# Patient Record
Sex: FEMALE | Race: WHITE | NOT HISPANIC OR LATINO | URBAN - METROPOLITAN AREA
[De-identification: names, ages, dates, MRNs, and addresses within clinical notes are randomized per-mention and may not be internally consistent; named-entity substitution may affect disease eponyms.]

---

## 2022-03-13 ENCOUNTER — INPATIENT (INPATIENT)
Facility: HOSPITAL | Age: 87
LOS: 1 days | Discharge: NURSING, REHAB OR LONG-TERM CARE | End: 2022-03-15
Attending: INTERNAL MEDICINE | Admitting: INTERNAL MEDICINE
Payer: MEDICARE

## 2022-03-13 VITALS
RESPIRATION RATE: 18 BRPM | HEART RATE: 88 BPM | TEMPERATURE: 97 F | OXYGEN SATURATION: 95 % | SYSTOLIC BLOOD PRESSURE: 100 MMHG | DIASTOLIC BLOOD PRESSURE: 50 MMHG

## 2022-03-13 DIAGNOSIS — Z98.890 OTHER SPECIFIED POSTPROCEDURAL STATES: Chronic | ICD-10-CM

## 2022-03-13 LAB
ALBUMIN SERPL ELPH-MCNC: 3.5 G/DL — SIGNIFICANT CHANGE UP (ref 3.5–5.2)
ALP SERPL-CCNC: 70 U/L — SIGNIFICANT CHANGE UP (ref 30–115)
ALT FLD-CCNC: 10 U/L — SIGNIFICANT CHANGE UP (ref 0–41)
ANION GAP SERPL CALC-SCNC: 16 MMOL/L — HIGH (ref 7–14)
APTT BLD: 19.6 SEC — CRITICAL LOW (ref 27–39.2)
AST SERPL-CCNC: 23 U/L — SIGNIFICANT CHANGE UP (ref 0–41)
BASOPHILS # BLD AUTO: 0.03 K/UL — SIGNIFICANT CHANGE UP (ref 0–0.2)
BASOPHILS NFR BLD AUTO: 0.4 % — SIGNIFICANT CHANGE UP (ref 0–1)
BILIRUB SERPL-MCNC: 0.6 MG/DL — SIGNIFICANT CHANGE UP (ref 0.2–1.2)
BUN SERPL-MCNC: 55 MG/DL — HIGH (ref 10–20)
CALCIUM SERPL-MCNC: 9.3 MG/DL — SIGNIFICANT CHANGE UP (ref 8.5–10.1)
CHLORIDE SERPL-SCNC: 92 MMOL/L — LOW (ref 98–110)
CO2 SERPL-SCNC: 31 MMOL/L — SIGNIFICANT CHANGE UP (ref 17–32)
CREAT SERPL-MCNC: 1.6 MG/DL — HIGH (ref 0.7–1.5)
EGFR: 30 ML/MIN/1.73M2 — LOW
EOSINOPHIL # BLD AUTO: 0.04 K/UL — SIGNIFICANT CHANGE UP (ref 0–0.7)
EOSINOPHIL NFR BLD AUTO: 0.5 % — SIGNIFICANT CHANGE UP (ref 0–8)
ETHANOL SERPL-MCNC: <10 MG/DL — SIGNIFICANT CHANGE UP
GLUCOSE SERPL-MCNC: 163 MG/DL — HIGH (ref 70–99)
HCT VFR BLD CALC: 36.8 % — LOW (ref 37–47)
HGB BLD-MCNC: 11.7 G/DL — LOW (ref 12–16)
IMM GRANULOCYTES NFR BLD AUTO: 0.6 % — HIGH (ref 0.1–0.3)
INR BLD: 1.04 RATIO — SIGNIFICANT CHANGE UP (ref 0.65–1.3)
LACTATE SERPL-SCNC: 2.5 MMOL/L — HIGH (ref 0.7–2)
LIDOCAIN IGE QN: 15 U/L — SIGNIFICANT CHANGE UP (ref 7–60)
LYMPHOCYTES # BLD AUTO: 0.92 K/UL — LOW (ref 1.2–3.4)
LYMPHOCYTES # BLD AUTO: 11.4 % — LOW (ref 20.5–51.1)
MCHC RBC-ENTMCNC: 29.3 PG — SIGNIFICANT CHANGE UP (ref 27–31)
MCHC RBC-ENTMCNC: 31.8 G/DL — LOW (ref 32–37)
MCV RBC AUTO: 92.2 FL — SIGNIFICANT CHANGE UP (ref 81–99)
MONOCYTES # BLD AUTO: 0.66 K/UL — HIGH (ref 0.1–0.6)
MONOCYTES NFR BLD AUTO: 8.2 % — SIGNIFICANT CHANGE UP (ref 1.7–9.3)
NEUTROPHILS # BLD AUTO: 6.35 K/UL — SIGNIFICANT CHANGE UP (ref 1.4–6.5)
NEUTROPHILS NFR BLD AUTO: 78.9 % — HIGH (ref 42.2–75.2)
NRBC # BLD: 0 /100 WBCS — SIGNIFICANT CHANGE UP (ref 0–0)
PLATELET # BLD AUTO: 482 K/UL — HIGH (ref 130–400)
POTASSIUM SERPL-MCNC: 3.7 MMOL/L — SIGNIFICANT CHANGE UP (ref 3.5–5)
POTASSIUM SERPL-SCNC: 3.7 MMOL/L — SIGNIFICANT CHANGE UP (ref 3.5–5)
PROT SERPL-MCNC: 6.8 G/DL — SIGNIFICANT CHANGE UP (ref 6–8)
PROTHROM AB SERPL-ACNC: 12 SEC — SIGNIFICANT CHANGE UP (ref 9.95–12.87)
RBC # BLD: 3.99 M/UL — LOW (ref 4.2–5.4)
RBC # FLD: 15.6 % — HIGH (ref 11.5–14.5)
SARS-COV-2 RNA SPEC QL NAA+PROBE: SIGNIFICANT CHANGE UP
SODIUM SERPL-SCNC: 139 MMOL/L — SIGNIFICANT CHANGE UP (ref 135–146)
TROPONIN T SERPL-MCNC: 0.06 NG/ML — CRITICAL HIGH
TROPONIN T SERPL-MCNC: 0.09 NG/ML — CRITICAL HIGH
WBC # BLD: 8.05 K/UL — SIGNIFICANT CHANGE UP (ref 4.8–10.8)
WBC # FLD AUTO: 8.05 K/UL — SIGNIFICANT CHANGE UP (ref 4.8–10.8)

## 2022-03-13 PROCEDURE — 93308 TTE F-UP OR LMTD: CPT | Mod: 26,GC

## 2022-03-13 PROCEDURE — 73070 X-RAY EXAM OF ELBOW: CPT | Mod: 26,RT

## 2022-03-13 PROCEDURE — 76604 US EXAM CHEST: CPT | Mod: 26,GC

## 2022-03-13 PROCEDURE — 73552 X-RAY EXAM OF FEMUR 2/>: CPT | Mod: 26,RT

## 2022-03-13 PROCEDURE — 99221 1ST HOSP IP/OBS SF/LOW 40: CPT

## 2022-03-13 PROCEDURE — 93010 ELECTROCARDIOGRAM REPORT: CPT

## 2022-03-13 PROCEDURE — 99223 1ST HOSP IP/OBS HIGH 75: CPT

## 2022-03-13 PROCEDURE — 72170 X-RAY EXAM OF PELVIS: CPT | Mod: 26

## 2022-03-13 PROCEDURE — 71260 CT THORAX DX C+: CPT | Mod: 26,MA

## 2022-03-13 PROCEDURE — 70450 CT HEAD/BRAIN W/O DYE: CPT | Mod: 26,MA

## 2022-03-13 PROCEDURE — 76705 ECHO EXAM OF ABDOMEN: CPT | Mod: 26,GC

## 2022-03-13 PROCEDURE — 71045 X-RAY EXAM CHEST 1 VIEW: CPT | Mod: 26

## 2022-03-13 PROCEDURE — 72125 CT NECK SPINE W/O DYE: CPT | Mod: 26,MA

## 2022-03-13 PROCEDURE — 99285 EMERGENCY DEPT VISIT HI MDM: CPT | Mod: 25,GC

## 2022-03-13 PROCEDURE — 74177 CT ABD & PELVIS W/CONTRAST: CPT | Mod: 26,MA

## 2022-03-13 RX ORDER — METOPROLOL TARTRATE 50 MG
25 TABLET ORAL DAILY
Refills: 0 | Status: DISCONTINUED | OUTPATIENT
Start: 2022-03-13 | End: 2022-03-15

## 2022-03-13 RX ORDER — VALSARTAN 80 MG/1
160 TABLET ORAL DAILY
Refills: 0 | Status: DISCONTINUED | OUTPATIENT
Start: 2022-03-13 | End: 2022-03-15

## 2022-03-13 RX ORDER — OXYCODONE HYDROCHLORIDE 5 MG/1
5 TABLET ORAL EVERY 6 HOURS
Refills: 0 | Status: DISCONTINUED | OUTPATIENT
Start: 2022-03-13 | End: 2022-03-15

## 2022-03-13 RX ORDER — ACETAMINOPHEN 500 MG
975 TABLET ORAL ONCE
Refills: 0 | Status: COMPLETED | OUTPATIENT
Start: 2022-03-13 | End: 2022-03-13

## 2022-03-13 RX ORDER — SODIUM CHLORIDE 9 MG/ML
500 INJECTION, SOLUTION INTRAVENOUS ONCE
Refills: 0 | Status: COMPLETED | OUTPATIENT
Start: 2022-03-13 | End: 2022-03-13

## 2022-03-13 RX ORDER — HYDROCHLOROTHIAZIDE 25 MG
25 TABLET ORAL DAILY
Refills: 0 | Status: DISCONTINUED | OUTPATIENT
Start: 2022-03-13 | End: 2022-03-15

## 2022-03-13 RX ORDER — ENOXAPARIN SODIUM 100 MG/ML
40 INJECTION SUBCUTANEOUS EVERY 24 HOURS
Refills: 0 | Status: DISCONTINUED | OUTPATIENT
Start: 2022-03-13 | End: 2022-03-15

## 2022-03-13 RX ORDER — SENNA PLUS 8.6 MG/1
2 TABLET ORAL AT BEDTIME
Refills: 0 | Status: DISCONTINUED | OUTPATIENT
Start: 2022-03-13 | End: 2022-03-15

## 2022-03-13 RX ADMIN — Medication 975 MILLIGRAM(S): at 14:37

## 2022-03-13 RX ADMIN — SODIUM CHLORIDE 500 MILLILITER(S): 9 INJECTION, SOLUTION INTRAVENOUS at 18:01

## 2022-03-13 RX ADMIN — Medication 975 MILLIGRAM(S): at 14:07

## 2022-03-13 RX ADMIN — SENNA PLUS 2 TABLET(S): 8.6 TABLET ORAL at 21:30

## 2022-03-13 RX ADMIN — ENOXAPARIN SODIUM 40 MILLIGRAM(S): 100 INJECTION SUBCUTANEOUS at 21:29

## 2022-03-13 NOTE — H&P ADULT - NSHPPHYSICALEXAM_GEN_ALL_CORE
VITALS:   T(C): 36.1 (03-13-22 @ 12:20), Max: 36.1 (03-13-22 @ 12:20)  HR: 93 (03-13-22 @ 12:45) (88 - 103)  BP: 135/61 (03-13-22 @ 12:45) (100/50 - 136/88)  RR: 19 (03-13-22 @ 12:30) (18 - 19)  SpO2: 96% (03-13-22 @ 12:30) (95% - 96%)    GENERAL: NAD, lying in bed comfortably  HEAD:  Atraumatic, normocephalic  EYES: EOMI, PERRLA, conjunctiva and sclera clear  ENT: Moist mucous membranes  NECK: Supple, no JVD  HEART: Regular rate and rhythm, no murmurs, rubs, or gallops  LUNGS: Unlabored respirations.  Clear to auscultation bilaterally, no crackles, wheezing, or rhonchi  ABDOMEN: Soft, nontender, nondistended, +BS  EXTREMITIES: 2+ peripheral pulses bilaterally. No clubbing, cyanosis, or edema. right hip tenderness  NERVOUS SYSTEM:  A&Ox3, no focal deficits   SKIN: No rashes or lesions

## 2022-03-13 NOTE — CONSULT NOTE ADULT - SUBJECTIVE AND OBJECTIVE BOX
Pt Name: LOREE CELAYA  MRN: [id number]      HPI: 89F 2-3 s/p R. hip hemiarthroplasty for R. hip fracture presents after unwitnessed fall at [facility]. States she has right hip pain, which is of the same quality and quantity of her pain prior to her fall. States she has ambulated since her fall. Denies numbness/tingling.     PAST MEDICAL & SURGICAL HISTORY:  HTN (hypertension)    Femur neck fracture  right    History of hip surgery  Right 2022        Allergies: No Known Allergies      Medications: enoxaparin Injectable 40 milliGRAM(s) SubCutaneous every 24 hours  hydrochlorothiazide 25 milliGRAM(s) Oral daily  metoprolol succinate ER 25 milliGRAM(s) Oral daily  oxyCODONE    IR 5 milliGRAM(s) Oral every 6 hours PRN  senna 2 Tablet(s) Oral at bedtime  valsartan 160 milliGRAM(s) Oral daily        PHYSICAL EXAM:    Vital Signs Last 24 Hrs  T(C): 36.1 (13 Mar 2022 12:20), Max: 36.1 (13 Mar 2022 12:20)  T(F): 97 (13 Mar 2022 12:20), Max: 97 (13 Mar 2022 12:20)  HR: 93 (13 Mar 2022 12:45) (88 - 103)  BP: 135/61 (13 Mar 2022 12:45) (100/50 - 136/88)  BP(mean): 88 (13 Mar 2022 12:45) (88 - 105)  RR: 19 (13 Mar 2022 12:30) (18 - 19)  SpO2: 96% (13 Mar 2022 12:30) (95% - 96%)    Physical Exam:  General: NAD, Alert, Awake and oriented  Neurologic: No gross deficits, moving all 4s.  Head: NCAT without abrasions, lacerations, or ecchymosis to head, face, or scalp  Respiratory: Unlabored breathing   Abdomen: Soft, Nontender, no guarding.  Musculoskeletal:        RLE:  Healed posterolateral incision   Mildly TTP at the Right hip   Able to SLR albeit with pain   Mild pain with log-roll or axial compression  Able to actively SLR.  SILT DP/SP/T/Mary/Sa.   EHL/FHL/TA/Gs motor intact.  2+ DP/PT pulses with brisk cap refill distally.  Compartments soft and compressible.   No pain on passive stretch.                 11.7   8.05  )-----------( 482      ( 13 Mar 2022 12:35 )             36.8     03-13    139  |  92<L>  |  55<H>  ----------------------------<  163<H>  3.7   |  31  |  1.6<H>    Ca    9.3      13 Mar 2022 12:35    TPro  6.8  /  Alb  3.5  /  TBili  0.6  /  DBili  x   /  AST  23  /  ALT  10  /  AlkPhos  70  03-13    PT/INR - ( 13 Mar 2022 12:35 )   PT: 12.00 sec;   INR: 1.04 ratio         PTT - ( 13 Mar 2022 12:35 )  PTT:19.6 sec    Imaging: No obvious fracture or dislocation. Cemented right hip hemiarthroplasty in adequate position.     A/P:    89y Female sp cemented r. hip hemiarthroplasty 2-3 weeks ago by Dr. Germain at [facility] presents for mechanical fall. Able to bear weight on her RLE with mild pain which she states is unchanged since her surgery. Her fluid collection seen on CT represents normative post-surgical changes.     - No orthopaedic surgery intervention  - WBAT RLE   - Patient can follow-up with her operating surgeon

## 2022-03-13 NOTE — ED PROVIDER NOTE - PROGRESS NOTE DETAILS
RK orthopaedic surgery consulted given recent hip surgery 2 weeks ago, ortho evaluated patient at bedside. RK per trauma recommendation, neurosurgery consulted, they will come assess pt. RK: patient cleared per trauma and neurosurgery services. patient to be admitted under medicine for elevated troponin and DIMAS. Patient came in as a trauma I intend to get an E-FAST

## 2022-03-13 NOTE — ED PROVIDER NOTE - OBJECTIVE STATEMENT
89F w/ pmhx of htn bibems from rehab facility [facility] for unwitnessed fall. pt is hard of hearing and poor historian, AAOX3. she does not remember falling. she was [place], [place] 1 [place]. unknown loc or ht. she does take PPX lovenox since she is in rehab facility. She broke her right hip after fall 2 weeks ago. denies pain anywhere except for right hip where her surgery was done.

## 2022-03-13 NOTE — ED PROVIDER NOTE - CARE PLAN
Principal Discharge DX:	Fall  Secondary Diagnosis:	Elevated troponin level  Secondary Diagnosis:	DIMAS (acute kidney injury)   1

## 2022-03-13 NOTE — H&P ADULT - ASSESSMENT
89yF w/ PMHx of HTN, HLD s/p Right hip replacement BIBEMS s/p unwitnessed fall at [facility], +ht, -loc, +lovenox (prophylactic, post surgical).  The patient states she fell at the nursing home on her right side, does not remember the events leading up to the fall or how she fell, but fell on the right sided that she was recently operated on by orthopedics. The patient is AOX3 at the time of my exam, GCS 15, and hemodynamically stable.     # Unwitnessed fall  # Recurrent falls  # Recent R hip fracture s/p surgery 2 weeks ago  - Trauma work-up negative  - Unkown LOC  - No sensorimotor deficits  - AOx3 at the time of my exam, but patient is a poor historian  - Cleared by trauma and neurosurgery  - Ortho consult  - Since patient is on lovenox, trend Hb  - C/w oxy 5mg q6 PRN for pain  - EEG  - TTE  - PT eval    # DIMAS vs CKD  - no baseline Cr  - Trend BMP    # Troponemia  - most likely type II  - Trend Trop  - 2nd and 3ed set trop ordered for 8 and 11:30pm     # HTN  - C/w HCTZ, valsartan, and toprol    Diet: DASH  Activity: IAT  DVT Prophylaxis: lovenox  GI Prophylaxis: not indicated  CHG Order  Code Status: full  Disposition: from [facility]

## 2022-03-13 NOTE — ED PROVIDER NOTE - ATTENDING CONTRIBUTION TO CARE
89-year-old female past medical history as documented presenting status post unwitnessed fall from nursing home.  Patient is very limited historian denies memory of the event.  Unsure if she hit her head or had LOC.  Currently endorsing isolated right hip pain described as achy, nonradiating, no palliative or provocative factors, mild severity.  Denies other injuries or complaints.    Vital Signs: I have reviewed the initial vital signs.  Constitutional: NAD, well-nourished, appears stated age, no acute distress.  HEENT: Airway patent, moist MM, no erythema/swelling/deformity of oral structures. EOMI, PERRLA.  CV: regular rate, regular rhythm, well-perfused extremities, 2+ b/l DP and radial pulses equal.  Lungs: BCTA, no increased WOB.  ABD: NTND, no guarding or rebound, no pulsatile mass, no hernias.   MSK: Neck supple, nontender, nl ROM, no stepoff. Chest nontender. Back nontender in TLS spine or to b/l bony structures or flanks. (+) R hip tenderness but otherwise ext nontender, nl rom, no deformity.   INTEG: Skin warm, dry, no rash.  NEURO: A&Ox3, normal strength, nl sensation throughout, normal speech.   PSYCH: Calm, cooperative, normal affect and interaction.    Trauma alert called from triage as patient on anticoagulant.  Will pan scan per trauma recs, obtain labs, reeval.

## 2022-03-13 NOTE — ED PROVIDER NOTE - NS ED ROS FT
Constitutional: No fever   Eyes:  No visual changes  Ears:  No hearing changes  Neck: No neck pain  Cardiac:  No chest pain  Respiratory:  No SOB   GI:  No abdominal pain, nausea, or vomiting  :  No dysuria  MS:  No back pain +right hip pain  Neuro:  No headache or weakness.  No LOC  Skin:  No skin rash

## 2022-03-13 NOTE — PATIENT PROFILE ADULT - FALL HARM RISK - HARM RISK INTERVENTIONS

## 2022-03-13 NOTE — ED PROVIDER NOTE - CLINICAL SUMMARY MEDICAL DECISION MAKING FREE TEXT BOX
Patient presented status post unwitnessed fall from nursing home.  Otherwise on arrival patient afebrile, hemodynamically stable, neurovascularly intact.  Trauma alert called this patient is on anticoagulation.  Pan scan for trauma Jose Enrique revealed age-indeterminate fractures of L5, as well as T4-T10 and right hip fracture.  Consulted neurosurgery who evaluated patient in ED and did not recommend any emergent intervention at this time.  Ortho also evaluated patient in ED and will follow.  Patient however with slight DIMAS as well as elevated troponin and blood work and with difficulty ambulating since the fall.  Per trauma, can admit to medicine for further management.  Patient is agreeable with plan.  Hemodynamically stable at time of admission.

## 2022-03-13 NOTE — ED PROCEDURE NOTE - ATTENDING CONTRIBUTION TO CARE
I personally supervised the study.  I reviewed the images and interpretation by the resident and have edited where appropriate.
I personally supervised the study.  I reviewed the images and interpretation by the resident and have edited where appropriate.

## 2022-03-13 NOTE — ED ADULT NURSE NOTE - OBJECTIVE STATEMENT
Pt. s/p unwitnessed fall at [facility], found on ground by staff. Pt. states she hit head, c/o pain to right hip (recently had surgery on hip for fx) and right elbow pain (skin tear noted). Pt. at baseline mental status per daughter. Pt. takes lovenox.

## 2022-03-13 NOTE — CONSULT NOTE ADULT - SUBJECTIVE AND OBJECTIVE BOX
HPI:   This is a 89F w/ pmhx of htn bibems from rehab facility [facility] for unwitnessed fall. pt is hard of hearing and poor historian, AAOX3. she does not remember falling. she was [place], [place] 1 [place]. unknown loc or ht. she does take PPX lovenox since she is in rehab facility. She broke her right hip after fall 2 weeks ago. denies pain anywhere except for right hip where her surgery was done.    Pt seen and examined at bedside in ER hallway.  Called for findings of < from: CT Abdomen and Pelvis w/ IV Cont (03.13.22 @ 13:51) >  IMPRESSION:    Age-indeterminate moderate compression deformities at L5, T10, T9, T8,   T6, T5, and T4. Age-indeterminate mild compression deformities at T11, T3   and T11.    Status post multilevel kyphoplasty at T12 through L4.    Partially imaged somewhat serpiginous fluid collection with a   well-defined thin wall is seen within the musculature adjacent to the   right hip which is status post hemiarthroplasty. Streak artifact limits   evaluation of this collection, which may be post surgical or perhaps   represent a distended greater trochanter bursa. Superimposed infection   cannot be ruled out. Clinical correlation with timeline of surgery and   for infection are recommended.    Punctate nodularity within an emphysematous bleb at the right apex   ((6-43). Consider short-term 3 month follow-up CT to evaluate for   stability.    Suggestion of bladder wall thickening which is out of portion to the   degree of underdistention. Correlation with urinalysis is recommended.    < end of copied text >    Pt c/o of pain to RLE adjacent to where she had her previous hemiarthroplasty. Pt states she fell but doesn't remember when.  She is a poor historian for details regarding fall.  Pt denies any neck or back pain.  Denies urinary/bowel incontinence.      PAST MEDICAL & SURGICAL HISTORY:  HTN (hypertension)    Femur neck fracture  right    History of hip surgery  Right 2022        Imaging: < from: CT Head No Cont (03.13.22 @ 13:22) >    IMPRESSION:  CT head: No evidence of acute transcortical infarct, acute intracranial   hemorrhage or mass effect.    CT cervical spine: No acute fracture or traumatic subluxation.    < end of copied text >      Home Medications:  Colace 100 mg oral capsule: 1 cap(s) orally 2 times a day (13 Mar 2022 12:56)  Lovenox 40 mg/0.4 mL injectable solution: 40 milligram(s) injectable once a day (13 Mar 2022 12:56)  Metoprolol Succinate ER 25 mg oral tablet, extended release: 1 tab(s) orally once a day (13 Mar 2022 12:56)  oxyCODONE 5 mg oral tablet: 1 tab(s) orally every 6 hours, As Needed - for moderate pain (13 Mar 2022 12:56)  valsartan-hydrochlorothiazide 160 mg-25 mg oral tablet: 1 tab(s) orally once a day (13 Mar 2022 12:56)      Allergies    No Known Allergies    Intolerances        ROS:  [ x ] A ten-point review of systems is negative except as noted   [  ] Due to altered mental status/intubation, subjective information were not able to be obtained from the patient. History was obtained, to the extent possible, from review of the chart and collateral sources of information    MEDICATIONS  (STANDING):    MEDICATIONS  (PRN):      ICU Vital Signs Last 24 Hrs  T(C): 36.1 (13 Mar 2022 12:20), Max: 36.1 (13 Mar 2022 12:20)  T(F): 97 (13 Mar 2022 12:20), Max: 97 (13 Mar 2022 12:20)  HR: 93 (13 Mar 2022 12:45) (88 - 103)  BP: 135/61 (13 Mar 2022 12:45) (100/50 - 136/88)  BP(mean): 88 (13 Mar 2022 12:45) (88 - 105)  ABP: --  ABP(mean): --  RR: 19 (13 Mar 2022 12:30) (18 - 19)  SpO2: 96% (13 Mar 2022 12:30) (95% - 96%)      I&O's Detail      CBC Full  -  ( 13 Mar 2022 12:35 )  WBC Count : 8.05 K/uL  RBC Count : 3.99 M/uL  Hemoglobin : 11.7 g/dL  Hematocrit : 36.8 %  Platelet Count - Automated : 482 K/uL  Mean Cell Volume : 92.2 fL  Mean Cell Hemoglobin : 29.3 pg  Mean Cell Hemoglobin Concentration : 31.8 g/dL  Auto Neutrophil # : 6.35 K/uL  Auto Lymphocyte # : 0.92 K/uL  Auto Monocyte # : 0.66 K/uL  Auto Eosinophil # : 0.04 K/uL  Auto Basophil # : 0.03 K/uL  Auto Neutrophil % : 78.9 %  Auto Lymphocyte % : 11.4 %  Auto Monocyte % : 8.2 %  Auto Eosinophil % : 0.5 %  Auto Basophil % : 0.4 %    03-13    139  |  92<L>  |  55<H>  ----------------------------<  163<H>  3.7   |  31  |  1.6<H>    Ca    9.3      13 Mar 2022 12:35    TPro  6.8  /  Alb  3.5  /  TBili  0.6  /  DBili  x   /  AST  23  /  ALT  10  /  AlkPhos  70  03-13    CARDIAC MARKERS ( 13 Mar 2022 12:35 )  x     / 0.09 ng/mL / x     / x     / x              Awake, alert.  Pt is hard of hearing  Verbal function intact  facial motions symmetric   PERRL  Motor: MAEx4  5/5 power in b/l UE  No Hoffmans   5/5 power in LLE  5-/5 power in RLE 2/2 pain  Sensation: intact to LT  No TTP             Assessment/Plan:  No neurosurgical intervention  Patient without pain  No neurologic deficits  Physical therapy once cleared by Trauma  discussed with attending

## 2022-03-13 NOTE — H&P ADULT - ATTENDING COMMENTS
IN PROGRESs    89 yr old female from [facility] with hx of HTN, HLD, recent Right hip replacement (2 weeks ago) admitted for unwitnessed fall.    # Unwitnessed fall  # Recent R hip fracture s/p surgery 2 weeks ago  - cleared by trauma and orthopedics   - pain control   - check orthostatics  - check UA  - PT eval     # DIMAS vs CKD 3  - Cr 1.6 (unknown baseline), BUN 55  - start NS@65  - Trend BMP  - hold HCTZ, valsartan for now     # Elevated Trop   - most likely type II  - Trop 0.09 --> 0.06  - EKG: NSR with no acute ST changes     # HTN  - c/w toprol  - hold HCTZ, valsartan for now   - if bp elevated start amlodipine     # DVT ppx  - c/w heparin     # Full code IN PROGRESS    89 yr old female from [facility] with hx of HTN, HLD, recent Right hip replacement (2 weeks ago) admitted for unwitnessed fall.    # Unwitnessed fall  # Recent R hip fracture s/p surgery 2 weeks ago  - cleared by trauma and orthopedics   - pain control   - check orthostatics  - check UA  - PT eval   - tele monitoring for 24 hrs    # DIMAS vs CKD 3  - Cr 1.6 (unknown baseline), BUN 55  - start NS@65  - Trend BMP  - hold HCTZ, valsartan for now     # Elevated Trop   - most likely type II  - Trop 0.09 --> 0.06  - EKG: NSR with no acute ST changes     # HTN  - c/w toprol  - hold HCTZ, valsartan for now   - if bp elevated start amlodipine     # DVT ppx  - c/w heparin     # Full code      ** Pt seen on 03/13/22 Patient is a poor historian.     89 yr old female from [facility] with hx of Dementia, HTN, HLD, recent Right hip replacement (2 weeks ago) admitted for unwitnessed fall.     # Unwitnessed fall  # Recent R hip fracture s/p surgery 2 weeks ago  - cleared by trauma and orthopedics   - right ankle swollen and tender  - check XR right ankle   - pain control   - check orthostatics  - check UA  - PT eval   - tele monitoring for 24 hrs    # DIMAS vs CKD 3  - Cr 1.6 (unknown baseline), BUN 55  - start NS@65  - Trend BMP  - hold HCTZ, valsartan for now     # Elevated Trop   - most likely type II  - Trop 0.09 --> 0.06  - EKG: NSR with no acute ST changes     # HTN  - c/w toprol  - hold HCTZ, valsartan for now   - if bp elevated start amlodipine     # DVT ppx  - c/w heparin     # Full code      ** Pt seen on 03/13/22

## 2022-03-13 NOTE — CONSULT NOTE ADULT - ATTENDING COMMENTS
agree with above  no acute ortho intervention  can fu with surgeon who did the procedure at [facility] for further care

## 2022-03-13 NOTE — ED PROVIDER NOTE - PHYSICAL EXAMINATION
CONSTITUTIONAL: well-developed, well-nourished, in no acute distress  SKIN: warm, dry  HEAD: Normocephalic atraumatic  EYES: no conjunctival erythema  ENT: no nasal discharge, airway clear  NECK: full ROM, non-tender  CARD: regular rate and rhythm  RESP: normal respiratory effort, no wheezes, rales or rhonchi  ABD: soft, non-distended, non-tender  BACK: no midline tenderness  EXT: moving all extremities spontaneously, right   NEURO: alert and oriented, grossly unremarkable  PSYCH: cooperative, appropriate Prednisone Counseling:  I discussed with the patient the risks of prolonged use of prednisone including but not limited to weight gain, insomnia, osteoporosis, mood changes, diabetes, susceptibility to infection, glaucoma and high blood pressure.  In cases where prednisone use is prolonged, patients should be monitored with blood pressure checks, serum glucose levels and an eye exam.  Additionally, the patient may need to be placed on GI prophylaxis, PCP prophylaxis, and calcium and vitamin D supplementation and/or a bisphosphonate.  The patient verbalized understanding of the proper use and the possible adverse effects of prednisone.  All of the patient's questions and concerns were addressed.

## 2022-03-13 NOTE — CONSULT NOTE ADULT - ATTENDING COMMENTS
patient seen, agree with abvoe. full trauma workup was performed including ct scan of head, c spine, chest abdomen and pelvis.

## 2022-03-13 NOTE — H&P ADULT - NSHPLABSRESULTS_GEN_ALL_CORE
LABS:  cret                        11.7   8.05  )-----------( 482      ( 13 Mar 2022 12:35 )             36.8     03-13    139  |  92<L>  |  55<H>  ----------------------------<  163<H>  3.7   |  31  |  1.6<H>    Ca    9.3      13 Mar 2022 12:35    TPro  6.8  /  Alb  3.5  /  TBili  0.6  /  DBili  x   /  AST  23  /  ALT  10  /  AlkPhos  70  03-13    PT/INR - ( 13 Mar 2022 12:35 )   PT: 12.00 sec;   INR: 1.04 ratio         PTT - ( 13 Mar 2022 12:35 )  PTT:19.6 sec

## 2022-03-13 NOTE — H&P ADULT - NSHPREVIEWOFSYSTEMS_GEN_ALL_CORE
REVIEW OF SYSTEMS:  presenting for fall  CONSTITUTIONAL: No weakness, fevers or chills  EYES/ENT: No visual changes;  No vertigo or throat pain   NECK: No pain or stiffness  RESPIRATORY: No cough, wheezing, hemoptysis; No shortness of breath  CARDIOVASCULAR: No chest pain or palpitations  GASTROINTESTINAL: No abdominal or epigastric pain. No nausea, vomiting, or hematemesis; No diarrhea or constipation. No melena or hematochezia.  GENITOURINARY: No dysuria, frequency or hematuria  NEUROLOGICAL: No numbness or weakness  SKIN: No itching, rashes

## 2022-03-13 NOTE — CONSULT NOTE ADULT - ASSESSMENT
ASSESSMENT:  89yF w/ PMHx of HTN, HLD s/p Right ?hip replacement seen as Trauma Alert  s/p unwitnessed fall at [facility], +ht, -loc, +lovenox (prophylactic, post surgical). External signs of trauma include right elbow skin tear. Patient complains of right hip pain after recent right hip surgery (?exact procedure unknown), right hip incision clean dry and intact, no right hip deformities.   ABCs intact, A+Ox2, GCS 15    Injuries identified: PENDING  -   -   -     PLAN:   - Trauma Labs: (CBC, BMP, Coags, T&S, UA, EtOH level)  Additional studies:  EKG  Utox    Trauma Imaging to include the following:  - CXR, Pelvic Xray  - CT Head,  CT C-spine, CT Chest, CT Abd/Pelvis  - Extremity films: Right elbow xrays, right femur xrays      Disposition pending results of above labs and imaging  Above plan discussed with Trauma attending, Dr. Dunn, Dr. Colorado, PGY-2  , patient, patient family, and ED team  --------------------------------------------------------------------------------------  03-13-22 @ 13:00 ASSESSMENT:  89yF w/ PMHx of HTN, HLD s/p Right ?hip replacement seen as Trauma Alert  s/p unwitnessed fall at [facility], +ht, -loc, +lovenox (prophylactic, post surgical). External signs of trauma include right elbow skin tear. Patient complains of right hip pain after recent right hip surgery (?exact procedure unknown), right hip incision clean dry and intact, no right hip deformities.   ABCs intact, A+Ox2, GCS 15    Injuries identified:  - No acute traumatic injuries  - Age-indeterminate compression defomities at L5,T10, T9, T8,T6,T5,T4- , mild compression deformities at T11, and T3, patient is non-tender on examination      PLAN:   -NSX consult  -Awaiting neurosurgery consult, will clear from trauma perspective after neurosurgery consult      Disposition pending results of above labs and imaging  Above plan discussed with Trauma attending, Dr. Dunn, Dr. Colorado, PGY-2  , patient, patient family, and ED team  --------------------------------------------------------------------------------------  03-13-22 @ 13:00 ASSESSMENT:  89yF w/ PMHx of HTN, HLD s/p Right ?hip replacement seen as Trauma Alert  s/p unwitnessed fall at [facility], +ht, -loc, +lovenox (prophylactic, post surgical). External signs of trauma include right elbow skin tear. Patient complains of right hip pain after recent right hip surgery (?exact procedure unknown), right hip incision clean dry and intact, no right hip deformities.   ABCs intact, A+Ox2, GCS 15    Injuries identified:  - No acute traumatic injuries  - Age-indeterminate compression defomities at L5,T10, T9, T8,T6,T5,T4- , mild compression deformities at T11, and T3, patient is non-tender on examination      PLAN:   -NSX consult: no acute intervention  -recommend orthopedic surgery consult  - no acute traumatic injuries. trauma tertiary survey 3/14      Disposition pending results of above labs and imaging  Above plan discussed with Trauma attending, Dr. Dunn, Dr. Colorado, PGY-2  , patient, patient family, and ED team  --------------------------------------------------------------------------------------  03-13-22 @ 13:00

## 2022-03-13 NOTE — CONSULT NOTE ADULT - SUBJECTIVE AND OBJECTIVE BOX
TRAUMA ACTIVATION LEVEL:  CODE / ALERT  / CONSULT  ACTIVATED BY: EMS**  /  ED**  INTUBATED: YES** / NO**      MECHANISM OF INJURY:   [x] Blunt     [x] Fall	      GCS: 15 	E: 4	V: 5	M: 6    HPI:    89yF w/ PMHx of HTN, HLD s/p Right ?hip replacement seen as Trauma Alert  s/p unwitnessed fall at [facility], +ht, -loc, +lovenox (prophylactic, post surgical).  The patient states she fell at the nursing home on her right side, does not remember the events leading up to the fall or how she fell, but fell on the right sided that she was recently operated on by orthopedics. The patient is A+OX2, GCS 15 in trauma bay, vital signs are stable. The patient complains of right hip pain and right elbow pain. External signs of trauma include right elbow skin tear and tenderness over the right hip incision site - no noted deformities. The patient is accompanied by her daughter who was not present during the fall.         Trauma assessment in ED: ABCs intact , GCS 15 , AAOx3.    PAST MEDICAL & SURGICAL HISTORY:  HTN (hypertension)    Femur neck fracture  right    History of hip surgery  Right 2022        Allergies    No Known Allergies    Intolerances        Home Medications:  Colace 100 mg oral capsule: 1 cap(s) orally 2 times a day (13 Mar 2022 12:56)  Lovenox 40 mg/0.4 mL injectable solution: 40 milligram(s) injectable once a day (13 Mar 2022 12:56)  Metoprolol Succinate ER 25 mg oral tablet, extended release: 1 tab(s) orally once a day (13 Mar 2022 12:56)  oxyCODONE 5 mg oral tablet: 1 tab(s) orally every 6 hours, As Needed - for moderate pain (13 Mar 2022 12:56)  valsartan-hydrochlorothiazide 160 mg-25 mg oral tablet: 1 tab(s) orally once a day (13 Mar 2022 12:56)      ROS: 10-system review is otherwise negative except HPI above.      Primary Survey:    A - airway intact  B - bilateral breath sounds and good chest rise  C - palpable pulses in all extremities  D - GCS 15 on arrival, SANDERS  Exposure obtained    Vital Signs Last 24 Hrs  T(C): 36.1 (13 Mar 2022 12:20), Max: 36.1 (13 Mar 2022 12:20)  T(F): 97 (13 Mar 2022 12:20), Max: 97 (13 Mar 2022 12:20)  HR: 93 (13 Mar 2022 12:45) (88 - 103)  BP: 135/61 (13 Mar 2022 12:45) (100/50 - 136/88)  BP(mean): 88 (13 Mar 2022 12:45) (88 - 105)  RR: 19 (13 Mar 2022 12:30) (18 - 19)  SpO2: 96% (13 Mar 2022 12:30) (95% - 96%)    Secondary Survey:   General: NAD  HEENT: Normocephalic, atraumatic, EOMI, PEERLA. no scalp lacerations   Neck: Soft, midline trachea. no c-spine tenderness  Chest: No chest wall tenderness, no subcutaneous emphysema   Cardiac: S1, S2, RRR  Respiratory: Bilateral breath sounds, clear and equal bilaterally  Abdomen: Soft, non-distended, non-tender, no rebound, no guarding.  Groin: Normal appearing, pelvis stable   Ext: Right lateral hip incision, incision is clean and dry, no erythema or signs of cellulitis. Unable to elevate right leg. . Bilateral LE 2+pitting edema. Right elbow skin tear Moving b/l upper and left lower extremities  Palpable Radial b/l UE, b/l DP palpable in LE.   Back: No T/L/S spine tenderness, No palpable runoff/stepoff/deformity  Rectal: No jyotsna blood, JENNIFER with good tone    FAST: Negative    ACCESS / DEVICES:  [X ] Peripheral IV    Labs:  PENDING      POCT Blood Glucose.: 156 mg/dL (13 Mar 2022 12:28)                        RADIOLOGY & ADDITIONAL STUDIES:  PENDING  ---------------------------------------------------------------------------------------     TRAUMA ACTIVATION LEVEL:  CODE / ALERT  / CONSULT  ACTIVATED BY: EMS**  /  ED**  INTUBATED: YES** / NO**      MECHANISM OF INJURY:   [x] Blunt     [x] Fall	      GCS: 15 	E: 4	V: 5	M: 6    HPI:    89yF w/ PMHx of HTN, HLD s/p Right ?hip replacement seen as Trauma Alert  s/p unwitnessed fall at [facility], +ht, -loc, +lovenox (prophylactic, post surgical).  The patient states she fell at the nursing home on her right side, does not remember the events leading up to the fall or how she fell, but fell on the right sided that she was recently operated on by orthopedics. The patient is A+OX2, GCS 15 in trauma bay, vital signs are stable. The patient complains of right hip pain and right elbow pain. External signs of trauma include right elbow skin tear and tenderness over the right hip incision site - no noted deformities. The patient is accompanied by her daughter who was not present during the fall.         Trauma assessment in ED: ABCs intact , GCS 15 , AAOx3.    PAST MEDICAL & SURGICAL HISTORY:  HTN (hypertension)    Femur neck fracture  right    History of hip surgery  Right 2022        Allergies    No Known Allergies    Intolerances        Home Medications:  Colace 100 mg oral capsule: 1 cap(s) orally 2 times a day (13 Mar 2022 12:56)  Lovenox 40 mg/0.4 mL injectable solution: 40 milligram(s) injectable once a day (13 Mar 2022 12:56)  Metoprolol Succinate ER 25 mg oral tablet, extended release: 1 tab(s) orally once a day (13 Mar 2022 12:56)  oxyCODONE 5 mg oral tablet: 1 tab(s) orally every 6 hours, As Needed - for moderate pain (13 Mar 2022 12:56)  valsartan-hydrochlorothiazide 160 mg-25 mg oral tablet: 1 tab(s) orally once a day (13 Mar 2022 12:56)      ROS: 10-system review is otherwise negative except HPI above.      Primary Survey:    A - airway intact  B - bilateral breath sounds and good chest rise  C - palpable pulses in all extremities  D - GCS 15 on arrival, SANDERS  Exposure obtained    Vital Signs Last 24 Hrs  T(C): 36.1 (13 Mar 2022 12:20), Max: 36.1 (13 Mar 2022 12:20)  T(F): 97 (13 Mar 2022 12:20), Max: 97 (13 Mar 2022 12:20)  HR: 93 (13 Mar 2022 12:45) (88 - 103)  BP: 135/61 (13 Mar 2022 12:45) (100/50 - 136/88)  BP(mean): 88 (13 Mar 2022 12:45) (88 - 105)  RR: 19 (13 Mar 2022 12:30) (18 - 19)  SpO2: 96% (13 Mar 2022 12:30) (95% - 96%)    Secondary Survey:   General: NAD  HEENT: Normocephalic, atraumatic, EOMI, PEERLA. no scalp lacerations   Neck: Soft, midline trachea. no c-spine tenderness  Chest: No chest wall tenderness, no subcutaneous emphysema   Cardiac: S1, S2, RRR  Respiratory: Bilateral breath sounds, clear and equal bilaterally  Abdomen: Soft, non-distended, non-tender, no rebound, no guarding.  Groin: Normal appearing, pelvis stable   Ext: Right lateral hip incision, incision is clean and dry, no erythema or signs of cellulitis. Unable to elevate right leg. . Bilateral LE 2+pitting edema. Right elbow skin tear Moving b/l upper and left lower extremities  Palpable Radial b/l UE, b/l DP palpable in LE.   Back: No T/L/S spine tenderness, No palpable runoff/stepoff/deformity  Rectal: No jyotsna blood, JENNIFER with good tone    FAST: Negative    ACCESS / DEVICES:  [X ] Peripheral IV    Labs:  Labs:  CAPILLARY BLOOD GLUCOSE      POCT Blood Glucose.: 156 mg/dL (13 Mar 2022 12:28)                          11.7   8.05  )-----------( 482      ( 13 Mar 2022 12:35 )             36.8       Auto Neutrophil %: 78.9 % (03-13-22 @ 12:35)  Auto Immature Granulocyte %: 0.6 % (03-13-22 @ 12:35)    03-13    139  |  92<L>  |  55<H>  ----------------------------<  163<H>  3.7   |  31  |  1.6<H>      Calcium, Total Serum: 9.3 mg/dL (03-13-22 @ 12:35)      LFTs:             6.8  | 0.6  | 23       ------------------[70      ( 13 Mar 2022 12:35 )  3.5  | x    | 10          Lipase:15     Amylase:x         Lactate, Blood: 2.5 mmol/L (03-13-22 @ 12:35)      Coags:     12.00  ----< 1.04    ( 13 Mar 2022 12:35 )     19.6        CARDIAC MARKERS ( 13 Mar 2022 12:35 )  x     / 0.09 ng/mL / x     / x     / x            RADIOLOGY & ADDITIONAL STUDIES:  < from: CT Abdomen and Pelvis w/ IV Cont (03.13.22 @ 13:51) >  IMPRESSION:    Age-indeterminate moderate compression deformities at L5, T10, T9, T8,   T6, T5, and T4. Age-indeterminate mild compression deformities at T11, T3   and T11.    Status post multilevel kyphoplasty at T12 through L4.    Partially imaged somewhat serpiginous fluid collection with a   well-defined thin wall is seen within the musculature adjacent to the   right hip which is status post hemiarthroplasty. Streak artifact limits   evaluation of this collection, which may be post surgical or perhaps   represent a distended greater trochanter bursa. Superimposed infection   cannot be ruled out. Clinical correlation with timeline of surgery and   for infection are recommended.    Punctate nodularity within an emphysematous bleb at the right apex   ((6-43). Consider short-term 3 month follow-up CT to evaluate for   stability.    Suggestion of bladder wall thickening which is out of portion to the   degree of underdistention. Correlation with urinalysis is recommended.    --- End of Report ---    < end of copied text >  < from: CT Chest w/ IV Cont (03.13.22 @ 13:51) >  IMPRESSION:    Age-indeterminate moderate compression deformities at L5, T10, T9, T8,   T6, T5, and T4. Age-indeterminate mild compression deformities at T11, T3   and T11.    Status post multilevel kyphoplasty at T12 through L4.    Partially imaged somewhat serpiginous fluid collection with a   well-defined thin wall is seen within the musculature adjacent to the   right hip which is status post hemiarthroplasty. Streak artifact limits   evaluation of this collection, which may be post surgical or perhaps   represent a distended greater trochanter bursa. Superimposed infection   cannot be ruled out. Clinical correlation with timeline of surgery and   for infection are recommended.    Punctate nodularity within an emphysematous bleb at the right apex   ((6-43). Consider short-term 3 month follow-up CT to evaluate for   stability.    Suggestion of bladder wall thickening which is out of portion to the   degree of underdistention. Correlation with urinalysis is recommended.    --- End of Report ---      < end of copied text >  < from: CT Cervical Spine No Cont (03.13.22 @ 13:23) >  IMPRESSION:  CT head: No evidence of acute transcortical infarct, acute intracranial   hemorrhage or mass effect.    CT cervical spine: No acute fracture or traumatic subluxation.    --- End of Report ---    < end of copied text >  < from: CT Head No Cont (03.13.22 @ 13:22) >  IMPRESSION:  CT head: No evidence of acute transcortical infarct, acute intracranial   hemorrhage or mass effect.    CT cervical spine: No acute fracture or traumatic subluxation.    --- End of Report ---    < end of copied text >    ---------------------------------------------------------------------------------------     TRAUMA ACTIVATION LEVEL:  CODE / ALERT  / CONSULT  ACTIVATED BY: EMS**  /  ED**  INTUBATED: YES** / NO**      MECHANISM OF INJURY:   [x] Blunt     [x] Fall	      GCS: 15 	E: 4	V: 5	M: 6    HPI:    89yF w/ PMHx of HTN, HLD s/p Right ?hip replacement seen as Trauma Alert  s/p unwitnessed fall at [facility], +ht, -loc, +lovenox (prophylactic, post surgical).  The patient states she fell at the nursing home on her right side, does not remember the events leading up to the fall or how she fell, but fell on the right sided that she was recently operated on by orthopedics. The patient is A+OX2, GCS 15 in trauma bay, vital signs are stable. The patient complains of right hip pain and right elbow pain. External signs of trauma include right elbow skin tear and tenderness over the right hip incision site - no noted deformities. The patient is accompanied by her daughter who was not present during the fall.     Trauma assessment in ED: ABCs intact , GCS 15 , AAOx3.    PAST MEDICAL & SURGICAL HISTORY:  HTN (hypertension)    Femur neck fracture  right    History of hip surgery  Right 2022        Allergies    No Known Allergies    Intolerances        Home Medications:  Colace 100 mg oral capsule: 1 cap(s) orally 2 times a day (13 Mar 2022 12:56)  Lovenox 40 mg/0.4 mL injectable solution: 40 milligram(s) injectable once a day (13 Mar 2022 12:56)  Metoprolol Succinate ER 25 mg oral tablet, extended release: 1 tab(s) orally once a day (13 Mar 2022 12:56)  oxyCODONE 5 mg oral tablet: 1 tab(s) orally every 6 hours, As Needed - for moderate pain (13 Mar 2022 12:56)  valsartan-hydrochlorothiazide 160 mg-25 mg oral tablet: 1 tab(s) orally once a day (13 Mar 2022 12:56)      ROS: 10-system review is otherwise negative except HPI above.      Primary Survey:    A - airway intact  B - bilateral breath sounds and good chest rise  C - palpable pulses in all extremities  D - GCS 15 on arrival, SANDERS  Exposure obtained    Vital Signs Last 24 Hrs  T(C): 36.1 (13 Mar 2022 12:20), Max: 36.1 (13 Mar 2022 12:20)  T(F): 97 (13 Mar 2022 12:20), Max: 97 (13 Mar 2022 12:20)  HR: 93 (13 Mar 2022 12:45) (88 - 103)  BP: 135/61 (13 Mar 2022 12:45) (100/50 - 136/88)  BP(mean): 88 (13 Mar 2022 12:45) (88 - 105)  RR: 19 (13 Mar 2022 12:30) (18 - 19)  SpO2: 96% (13 Mar 2022 12:30) (95% - 96%)    Secondary Survey:   General: NAD  HEENT: Normocephalic, atraumatic, EOMI, PEERLA. no scalp lacerations   Neck: Soft, midline trachea. no c-spine tenderness  Chest: No chest wall tenderness, no subcutaneous emphysema   Cardiac: S1, S2, RRR  Respiratory: Bilateral breath sounds, clear and equal bilaterally  Abdomen: Soft, non-distended, non-tender, no rebound, no guarding.  Groin: Normal appearing, pelvis stable   Ext: Right lateral hip incision, incision is clean and dry, no erythema or signs of cellulitis. Unable to elevate right leg. . Bilateral LE 2+pitting edema. Right elbow skin tear Moving b/l upper and left lower extremities  Palpable Radial b/l UE, b/l DP palpable in LE.   Back: No T/L/S spine tenderness, No palpable runoff/stepoff/deformity  Rectal: No jyotsna blood, JENNIFER with good tone    FAST: Negative    ACCESS / DEVICES:  [X ] Peripheral IV    Labs:  Labs:  CAPILLARY BLOOD GLUCOSE      POCT Blood Glucose.: 156 mg/dL (13 Mar 2022 12:28)                          11.7   8.05  )-----------( 482      ( 13 Mar 2022 12:35 )             36.8       Auto Neutrophil %: 78.9 % (03-13-22 @ 12:35)  Auto Immature Granulocyte %: 0.6 % (03-13-22 @ 12:35)    03-13    139  |  92<L>  |  55<H>  ----------------------------<  163<H>  3.7   |  31  |  1.6<H>      Calcium, Total Serum: 9.3 mg/dL (03-13-22 @ 12:35)      LFTs:             6.8  | 0.6  | 23       ------------------[70      ( 13 Mar 2022 12:35 )  3.5  | x    | 10          Lipase:15     Amylase:x         Lactate, Blood: 2.5 mmol/L (03-13-22 @ 12:35)      Coags:     12.00  ----< 1.04    ( 13 Mar 2022 12:35 )     19.6        CARDIAC MARKERS ( 13 Mar 2022 12:35 )  x     / 0.09 ng/mL / x     / x     / x            RADIOLOGY & ADDITIONAL STUDIES:  < from: CT Abdomen and Pelvis w/ IV Cont (03.13.22 @ 13:51) >  IMPRESSION:    Age-indeterminate moderate compression deformities at L5, T10, T9, T8,   T6, T5, and T4. Age-indeterminate mild compression deformities at T11, T3   and T11.    Status post multilevel kyphoplasty at T12 through L4.    Partially imaged somewhat serpiginous fluid collection with a   well-defined thin wall is seen within the musculature adjacent to the   right hip which is status post hemiarthroplasty. Streak artifact limits   evaluation of this collection, which may be post surgical or perhaps   represent a distended greater trochanter bursa. Superimposed infection   cannot be ruled out. Clinical correlation with timeline of surgery and   for infection are recommended.    Punctate nodularity within an emphysematous bleb at the right apex   ((6-43). Consider short-term 3 month follow-up CT to evaluate for   stability.    Suggestion of bladder wall thickening which is out of portion to the   degree of underdistention. Correlation with urinalysis is recommended.    --- End of Report ---    < end of copied text >  < from: CT Chest w/ IV Cont (03.13.22 @ 13:51) >  IMPRESSION:    Age-indeterminate moderate compression deformities at L5, T10, T9, T8,   T6, T5, and T4. Age-indeterminate mild compression deformities at T11, T3   and T11.    Status post multilevel kyphoplasty at T12 through L4.    Partially imaged somewhat serpiginous fluid collection with a   well-defined thin wall is seen within the musculature adjacent to the   right hip which is status post hemiarthroplasty. Streak artifact limits   evaluation of this collection, which may be post surgical or perhaps   represent a distended greater trochanter bursa. Superimposed infection   cannot be ruled out. Clinical correlation with timeline of surgery and   for infection are recommended.    Punctate nodularity within an emphysematous bleb at the right apex   ((6-43). Consider short-term 3 month follow-up CT to evaluate for   stability.    Suggestion of bladder wall thickening which is out of portion to the   degree of underdistention. Correlation with urinalysis is recommended.    --- End of Report ---      < end of copied text >  < from: CT Cervical Spine No Cont (03.13.22 @ 13:23) >  IMPRESSION:  CT head: No evidence of acute transcortical infarct, acute intracranial   hemorrhage or mass effect.    CT cervical spine: No acute fracture or traumatic subluxation.    --- End of Report ---    < end of copied text >  < from: CT Head No Cont (03.13.22 @ 13:22) >  IMPRESSION:  CT head: No evidence of acute transcortical infarct, acute intracranial   hemorrhage or mass effect.    CT cervical spine: No acute fracture or traumatic subluxation.    --- End of Report ---    < end of copied text >    ---------------------------------------------------------------------------------------

## 2022-03-14 LAB
ALBUMIN SERPL ELPH-MCNC: 2.9 G/DL — LOW (ref 3.5–5.2)
ALP SERPL-CCNC: 58 U/L — SIGNIFICANT CHANGE UP (ref 30–115)
ALT FLD-CCNC: 7 U/L — SIGNIFICANT CHANGE UP (ref 0–41)
ANION GAP SERPL CALC-SCNC: 14 MMOL/L — SIGNIFICANT CHANGE UP (ref 7–14)
AST SERPL-CCNC: 16 U/L — SIGNIFICANT CHANGE UP (ref 0–41)
BASOPHILS # BLD AUTO: 0.03 K/UL — SIGNIFICANT CHANGE UP (ref 0–0.2)
BASOPHILS NFR BLD AUTO: 0.5 % — SIGNIFICANT CHANGE UP (ref 0–1)
BILIRUB SERPL-MCNC: 0.4 MG/DL — SIGNIFICANT CHANGE UP (ref 0.2–1.2)
BLD GP AB SCN SERPL QL: SIGNIFICANT CHANGE UP
BUN SERPL-MCNC: 48 MG/DL — HIGH (ref 10–20)
CALCIUM SERPL-MCNC: 8.4 MG/DL — LOW (ref 8.5–10.1)
CHLORIDE SERPL-SCNC: 96 MMOL/L — LOW (ref 98–110)
CK SERPL-CCNC: 70 U/L — SIGNIFICANT CHANGE UP (ref 0–225)
CO2 SERPL-SCNC: 30 MMOL/L — SIGNIFICANT CHANGE UP (ref 17–32)
CREAT SERPL-MCNC: 1.3 MG/DL — SIGNIFICANT CHANGE UP (ref 0.7–1.5)
EGFR: 38 ML/MIN/1.73M2 — LOW
EOSINOPHIL # BLD AUTO: 0.08 K/UL — SIGNIFICANT CHANGE UP (ref 0–0.7)
EOSINOPHIL NFR BLD AUTO: 1.3 % — SIGNIFICANT CHANGE UP (ref 0–8)
GLUCOSE SERPL-MCNC: 91 MG/DL — SIGNIFICANT CHANGE UP (ref 70–99)
HCT VFR BLD CALC: 30.7 % — LOW (ref 37–47)
HGB BLD-MCNC: 9.8 G/DL — LOW (ref 12–16)
IMM GRANULOCYTES NFR BLD AUTO: 0.3 % — SIGNIFICANT CHANGE UP (ref 0.1–0.3)
LYMPHOCYTES # BLD AUTO: 1.18 K/UL — LOW (ref 1.2–3.4)
LYMPHOCYTES # BLD AUTO: 18.8 % — LOW (ref 20.5–51.1)
MAGNESIUM SERPL-MCNC: 2.2 MG/DL — SIGNIFICANT CHANGE UP (ref 1.8–2.4)
MCHC RBC-ENTMCNC: 29.1 PG — SIGNIFICANT CHANGE UP (ref 27–31)
MCHC RBC-ENTMCNC: 31.9 G/DL — LOW (ref 32–37)
MCV RBC AUTO: 91.1 FL — SIGNIFICANT CHANGE UP (ref 81–99)
MONOCYTES # BLD AUTO: 0.65 K/UL — HIGH (ref 0.1–0.6)
MONOCYTES NFR BLD AUTO: 10.3 % — HIGH (ref 1.7–9.3)
NEUTROPHILS # BLD AUTO: 4.33 K/UL — SIGNIFICANT CHANGE UP (ref 1.4–6.5)
NEUTROPHILS NFR BLD AUTO: 68.8 % — SIGNIFICANT CHANGE UP (ref 42.2–75.2)
NRBC # BLD: 0 /100 WBCS — SIGNIFICANT CHANGE UP (ref 0–0)
PLATELET # BLD AUTO: 396 K/UL — SIGNIFICANT CHANGE UP (ref 130–400)
POTASSIUM SERPL-MCNC: 3.4 MMOL/L — LOW (ref 3.5–5)
POTASSIUM SERPL-SCNC: 3.4 MMOL/L — LOW (ref 3.5–5)
PROT SERPL-MCNC: 5.7 G/DL — LOW (ref 6–8)
RBC # BLD: 3.37 M/UL — LOW (ref 4.2–5.4)
RBC # FLD: 15.5 % — HIGH (ref 11.5–14.5)
SODIUM SERPL-SCNC: 140 MMOL/L — SIGNIFICANT CHANGE UP (ref 135–146)
TROPONIN T SERPL-MCNC: 0.09 NG/ML — CRITICAL HIGH
WBC # BLD: 6.29 K/UL — SIGNIFICANT CHANGE UP (ref 4.8–10.8)
WBC # FLD AUTO: 6.29 K/UL — SIGNIFICANT CHANGE UP (ref 4.8–10.8)

## 2022-03-14 PROCEDURE — 95816 EEG AWAKE AND DROWSY: CPT | Mod: 26

## 2022-03-14 PROCEDURE — 93970 EXTREMITY STUDY: CPT | Mod: 26

## 2022-03-14 PROCEDURE — 99233 SBSQ HOSP IP/OBS HIGH 50: CPT

## 2022-03-14 PROCEDURE — ZZZZZ: CPT

## 2022-03-14 PROCEDURE — 99222 1ST HOSP IP/OBS MODERATE 55: CPT

## 2022-03-14 RX ORDER — POTASSIUM CHLORIDE 20 MEQ
20 PACKET (EA) ORAL ONCE
Refills: 0 | Status: DISCONTINUED | OUTPATIENT
Start: 2022-03-14 | End: 2022-03-15

## 2022-03-14 RX ADMIN — OXYCODONE HYDROCHLORIDE 5 MILLIGRAM(S): 5 TABLET ORAL at 03:56

## 2022-03-14 RX ADMIN — VALSARTAN 160 MILLIGRAM(S): 80 TABLET ORAL at 05:35

## 2022-03-14 RX ADMIN — SENNA PLUS 2 TABLET(S): 8.6 TABLET ORAL at 22:03

## 2022-03-14 RX ADMIN — OXYCODONE HYDROCHLORIDE 5 MILLIGRAM(S): 5 TABLET ORAL at 00:41

## 2022-03-14 RX ADMIN — ENOXAPARIN SODIUM 40 MILLIGRAM(S): 100 INJECTION SUBCUTANEOUS at 22:03

## 2022-03-14 RX ADMIN — Medication 25 MILLIGRAM(S): at 05:35

## 2022-03-14 NOTE — PROGRESS NOTE ADULT - SUBJECTIVE AND OBJECTIVE BOX
CHIEF COMPLAINT:    Patient is a 89y old  Female who presents with a chief complaint of Unwitnessed fall     INTERVAL HPI/OVERNIGHT EVENTS:    Patient seen and examined at bedside. No acute overnight events occurred.    ROS: Denies SOB, chest pain. All other systems are negative.    Vital Signs:    T(F): 98.1 (22 @ 05:10), Max: 98.1 (22 @ 05:10)  HR: 80 (22 @ 05:10) (69 - 103)  BP: 100/50 (22 @ 05:10) (100/50 - 155/69)  RR: 18 (22 @ 05:10) (18 - 18)  SpO2: --  I&O's Summary    13 Mar 2022 07:01  -  14 Mar 2022 07:00  --------------------------------------------------------  IN: 120 mL / OUT: 0 mL / NET: 120 mL    14 Mar 2022 07:01  -  14 Mar 2022 12:38  --------------------------------------------------------  IN: 0 mL / OUT: 0 mL / NET: 0 mL      Daily Height in cm: 152.4 (13 Mar 2022 20:00)    Daily Weight in k.3 (14 Mar 2022 05:10)  CAPILLARY BLOOD GLUCOSE          PHYSICAL EXAM:  GENERAL:  NAD  SKIN: No rashes or lesions  HEENT: Atraumatic. Normocephalic. Anicteric  NECK:  No JVD.   PULMONARY: Clear to ausculation bilaterally. No wheezing. No rales  CVS: Normal S1, S2. Regular rate and rhythm. No murmurs.  ABDOMEN/GI: Soft, Nontender, Nondistended; Bowel sounds are present  EXTREMITIES:  No edema B/L LE.  NEUROLOGIC:  No motor deficit.  PSYCH: Alert & oriented x 2 to person and place, normal affect      LABS:                        9.8    6.29  )-----------( 396      ( 14 Mar 2022 04:30 )             30.7     -    140  |  96<L>  |  48<H>  ----------------------------<  91  3.4<L>   |  30  |  1.3    Ca    8.4<L>      14 Mar 2022 04:30  Mg     2.2         TPro  5.7<L>  /  Alb  2.9<L>  /  TBili  0.4  /  DBili  x   /  AST  16  /  ALT  7   /  AlkPhos  58      PT/INR - ( 13 Mar 2022 12:35 )   PT: 12.00 sec;   INR: 1.04 ratio         PTT - ( 13 Mar 2022 12:35 )  PTT:19.6 sec    Trop 0.09, CKMB --, CK --, 22 @ 23:30  Trop 0.06, CKMB --, CK --, 22 @ 21:44  Trop 0.09, CKMB --, CK --, 22 @ 12:35        RADIOLOGY & ADDITIONAL TESTS:  Imaging or report Personally Reviewed:  [ ] YES  [ ] NO    Telemetry reviewed independently - no acute events, EKG with left axis deviation    Medications:  Standing  enoxaparin Injectable 40 milliGRAM(s) SubCutaneous every 24 hours  hydrochlorothiazide 25 milliGRAM(s) Oral daily  metoprolol succinate ER 25 milliGRAM(s) Oral daily  potassium chloride    Tablet ER 20 milliEquivalent(s) Oral once  senna 2 Tablet(s) Oral at bedtime  valsartan 160 milliGRAM(s) Oral daily    PRN Meds  oxyCODONE    IR 5 milliGRAM(s) Oral every 6 hours PRN      Case discussed with resident  Care discussed with pt

## 2022-03-14 NOTE — CONSULT NOTE ADULT - SUBJECTIVE AND OBJECTIVE BOX
HPI:  89yF w/ PMHx of HTN, HLD, recurrent falls, s/p Right hemiarthroplasty 2 weeks ago BIBEMS s/p unwitnessed fall at [facility], +ht, -loc, +lovenox (prophylactic, post surgical).  The patient states she fell at the nursing home on her right side, does not remember the events leading up to the fall or how she fell, but fell on the right sided that she was recently operated on by orthopedics.     The patient is AOX3 at the time of my exam, GCS 15, and hemodynamically stable. She reports that her hip pain is better than yesterday.  Trauma workup was negative. Cleared by trauma, neurosurgery and orthopedics with WBAT on RLE.  Trop 0.09 -->  0.06 --> 0.09  Cr 1.6 --> 1.3    PAST MEDICAL & SURGICAL HISTORY  HTN (hypertension)    Femur neck fracture  right    History of hip surgery  Right 2022        FAMILY HISTORY:  FAMILY HISTORY:  Patient unable to provide medical history        SOCIAL HISTORY:  []smoker  []Alcohol  []Drug    ALLERGIES:  No Known Allergies      MEDICATIONS:  MEDICATIONS  (STANDING):  enoxaparin Injectable 40 milliGRAM(s) SubCutaneous every 24 hours  hydrochlorothiazide 25 milliGRAM(s) Oral daily  metoprolol succinate ER 25 milliGRAM(s) Oral daily  senna 2 Tablet(s) Oral at bedtime  valsartan 160 milliGRAM(s) Oral daily    MEDICATIONS  (PRN):  oxyCODONE    IR 5 milliGRAM(s) Oral every 6 hours PRN for moderate pain      HOME MEDICATIONS:  Home Medications:  Colace 100 mg oral capsule: 1 cap(s) orally 2 times a day (13 Mar 2022 12:56)  Lovenox 40 mg/0.4 mL injectable solution: 40 milligram(s) injectable once a day (13 Mar 2022 12:56)  Metoprolol Succinate ER 25 mg oral tablet, extended release: 1 tab(s) orally once a day (13 Mar 2022 12:56)  oxyCODONE 5 mg oral tablet: 1 tab(s) orally every 6 hours, As Needed - for moderate pain (13 Mar 2022 12:56)  valsartan-hydrochlorothiazide 160 mg-25 mg oral tablet: 1 tab(s) orally once a day (13 Mar 2022 12:56)      VITALS:   T(F): 98.1 (03-14 @ 05:10), Max: 98.1 (03-14 @ 05:10)  HR: 80 (03-14 @ 05:10) (69 - 103)  BP: 100/50 (03-14 @ 05:10) (100/50 - 155/69)  BP(mean): 88 (03-13 @ 12:45) (88 - 105)  RR: 18 (03-14 @ 05:10) (18 - 19)  SpO2: 96% (03-13 @ 12:30) (95% - 96%)    I&O's Summary    13 Mar 2022 07:01  -  14 Mar 2022 07:00  --------------------------------------------------------  IN: 120 mL / OUT: 0 mL / NET: 120 mL    14 Mar 2022 07:01  -  14 Mar 2022 10:20  --------------------------------------------------------  IN: 0 mL / OUT: 0 mL / NET: 0 mL        REVIEW OF SYSTEMS:  CONSTITUTIONAL: No weakness, fevers or chills  EYES: No visual changes  ENT: No vertigo or throat pain   NECK: No pain or stiffness  RESPIRATORY: No cough, wheezing, hemoptysis; No shortness of breath  CARDIOVASCULAR: No chest pain or palpitations  GASTROINTESTINAL: No abdominal or epigastric pain. No nausea, vomiting, or hematemesis; No diarrhea or constipation. No melena or hematochezia.  GENITOURINARY: No dysuria, frequency or hematuria  NEUROLOGICAL: No numbness or weakness  SKIN: No itching, no rashes  MSK: no    PHYSICAL EXAM:  NEURO: patient is awake , alert and oriented  GEN: Not in acute distress  NECK: no thyroid enlargement, no JVD  LUNGS: Clear to auscultation bilaterally   CARDIOVASCULAR: S1/S2 present, RRR , no murmurs or rubs, no carotid bruits,  + PP bilaterally  ABD: Soft, non-tender, non-distended, +BS  EXT: No SABINA  SKIN: Intact    LABS:                        9.8    6.29  )-----------( 396      ( 14 Mar 2022 04:30 )             30.7     03-14    140  |  96<L>  |  48<H>  ----------------------------<  91  3.4<L>   |  30  |  1.3    Ca    8.4<L>      14 Mar 2022 04:30  Mg     2.2     03-14    TPro  5.7<L>  /  Alb  2.9<L>  /  TBili  0.4  /  DBili  x   /  AST  16  /  ALT  7   /  AlkPhos  58  03-14    PT/INR - ( 13 Mar 2022 12:35 )   PT: 12.00 sec;   INR: 1.04 ratio         PTT - ( 13 Mar 2022 12:35 )  PTT:19.6 sec  Troponin T, Serum: 0.09 ng/mL *HH* (03-13-22 @ 23:30)  Troponin T, Serum: 0.06 ng/mL *HH* (03-13-22 @ 21:44)  Lactate, Blood: 2.5 mmol/L *H* (03-13-22 @ 12:35)  Troponin T, Serum: 0.09 ng/mL *HH* (03-13-22 @ 12:35)    CARDIAC MARKERS ( 13 Mar 2022 23:30 )  x     / 0.09 ng/mL / x     / x     / x      CARDIAC MARKERS ( 13 Mar 2022 21:44 )  x     / 0.06 ng/mL / x     / x     / x      CARDIAC MARKERS ( 13 Mar 2022 12:35 )  x     / 0.09 ng/mL / x     / x     / x            Troponin trend:  Trop 0.09 -->  0.06 --> 0.09      RADIOLOGY: none available  -CXR:  -TTE:  -CCTA:  -STRESS TEST:  -CATHETERIZATION:      ECG:  Ventricular Rate 88 BPM  Atrial Rate 88 BPM  P-R Interval 178 ms  QRS Duration 102 ms  Q-T Interval 398 ms  QTC Calculation(Bazett) 481 ms  P Axis 70 degrees  R Axis -33 degrees  T Axis 70 degrees    Diagnosis Line Sinus rhythm with Premature supraventricular complexes  Left axis deviation  Abnormal ECG        TELEMETRY EVENTS:  NONE.   HPI:  89yF w/ PMHx of HTN, HLD, recurrent falls, s/p Right hemiarthroplasty 2 weeks ago BIBEMS s/p unwitnessed fall at [facility], +ht, -loc, +lovenox (prophylactic, post surgical).  The patient states she fell at the nursing home on her right side, does not remember the events leading up to the fall or how she fell, but fell on the right sided that she was recently operated on by orthopedics.     The patient is AOX3 at the time of my exam, GCS 15, and hemodynamically stable. She reports that her hip pain is better than yesterday.  Trauma workup was negative. Cleared by trauma, neurosurgery and orthopedics with WBAT on RLE.    Trop 0.09 -->  0.06 --> 0.09  Cr 1.6 --> 1.3    PAST MEDICAL & SURGICAL HISTORY  HTN (hypertension)    Femur neck fracture  right    History of hip surgery  Right 2022      FAMILY HISTORY:  Patient unable to provide medical history    SOCIAL HISTORY:  Unable to obtain    ALLERGIES:  No Known Allergies      MEDICATIONS:  MEDICATIONS  (STANDING):  enoxaparin Injectable 40 milliGRAM(s) SubCutaneous every 24 hours  hydrochlorothiazide 25 milliGRAM(s) Oral daily  metoprolol succinate ER 25 milliGRAM(s) Oral daily  senna 2 Tablet(s) Oral at bedtime  valsartan 160 milliGRAM(s) Oral daily    MEDICATIONS  (PRN):  oxyCODONE    IR 5 milliGRAM(s) Oral every 6 hours PRN for moderate pain      HOME MEDICATIONS:  Home Medications:  Colace 100 mg oral capsule: 1 cap(s) orally 2 times a day (13 Mar 2022 12:56)  Lovenox 40 mg/0.4 mL injectable solution: 40 milligram(s) injectable once a day (13 Mar 2022 12:56)  Metoprolol Succinate ER 25 mg oral tablet, extended release: 1 tab(s) orally once a day (13 Mar 2022 12:56)  oxyCODONE 5 mg oral tablet: 1 tab(s) orally every 6 hours, As Needed - for moderate pain (13 Mar 2022 12:56)  valsartan-hydrochlorothiazide 160 mg-25 mg oral tablet: 1 tab(s) orally once a day (13 Mar 2022 12:56)      VITALS:   T(F): 98.1 (03-14 @ 05:10), Max: 98.1 (03-14 @ 05:10)  HR: 80 (03-14 @ 05:10) (69 - 103)  BP: 100/50 (03-14 @ 05:10) (100/50 - 155/69)  BP(mean): 88 (03-13 @ 12:45) (88 - 105)  RR: 18 (03-14 @ 05:10) (18 - 19)  SpO2: 96% (03-13 @ 12:30) (95% - 96%)    I&O's Summary    13 Mar 2022 07:01  -  14 Mar 2022 07:00  --------------------------------------------------------  IN: 120 mL / OUT: 0 mL / NET: 120 mL    14 Mar 2022 07:01  -  14 Mar 2022 10:20  --------------------------------------------------------  IN: 0 mL / OUT: 0 mL / NET: 0 mL        REVIEW OF SYSTEMS:  Unable to obtain    PHYSICAL EXAM:  GEN: NAD  NECK:  no JVD  LUNGS: Clear to auscultation bilaterally   CARDIOVASCULAR: S1/S2 present, RRR   ABD: Soft, non-tender, non-distended, +BS  EXT: No SABINA  SKIN: Intact    LABS:                        9.8    6.29  )-----------( 396      ( 14 Mar 2022 04:30 )             30.7     03-14    140  |  96<L>  |  48<H>  ----------------------------<  91  3.4<L>   |  30  |  1.3    Ca    8.4<L>      14 Mar 2022 04:30  Mg     2.2     03-14    TPro  5.7<L>  /  Alb  2.9<L>  /  TBili  0.4  /  DBili  x   /  AST  16  /  ALT  7   /  AlkPhos  58  03-14    PT/INR - ( 13 Mar 2022 12:35 )   PT: 12.00 sec;   INR: 1.04 ratio      PTT - ( 13 Mar 2022 12:35 )  PTT:19.6 sec    Troponin T, Serum: 0.09 ng/mL *HH* (03-13-22 @ 23:30)  Troponin T, Serum: 0.06 ng/mL ** (03-13-22 @ 21:44)  Troponin T, Serum: 0.09 ng/mL *HH* (03-13-22 @ 12:35)        ECG:  Ventricular Rate 88 BPM  Atrial Rate 88 BPM  P-R Interval 178 ms  QRS Duration 102 ms  Q-T Interval 398 ms  QTC Calculation(Bazett) 481 ms  P Axis 70 degrees  R Axis -33 degrees  T Axis 70 degrees    Diagnosis Line Sinus rhythm with Premature supraventricular complexes  Left axis deviation  Abnormal ECG        TELEMETRY EVENTS:  NONE.

## 2022-03-14 NOTE — CONSULT NOTE ADULT - ASSESSMENT
Thank you for the courtesy of the consult:    This is a case of a 89yF w/ PMHx of HTN, HLD, recurrent falls, s/p Right hemiarthroplasty 2 weeks ago BIBEMS s/p unwitnessed fall at [facility], +ht, -loc, +lovenox (prophylactic, post surgical).  The patient states she fell at the nursing home on her right side, does not remember the events leading up to the fall or how she fell, but fell on the right sided that she was recently operated on by orthopedics.     Trop 0.09 -->  0.06 --> 0.09  Cr 1.6 --> 1.3    Impression:  # Type II troponemia secondary to DIMAS    Recs:  - No evidence of ACS/Unstable angina/NSTEMI  - Avoid nephrotoxic medications  - Hold valsartan for now  - Consider adding amlodipine in case of increasing BP  - Continue cardiac monitoring  - F/u TTE  - Follow-up with neurology for causes of unwitnessed fall including but not limited to subclinical seizure activity, opoid medications, gait stability, motor strength      *** This is a preliminary resident note; finalized recs pending case discussion with the cardiologist ***   Thank you for the courtesy of the consult:    This is a case of a 89yF w/ PMHx of HTN, HLD, recurrent falls, s/p Right hemiarthroplasty 2 weeks ago BIBEMS s/p unwitnessed fall at [facility], +ht, -loc, +lovenox (prophylactic, post surgical).  The patient states she fell at the nursing home on her right side, does not remember the events leading up to the fall or how she fell, but fell on the right sided that she was recently operated on by orthopedics.     Trop 0.09 -->  0.06 --> 0.09  Cr 1.6 --> 1.3    Impression:  # Type II troponemia secondary to DIMAS    Recs:  - No evidence of ACS/Unstable angina/NSTEMI  - Avoid nephrotoxic medications  - Hold valsartan for now  - Consider adding amlodipine in case of increasing BP  - Continue cardiac monitoring  - F/u TTE  - Follow-up with neurology for causes of unwitnessed fall including but not limited to subclinical seizure activity, opoid medications, gait stability, motor strength   This is a case of a 89yF w/ PMHx of HTN, HLD, recurrent falls, s/p Right hemiarthroplasty 2 weeks ago BIBEMS s/p unwitnessed fall at [facility], +ht, -loc, +lovenox (prophylactic, post surgical).  The patient states she fell at the nursing home on her right side, does not remember the events leading up to the fall or how she fell, but fell on the right sided that she was recently operated on by orthopedics.     Trop 0.09 -->  0.06 --> 0.09  Cr 1.6 --> 1.3    Impression:  # Elevated troponin secondary to DIAMS    Recs:  - No evidence of ACS/Unstable angina/NSTEMI  - Avoid nephrotoxic medications  - Hold valsartan until renal function normalizes  - Consider adding amlodipine in case of increasing BP  - Follow-up with neurology for causes of unwitnessed fall including but not limited to subclinical seizure activity, opoid medications, gait stability, motor strength   This is a case of a 89yF w/ PMHx of HTN, HLD, recurrent falls, s/p Right hemiarthroplasty 2 weeks ago BIBEMS s/p unwitnessed fall at [facility], +ht, -loc, +lovenox (prophylactic, post surgical).  The patient states she fell at the nursing home on her right side, does not remember the events leading up to the fall or how she fell, but fell on the right sided that she was recently operated on by orthopedics.     Trop 0.09 -->  0.06 --> 0.09  Cr 1.6 --> 1.3    Impression:  # Elevated troponin secondary to DIMAS    Recs:  - No evidence of ACS/Unstable angina/NSTEMI  - Avoid nephrotoxic medications  - Consider adding amlodipine in case of increasing BP  - Follow-up with neurology for causes of unwitnessed fall including but not limited to subclinical seizure activity, opoid medications, gait stability, motor strength

## 2022-03-14 NOTE — CONSULT NOTE ADULT - ATTENDING COMMENTS
89 yo F with asymptomatic minimal troponin elevation in the setting of DIMAS, recurrent falls, s/p right hemiarthroplasty two weeks ago    - No further cardiac workup indicated at this time  - Recall PRN 89 yo F with asymptomatic troponin elevation in the setting of DIMAS, recurrent falls, s/p right hemiarthroplasty two weeks ago.  No ACS/NSTEMI.    Echo ordered    - Telemetry for 24-48 hrs  - Resume Valsartan when renal function normalizes  - Not a candidate for invasive angiography  - Continue Metoprolol and HCTZ  - Recall PRN

## 2022-03-14 NOTE — CHART NOTE - NSCHARTNOTEFT_GEN_A_CORE
Tertiary Trauma Survey (TTS)    Date of TTS: 03-14-22 @ 10:57   Admit Date: 03-13-22  Trauma Activation: CODE / ALERT / CONSULT  Admit GCS:15        E-4     V-5     M-6     HPI:  89yF w/ PMHx of HTN, HLD s/p Right ?hip replacement seen as Trauma Alert  s/p unwitnessed fall at [facility], +ht, -loc, +lovenox (prophylactic, post surgical).  The patient states she fell at the nursing home on her right side, does not remember the events leading up to the fall or how she fell, but fell on the right sided that she was recently operated on by orthopedics. The patient is A+OX2, GCS 15 in trauma bay, vital signs are stable. The patient complains of right hip pain and right elbow pain. External signs of trauma include right elbow skin tear and tenderness over the right hip incision site - no noted deformities. The patient is accompanied by her daughter who was not present during the fall.   Trauma assessment in ED: ABCs intact , GCS 15 , AAOx3.    Tertiary Exam: Patient seen and examined at bedside, currently endorses no pain/tenderness or worsening symptoms. Patient is tolerating diet and has no fevers/chills/nausea. Patient is voiding and passing flatus without difficulty. Patient is currently not ambulating or having bowel movements.    PHYSICAL EXAM:  General: NAD  HEENT: Normocephalic, atraumatic, EOMI, PEERLA. no scalp lacerations   Neck: Soft, midline trachea. no c-spine tenderness  Chest: No chest wall tenderness, no subcutaneous emphysema   Cardiac: S1, S2, RRR  Respiratory: Bilateral breath sounds, clear and equal bilaterally  Abdomen: Soft, non-distended, non-tender, no rebound, no guarding.  Groin: Normal appearing, pelvis stable   Ext: Right lateral hip incision, incision is clean and dry, no erythema or signs of cellulitis. Unable to elevate right leg. . Bilateral LE 2+pitting edema. Right elbow skin tear Moving b/l upper and left lower extremities  Palpable Radial b/l UE, b/l DP palpable in LE.   Back: No T/L/S spine tenderness, No palpable runoff/stepoff/deformity      Vital Signs Last 24 Hrs  T(C): 36.7 (14 Mar 2022 05:10), Max: 36.7 (14 Mar 2022 05:10)  T(F): 98.1 (14 Mar 2022 05:10), Max: 98.1 (14 Mar 2022 05:10)  HR: 80 (14 Mar 2022 05:10) (69 - 103)  BP: 100/50 (14 Mar 2022 05:10) (100/50 - 155/69)  BP(mean): 88 (13 Mar 2022 12:45) (88 - 105)  RR: 18 (14 Mar 2022 05:10) (18 - 19)  SpO2: 96% (13 Mar 2022 12:30) (95% - 96%)    Labs:  CAPILLARY BLOOD GLUCOSE      POCT Blood Glucose.: 156 mg/dL (13 Mar 2022 12:28)                          9.8    6.29  )-----------( 396      ( 14 Mar 2022 04:30 )             30.7       Auto Neutrophil %: 68.8 % (03-14-22 @ 04:30)  Auto Immature Granulocyte %: 0.3 % (03-14-22 @ 04:30)  Auto Neutrophil %: 78.9 % (03-13-22 @ 12:35)  Auto Immature Granulocyte %: 0.6 % (03-13-22 @ 12:35)    03-14    140  |  96<L>  |  48<H>  ----------------------------<  91  3.4<L>   |  30  |  1.3      Calcium, Total Serum: 8.4 mg/dL (03-14-22 @ 04:30)      LFTs:             5.7  | 0.4  | 16       ------------------[58      ( 14 Mar 2022 04:30 )  2.9  | x    | 7           Lipase:x      Amylase:x         Lactate, Blood: 2.5 mmol/L (03-13-22 @ 12:35)      Coags:     12.00  ----< 1.04    ( 13 Mar 2022 12:35 )     19.6        CARDIAC MARKERS ( 13 Mar 2022 23:30 )  x     / 0.09 ng/mL / x     / x     / x      CARDIAC MARKERS ( 13 Mar 2022 21:44 )  x     / 0.06 ng/mL / x     / x     / x      CARDIAC MARKERS ( 13 Mar 2022 12:35 )  x     / 0.09 ng/mL / x     / x     / x            Alcohol, Blood: <10 mg/dL (03-13-22 @ 12:35)      Alcohol, Blood: <10 mg/dL (03-13-22 @ 12:35)      RADIOLOGICAL FINDINGS REVIEW:  < from: CT Abdomen and Pelvis w/ IV Cont (03.13.22 @ 13:51) >  IMPRESSION:    Age-indeterminate moderate compression deformities at L5, T10, T9, T8,   T6, T5, and T4. Age-indeterminate mild compression deformities at T11, T3   and T11.    Status post multilevel kyphoplasty at T12 through L4.    Partially imaged somewhat serpiginous fluid collection with a   well-defined thin wall is seen within the musculature adjacent to the   right hip which is status post hemiarthroplasty. Streak artifact limits   evaluation of this collection, which may be post surgical or perhaps   represent a distended greater trochanter bursa. Superimposed infection   cannot be ruled out. Clinical correlation with timeline of surgery and   for infection are recommended.    Punctate nodularity within an emphysematous bleb at the right apex   ((6-43). Consider short-term 3 month follow-up CT to evaluate for   stability.    Suggestion of bladder wall thickening which is out of portion to the   degree of underdistention. Correlation with urinalysis is recommended.    --- End of Report ---    < end of copied text >  < from: CT Chest w/ IV Cont (03.13.22 @ 13:51) >  IMPRESSION:    Age-indeterminate moderate compression deformities at L5, T10, T9, T8,   T6, T5, and T4. Age-indeterminate mild compression deformities at T11, T3   and T11.    Status post multilevel kyphoplasty at T12 through L4.    Partially imaged somewhat serpiginous fluid collection with a   well-defined thin wall is seen within the musculature adjacent to the   right hip which is status post hemiarthroplasty. Streak artifact limits   evaluation of this collection, which may be post surgical or perhaps   represent a distended greater trochanter bursa. Superimposed infection   cannot be ruled out. Clinical correlation with timeline of surgery and   for infection are recommended.    Punctate nodularity within an emphysematous bleb at the right apex   ((6-43). Consider short-term 3 month follow-up CT to evaluate for   stability.    Suggestion of bladder wall thickening which is out of portion to the   degree of underdistention. Correlation with urinalysis is recommended.    --- End of Report ---      < end of copied text >  < from: CT Cervical Spine No Cont (03.13.22 @ 13:23) >  IMPRESSION:  CT head: No evidence of acute transcortical infarct, acute intracranial   hemorrhage or mass effect.    CT cervical spine: No acute fracture or traumatic subluxation.    --- End of Report ---    < end of copied text >  < from: CT Head No Cont (03.13.22 @ 13:22) >  IMPRESSION:  CT head: No evidence of acute transcortical infarct, acute intracranial   hemorrhage or mass effect.    CT cervical spine: No acute fracture or traumatic subluxation.    --- End of Report ---    < end of copied text >      ASSESSMENT  89yF w/ PMHx of HTN, HLD s/p Right ?hip replacement seen as Trauma Alert  s/p unwitnessed fall at [facility], +ht, -loc, +lovenox (prophylactic, post surgical).  The patient is A+OX2, GCS 15 in trauma bay.    PLAN:  - All images/reports reviewed. No further traumatic work-up warranted.

## 2022-03-14 NOTE — PROGRESS NOTE ADULT - ASSESSMENT
Patient is a 89y old Female with pmh htn, hld, femur neck fracture s/p recent hip replacement surgery who presents following an unwitnessed fall at [facility]. She did not remember the events surrounding her fall, but stated she fell on her right side and was experiencing pain in her right hip and elbow. Her hgb dropped from 11.7-9.8 over the past day.    #Mechanical fall vs. syncope  - No acute traumatic injuries identified on imaging  - Seen by trauma and orthopedics  - TTE   - Continue with oxycodone 5mg q6hr prn for pain    #Elevated Troponin levels  - Order a CK level to assess for rhabdo s/p mechanical fall    #Anemia hgb 9.8  - Continue to trend hgb      # Elevated Trop   - most likely type II  - Trop 0.09 --> 0.06  - EKG: NSR with no acute ST changes     # HTN  - c/w toprol  - hold HCTZ, valsartan for now   - if bp elevated start amlodipine     # DVT ppx  - c/w heparin     # Full code Patient is a 89y old Female with pmh htn, hld, femur neck fracture s/p recent hip replacement surgery who presents following an unwitnessed fall at [facility]. She did not remember the events surrounding her fall, but stated she fell on her right side and was experiencing pain in her right hip and elbow. Her hgb dropped from 11.7-9.8 over the past day.    #Mechanical fall vs. syncope  - No acute traumatic injuries identified on imaging  - Seen by trauma and orthopedics  - TTE   - Continue with oxycodone 5mg q6hr prn for pain    #Elevated Troponin levels  - Order a CK level to assess for rhabdo s/p mechanical fall    #Anemia hgb 9.8  - Continue to trend hgb      # Elevated Trop   - most likely type II secondary to DIMAS  - Hold valsartan per cardiology note  - Trop 0.09 --> 0.06  - EKG: NSR with no acute ST changes     # HTN  - c/w toprol  - hold HCTZ, valsartan for now   - if bp elevated start amlodipine     # DVT ppx  - c/w heparin     # Full code

## 2022-03-14 NOTE — PROGRESS NOTE ADULT - ASSESSMENT
89 yo F PMHx falls, dementia, HTN, HLD, recent Right hip replacement (2 weeks ago) who presented for unwitnessed fall. I obtained further information from pt's daughter. Daughter reports two weeks ago patient was walking upstairs and fell which resulted in a hip fracture. Pt was brought to [facility] and underwent surgery. The night of presentation pt got up out of bed by herself to go to the bathroom (she has not walked since surgery without supervision) and fell. Pt does not know the circumstances surround fall.    Unwitnessed fall  - unclear if mechanical or syncopal  - need cardiac workup given LAD, fall, and elevated troponin  - echo pending  - cardio eval (pt saw Dr. Yoon years ago)  - c/w telemetry monitoring  - obtain records from [facility]  - pt eval  - check orthostatics  - doubt PE as cause given no hypoxia, tachycardia    Right hip fracture  - s/p repair two weeks ago  - obtain records from [facility]  - pain control  - pt has right unilateral leg swelling likely due to surgery but check duplex to r/o DVT      DIMAS on suspected CKD 3  - likely due to dehydration as hgb decreased today when SCr stablized  - dc fluids  - can resume hctz, valsartan    Anemia  - pt likely hemoconcentrated on admission as all cell lines decreased as well as Scr  - monitor  - obtain records from [facility] to see baseline    Elevated Trop   - most likely type II  - Trop 0.09 --> 0.06  - EKG: NSR with no acute ST changes   - cardio eval as MI could be cause of syncope     HTN  - c/w toprol  - can resumed HCTZ, valsartan for now     DVT ppx  - c/w heparin     Full code    Progress Note Handoff:  Pending (specify):  Echo, cardio  Family discussion: spoke with daughter via phone regarding history. Aware of pending cardio  Disposition: Home___/SNF_x__/Other________/Unknown at this time________

## 2022-03-14 NOTE — PROGRESS NOTE ADULT - SUBJECTIVE AND OBJECTIVE BOX
MEIDEANNA LOREE  89y  Female      Patient is a 89y old Female with pmh htn, hld, femur neck fracture s/p recent hip replacement surgery who presents following an unwitnessed fall at [facility]. She did not remember the events surrounding her fall, but stated she fell on her right side and was experiencing pain in her right hip and elbow. She was an unreliable historian, but stated that she thinks her surgery was done about 1 month ago.     Today, she mentions that her hip and elbow pain is unchanged from prior. Her right hip incision site was clean dry and intact. Of note, her HGB dropped from 11.7 to 9.8 over the past day. She denied any headache, vision changes, chest pain, sob, NVD. (14 Mar 2022 10:15)      INTERVAL HPI/OVERNIGHT EVENTS:      REVIEW OF SYSTEMS:  CONSTITUTIONAL: No fever, weight loss AAOx2  EYES: No eye pain, visual disturbances, or discharge  ENMT: Difficulty hearing, No tinnitus, vertigo; No sinus or throat pain  NECK: No pain or stiffness  BREASTS: No pain, masses, or nipple discharge  RESPIRATORY: No cough, wheezing, chills or hemoptysis; No shortness of breath  CARDIOVASCULAR: No chest pain, palpitations, dizziness, or leg swelling  GASTROINTESTINAL: No abdominal or epigastric pain. No nausea, vomiting, or hematemesis; No diarrhea or constipation. No melena or hematochezia.  GENITOURINARY: No dysuria, frequency, hematuria, or incontinence  NEUROLOGICAL: No headaches, loss of strength, numbness, or tremors  SKIN: No itching, burning, rashes. Abrasion on right elbow.  MUSCULOSKELETAL: Positive for pain in her right elbow and right hip. No swelling; No muscle, back pain  PSYCHIATRIC: No depression, anxiety, mood swings, or difficulty sleeping  HEME/LYMPH: No bleeding gums  ALLERY AND IMMUNOLOGIC: No hives or eczema  FAMILY HISTORY:  Patient unable to provide family medical history      T(C): 36.7 (03-14-22 @ 05:10), Max: 36.7 (03-14-22 @ 05:10)  HR: 80 (03-14-22 @ 05:10) (69 - 103)  BP: 100/50 (03-14-22 @ 05:10) (100/50 - 155/69)  RR: 18 (03-14-22 @ 05:10) (18 - 19)  SpO2: 96% (03-13-22 @ 12:30) (95% - 96%)  Wt(kg): --Vital Signs Last 24 Hrs  T(C): 36.7 (14 Mar 2022 05:10), Max: 36.7 (14 Mar 2022 05:10)  T(F): 98.1 (14 Mar 2022 05:10), Max: 98.1 (14 Mar 2022 05:10)  HR: 80 (14 Mar 2022 05:10) (69 - 103)  BP: 100/50 (14 Mar 2022 05:10) (100/50 - 155/69)  BP(mean): 88 (13 Mar 2022 12:45) (88 - 105)  RR: 18 (14 Mar 2022 05:10) (18 - 19)  SpO2: 96% (13 Mar 2022 12:30) (95% - 96%)  No Known Allergies      PHYSICAL EXAM:  GENERAL: NAD, elderly woman lying comfortably in bed  HEAD:  Atraumatic, Normocephalic  EYES: EOMI, PERRLA, conjunctiva and sclera clear  ENMT: No tonsillar erythema, exudates, or enlargement; Moist mucous membranes, No lesions  NECK: Supple, No JVD, Normal thyroid  NERVOUS SYSTEM:  Alert & Oriented X2, Good concentration; Motor Strength 5/5 B/L upper and lower extremities;  CHEST/LUNG: Clear to percussion bilaterally; No rales, rhonchi, wheezing, or rubs  HEART: Regular rate and rhythm; No murmurs, rubs, or gallops  ABDOMEN: Soft, Nontender, Nondistended; Bowel sounds present  EXTREMITIES:  2+ Peripheral Pulses, No clubbing, cyanosis, or edema  LYMPH: No lymphadenopathy noted  SKIN: No rashes. Abrasion on right elbow    Consultant(s) Notes Reviewed:  [x ] YES  [ ] NO  Care Discussed with Consultants/Other Providers [ x] YES  [ ] NO    LABS:  Complete Blood Count + Automated Diff in AM (03.14.22 @ 04:30)   WBC Count: 6.29 K/uL   RBC Count: 3.37 M/uL   Hemoglobin: 9.8 g/dL   Hematocrit: 30.7 %   Platelet Count - Automated: 396 K/uL       Troponin T, Serum (03.13.22 @ 23:30)   Troponin T, Serum: 0.09: Critical value: ng/mL   Troponin T, Serum (03.13.22 @ 21:44)   Troponin T, Serum: 0.06: Critical value: ng/mL     RADIOLOGY & ADDITIONAL TESTS:  < from: CT Chest Abdomen and Pelvis w/ IV Cont (03.13.22 @ 13:51) >    Impresssion: Age-indeterminate moderate compression deformities at L5, T10, T9, T8,   T6, T5, and T4. Age-indeterminate mild compression deformities at T11, T3   and T11.    Status post multilevel kyphoplasty at T12 through L4.      < from: Xray Femur 2 Views, Right (03.13.22 @ 13:29) >  IMPRESSION:  No evidence of acute fracture or dislocation.        < from: Xray Elbow AP + Lateral, Right (03.13.22 @ 13:29) >  impression: No definite evidence of acute fracture or dislocation. The   soft tissue abnormality at the posterior elbow; correlate with physical   examination. No definite elbow joint effusion.        < from: Xray Pelvis AP only (03.13.22 @ 13:28) >  No acute fracture or dislocation.    Status post interval right hip arthroplasty without definite evidence of   hardware consultation. Please note that the caudal extent of the right   femoral hardware is not imaged.      < from: Xray Chest 1 View AP/PA (03.13.22 @ 13:28) >  Impression:  There is no evidence of focal consolidation, pleural effusion or   pneumothorax.        < from: CT Cervical Spine No Cont (03.13.22 @ 13:23) >  CT head: No evidence of acute transcortical infarct, acute intracranial   hemorrhage or mass effect.    CT cervical spine: No acute fracture or traumatic subluxation.          Imaging Personally Reviewed:  [ ] YES  [ ] NO  enoxaparin Injectable 40 milliGRAM(s) SubCutaneous every 24 hours  hydrochlorothiazide 25 milliGRAM(s) Oral daily  metoprolol succinate ER 25 milliGRAM(s) Oral daily  oxyCODONE    IR 5 milliGRAM(s) Oral every 6 hours PRN  potassium chloride    Tablet ER 20 milliEquivalent(s) Oral once  senna 2 Tablet(s) Oral at bedtime  valsartan 160 milliGRAM(s) Oral daily      HEALTH ISSUES - PROBLEM Dx:

## 2022-03-15 VITALS
OXYGEN SATURATION: 96 % | SYSTOLIC BLOOD PRESSURE: 160 MMHG | DIASTOLIC BLOOD PRESSURE: 71 MMHG | RESPIRATION RATE: 18 BRPM | TEMPERATURE: 98 F | HEART RATE: 75 BPM

## 2022-03-15 LAB — TROPONIN T SERPL-MCNC: 0.07 NG/ML — CRITICAL HIGH

## 2022-03-15 PROCEDURE — 99239 HOSP IP/OBS DSCHRG MGMT >30: CPT

## 2022-03-15 PROCEDURE — 93306 TTE W/DOPPLER COMPLETE: CPT | Mod: 26

## 2022-03-15 RX ORDER — OXYCODONE HYDROCHLORIDE 5 MG/1
1 TABLET ORAL
Qty: 0 | Refills: 0 | DISCHARGE

## 2022-03-15 RX ADMIN — Medication 25 MILLIGRAM(S): at 05:43

## 2022-03-15 RX ADMIN — Medication 25 MILLIGRAM(S): at 05:44

## 2022-03-15 RX ADMIN — VALSARTAN 160 MILLIGRAM(S): 80 TABLET ORAL at 05:44

## 2022-03-15 NOTE — PHYSICAL THERAPY INITIAL EVALUATION ADULT - GENERAL OBSERVATIONS, REHAB EVAL
+Confusion. Patient unable to provide accurate social history. +roll belt, +hearing amplifier. PT IE 8:30-9am. Patient left in supine in NAD. +call bell, +bed alarm, +telemetry, +prima fit.

## 2022-03-15 NOTE — DISCHARGE NOTE PROVIDER - CARE PROVIDERS DIRECT ADDRESSES
,DirectAddress_Unknown,[email or web address], [email or web address]

## 2022-03-15 NOTE — DISCHARGE NOTE PROVIDER - PROVIDER TOKENS
FREE:[LAST:[Silvio],FIRST:[Margoth],PHONE:[(   )    -],FAX:[(   )    -]],PROVIDER:[TOKEN:[[id number]]]

## 2022-03-15 NOTE — PROGRESS NOTE ADULT - SUBJECTIVE AND OBJECTIVE BOX
YOMI LOREE  89y  Female      Patient is a 89y old Female with pmh htn, hld, femur neck fracture s/p recent hip replacement surgery who presents following an unwitnessed fall at [facility]. She did not remember the events surrounding her fall, but stated she fell on her right side and was experiencing pain in her right hip and elbow. She was an unreliable historian, but stated that she thinks her surgery was done about 1 month ago.     Today, she mentions that her hip and elbow pain is unchanged from prior. Her right hip incision site was clean dry and intact. Her right lower extremity has 1+ edema, and her venous duplex performed 3/14/22 showed no evidence of DVT bilaterally. She denied any headache, vision changes, chest pain, sob, NVD. (14 Mar 2022 10:15)      INTERVAL HPI/OVERNIGHT EVENTS: No overnight events      REVIEW OF SYSTEMS:  CONSTITUTIONAL: No fever, weight loss AAOx2  EYES: No eye pain, visual disturbances, or discharge  ENMT: Difficulty hearing, No tinnitus, vertigo; No sinus or throat pain  NECK: No pain or stiffness  BREASTS: No pain, masses, or nipple discharge  RESPIRATORY: No cough, wheezing, chills or hemoptysis; No shortness of breath  CARDIOVASCULAR: No chest pain, palpitations, dizziness, or leg swelling  GASTROINTESTINAL: No abdominal or epigastric pain. No nausea, vomiting, or hematemesis; No diarrhea or constipation. No melena or hematochezia.  GENITOURINARY: No dysuria, frequency, hematuria, or incontinence  NEUROLOGICAL: No headaches, loss of strength, numbness, or tremors  SKIN: No itching, burning, rashes. Abrasion on right elbow.  MUSCULOSKELETAL: Positive for pain in her right elbow and right hip. No swelling; No muscle, back pain  PSYCHIATRIC: No depression, anxiety, mood swings, or difficulty sleeping  HEME/LYMPH: No bleeding gums  ALLERY AND IMMUNOLOGIC: No hives or eczema  FAMILY HISTORY:  Patient unable to provide family medical history      T(C): 36.7 (03-14-22 @ 05:10), Max: 36.7 (03-14-22 @ 05:10)  HR: 80 (03-14-22 @ 05:10) (69 - 103)  BP: 100/50 (03-14-22 @ 05:10) (100/50 - 155/69)  RR: 18 (03-14-22 @ 05:10) (18 - 19)  SpO2: 96% (03-13-22 @ 12:30) (95% - 96%)  Wt(kg): --Vital Signs Last 24 Hrs  T(C): 36.7 (14 Mar 2022 05:10), Max: 36.7 (14 Mar 2022 05:10)  T(F): 98.1 (14 Mar 2022 05:10), Max: 98.1 (14 Mar 2022 05:10)  HR: 80 (14 Mar 2022 05:10) (69 - 103)  BP: 100/50 (14 Mar 2022 05:10) (100/50 - 155/69)  BP(mean): 88 (13 Mar 2022 12:45) (88 - 105)  RR: 18 (14 Mar 2022 05:10) (18 - 19)  SpO2: 96% (13 Mar 2022 12:30) (95% - 96%)  No Known Allergies      PHYSICAL EXAM:  GENERAL: NAD, elderly woman lying comfortably in bed  HEAD:  Atraumatic, Normocephalic  EYES: EOMI, PERRLA, conjunctiva and sclera clear  ENMT: No tonsillar erythema, exudates, or enlargement; Moist mucous membranes, No lesions  NECK: Supple, No JVD, Normal thyroid  NERVOUS SYSTEM:  Alert & Oriented X2, Good concentration; Motor Strength 5/5 B/L upper and lower extremities;  CHEST/LUNG: Clear to percussion bilaterally; No rales, rhonchi, wheezing, or rubs  HEART: Regular rate and rhythm; No murmurs, rubs, or gallops  ABDOMEN: Soft, Nontender, Nondistended; Bowel sounds present  EXTREMITIES:  2+ Peripheral Pulses, No clubbing, cyanosis, or edema  LYMPH: No lymphadenopathy noted  SKIN: No rashes. Abrasion on right elbow    Consultant(s) Notes Reviewed:  [x ] YES  [ ] NO  Care Discussed with Consultants/Other Providers [ x] YES  [ ] NO    LABS:  Complete Blood Count + Automated Diff in AM (03.14.22 @ 04:30)   WBC Count: 6.29 K/uL   RBC Count: 3.37 M/uL   Hemoglobin: 9.8 g/dL   Hematocrit: 30.7 %   Platelet Count - Automated: 396 K/uL       Troponin T, Serum (03.13.22 @ 23:30)   Troponin T, Serum: 0.09: Critical value: ng/mL   Troponin T, Serum (03.13.22 @ 21:44)   Troponin T, Serum: 0.06: Critical value: ng/mL     RADIOLOGY & ADDITIONAL TESTS:  < from: CT Chest Abdomen and Pelvis w/ IV Cont (03.13.22 @ 13:51) >    Impresssion: Age-indeterminate moderate compression deformities at L5, T10, T9, T8,   T6, T5, and T4. Age-indeterminate mild compression deformities at T11, T3   and T11.    Status post multilevel kyphoplasty at T12 through L4.      < from: Xray Femur 2 Views, Right (03.13.22 @ 13:29) >  IMPRESSION:  No evidence of acute fracture or dislocation.        < from: Xray Elbow AP + Lateral, Right (03.13.22 @ 13:29) >  impression: No definite evidence of acute fracture or dislocation. The   soft tissue abnormality at the posterior elbow; correlate with physical   examination. No definite elbow joint effusion.        < from: Xray Pelvis AP only (03.13.22 @ 13:28) >  No acute fracture or dislocation.    Status post interval right hip arthroplasty without definite evidence of   hardware consultation. Please note that the caudal extent of the right   femoral hardware is not imaged.      < from: Xray Chest 1 View AP/PA (03.13.22 @ 13:28) >  Impression:  There is no evidence of focal consolidation, pleural effusion or   pneumothorax.        < from: CT Cervical Spine No Cont (03.13.22 @ 13:23) >  CT head: No evidence of acute transcortical infarct, acute intracranial   hemorrhage or mass effect.    CT cervical spine: No acute fracture or traumatic subluxation.          Imaging Personally Reviewed:  [ ] YES  [ ] NO  enoxaparin Injectable 40 milliGRAM(s) SubCutaneous every 24 hours  hydrochlorothiazide 25 milliGRAM(s) Oral daily  metoprolol succinate ER 25 milliGRAM(s) Oral daily  oxyCODONE    IR 5 milliGRAM(s) Oral every 6 hours PRN  potassium chloride    Tablet ER 20 milliEquivalent(s) Oral once  senna 2 Tablet(s) Oral at bedtime  valsartan 160 milliGRAM(s) Oral daily LOREE CELAYA  89y  Female      Patient is a 89y old Female with pmh htn, hld, femur neck fracture s/p recent hip replacement surgery who presents following an unwitnessed fall at [facility]. She did not remember the events surrounding her fall, but stated she fell on her right side and was experiencing pain in her right hip and elbow. She was an unreliable historian, but stated that she thinks her surgery was done about 1 month ago.     Today, she mentions that her hip and elbow pain is unchanged from prior. Her right hip incision site was clean dry and intact. Her right lower extremity has 1+ edema, and her venous duplex performed 3/14/22 showed no evidence of DVT bilaterally. She denied any headache, vision changes, chest pain, sob, NVD. (14 Mar 2022 10:15)      INTERVAL HPI/OVERNIGHT EVENTS: No overnight events, hemodynamically stable      REVIEW OF SYSTEMS:  CONSTITUTIONAL: No fever, weight loss AAOx2  EYES: No eye pain, visual disturbances, or discharge  ENMT: Difficulty hearing, No tinnitus, vertigo; No sinus or throat pain  NECK: No pain or stiffness  BREASTS: No pain, masses, or nipple discharge  RESPIRATORY: No cough, wheezing, chills or hemoptysis; No shortness of breath  CARDIOVASCULAR: No chest pain, palpitations, dizziness, or leg swelling  GASTROINTESTINAL: No abdominal or epigastric pain. No nausea, vomiting, or hematemesis; No diarrhea or constipation. No melena or hematochezia.  GENITOURINARY: No dysuria, frequency, hematuria, or incontinence  NEUROLOGICAL: No headaches, loss of strength, numbness, or tremors  SKIN: No itching, burning, rashes. Abrasion on right elbow.  MUSCULOSKELETAL: Positive for pain in her right elbow and right hip. No swelling; No muscle, back pain  PSYCHIATRIC: No depression, anxiety, mood swings, or difficulty sleeping  HEME/LYMPH: No bleeding gums  ALLERY AND IMMUNOLOGIC: No hives or eczema  FAMILY HISTORY:  Patient unable to provide family medical history      T(C): 36.2 (15 Mar 2022 05:43), Max: 36.3 (14 Mar 2022 14:20)  T(F): 97.1 (15 Mar 2022 05:43), Max: 97.3 (14 Mar 2022 14:20)  HR: 77 (15 Mar 2022 05:43) (77 - 99)  BP: 167/77 (15 Mar 2022 05:43) (103/50 - 167/77)  BP(mean): --  RR: 18 (14 Mar 2022 20:40) (18 - 18)  SpO2: 96% (15 Mar 2022 05:43) (96% - 96%)  No Known Allergies      PHYSICAL EXAM:  GENERAL: NAD, elderly woman lying comfortably in bed  HEAD:  Atraumatic, Normocephalic  EYES: EOMI, PERRLA, conjunctiva and sclera clear  ENMT: No tonsillar erythema, exudates, or enlargement; Moist mucous membranes, No lesions  NECK: Supple, No JVD, Normal thyroid  NERVOUS SYSTEM:  Alert & Oriented X2, Good concentration; Motor Strength 5/5 B/L upper and lower extremities;  CHEST/LUNG: Clear to percussion bilaterally; No rales, rhonchi, wheezing, or rubs  HEART: Regular rate and rhythm; No murmurs, rubs, or gallops  ABDOMEN: Soft, Nontender, Nondistended; Bowel sounds present  EXTREMITIES:  2+ Peripheral Pulses, No clubbing, cyanosis, or edema  LYMPH: No lymphadenopathy noted  SKIN: No rashes. Abrasion on right elbow    Consultant(s) Notes Reviewed:  [x ] YES  [ ] NO  Care Discussed with Consultants/Other Providers [ x] YES  [ ] NO    LABS:  Complete Blood Count + Automated Diff in AM (03.14.22 @ 04:30)   WBC Count: 6.29 K/uL   RBC Count: 3.37 M/uL   Hemoglobin: 9.8 g/dL   Hematocrit: 30.7 %   Platelet Count - Automated: 396 K/uL       Troponin T, Serum (03.13.22 @ 23:30)   Troponin T, Serum: 0.09: Critical value: ng/mL   Troponin T, Serum (03.13.22 @ 21:44)   Troponin T, Serum: 0.06: Critical value: ng/mL     RADIOLOGY & ADDITIONAL TESTS:  < from: CT Chest Abdomen and Pelvis w/ IV Cont (03.13.22 @ 13:51) >    Impresssion: Age-indeterminate moderate compression deformities at L5, T10, T9, T8,   T6, T5, and T4. Age-indeterminate mild compression deformities at T11, T3   and T11.    Status post multilevel kyphoplasty at T12 through L4.      < from: Xray Femur 2 Views, Right (03.13.22 @ 13:29) >  IMPRESSION:  No evidence of acute fracture or dislocation.        < from: Xray Elbow AP + Lateral, Right (03.13.22 @ 13:29) >  impression: No definite evidence of acute fracture or dislocation. The   soft tissue abnormality at the posterior elbow; correlate with physical   examination. No definite elbow joint effusion.        < from: Xray Pelvis AP only (03.13.22 @ 13:28) >  No acute fracture or dislocation.    Status post interval right hip arthroplasty without definite evidence of   hardware consultation. Please note that the caudal extent of the right   femoral hardware is not imaged.      < from: Xray Chest 1 View AP/PA (03.13.22 @ 13:28) >  Impression:  There is no evidence of focal consolidation, pleural effusion or   pneumothorax.        < from: CT Cervical Spine No Cont (03.13.22 @ 13:23) >  CT head: No evidence of acute transcortical infarct, acute intracranial   hemorrhage or mass effect.    CT cervical spine: No acute fracture or traumatic subluxation.          Imaging Personally Reviewed:  [ ] YES  [ ] NO  enoxaparin Injectable 40 milliGRAM(s) SubCutaneous every 24 hours  hydrochlorothiazide 25 milliGRAM(s) Oral daily  metoprolol succinate ER 25 milliGRAM(s) Oral daily  oxyCODONE    IR 5 milliGRAM(s) Oral every 6 hours PRN  potassium chloride    Tablet ER 20 milliEquivalent(s) Oral once  senna 2 Tablet(s) Oral at bedtime  valsartan 160 milliGRAM(s) Oral daily LOREE CELAYA  89y  Female      Patient is a 89y old Female with pmh htn, hld, femur neck fracture s/p recent hip replacement surgery who presents following an unwitnessed fall at [facility]. She did not remember the events surrounding her fall, but stated she fell on her right side and was experiencing pain in her right hip and elbow. She was an unreliable historian, but stated that she thinks her surgery was done about 1 month ago.     Today, she mentions that her hip and elbow pain is unchanged from prior. Her right hip incision site was clean dry and intact. Her right lower extremity has 1+ edema, and her venous duplex performed 3/14/22 showed no evidence of DVT bilaterally. She denied any headache, vision changes, chest pain, sob, NVD. (14 Mar 2022 10:15)      INTERVAL HPI/OVERNIGHT EVENTS: No overnight events, hemodynamically stable      REVIEW OF SYSTEMS:  CONSTITUTIONAL: No fever, weight loss AAOx2  EYES: No eye pain, visual disturbances, or discharge  ENMT: Difficulty hearing, No tinnitus, vertigo; No sinus or throat pain  NECK: No pain or stiffness  BREASTS: No pain, masses, or nipple discharge  RESPIRATORY: No cough, wheezing, chills or hemoptysis; No shortness of breath  CARDIOVASCULAR: No chest pain, palpitations, dizziness, or leg swelling  GASTROINTESTINAL: No abdominal or epigastric pain. No nausea, vomiting, or hematemesis; No diarrhea or constipation. No melena or hematochezia.  GENITOURINARY: No dysuria, frequency, hematuria, or incontinence  NEUROLOGICAL: No headaches, loss of strength, numbness, or tremors  SKIN: No itching, burning, rashes. Abrasion on right elbow.  MUSCULOSKELETAL: Positive for pain in her right elbow and right hip. No swelling; No muscle, back pain  PSYCHIATRIC: No depression, anxiety, mood swings, or difficulty sleeping  HEME/LYMPH: No bleeding gums  ALLERY AND IMMUNOLOGIC: No hives or eczema  FAMILY HISTORY:  Patient unable to provide family medical history      T(C): 36.2 (15 Mar 2022 05:43), Max: 36.3 (14 Mar 2022 14:20)  T(F): 97.1 (15 Mar 2022 05:43), Max: 97.3 (14 Mar 2022 14:20)  HR: 77 (15 Mar 2022 05:43) (77 - 99)  BP: 167/77 (15 Mar 2022 05:43) (103/50 - 167/77)  BP(mean): --  RR: 18 (14 Mar 2022 20:40) (18 - 18)  SpO2: 96% (15 Mar 2022 05:43) (96% - 96%)  No Known Allergies      PHYSICAL EXAM:  GENERAL: NAD, elderly woman lying comfortably in bed  HEAD:  Atraumatic, Normocephalic  EYES: EOMI, PERRLA, conjunctiva and sclera clear  ENMT: No tonsillar erythema, exudates, or enlargement; Moist mucous membranes, No lesions  NECK: Supple, No JVD, Normal thyroid  NERVOUS SYSTEM:  Alert & Oriented X2, Good concentration;  CHEST/LUNG: Clear to percussion bilaterally; No rales, rhonchi, wheezing, or rubs  HEART: Regular rate and rhythm; No murmurs, rubs, or gallops  ABDOMEN: Soft, Nontender, Nondistended; Bowel sounds present  EXTREMITIES:  2+ Peripheral Pulses, No clubbing, cyanosis, or edema  LYMPH: No lymphadenopathy noted  SKIN: No rashes. Abrasion on right elbow    Consultant(s) Notes Reviewed:  [x ] YES  [ ] NO  Care Discussed with Consultants/Other Providers [ x] YES  [ ] NO    LABS:      Complete Blood Count + Automated Diff in AM (03.14.22 @ 04:30)   WBC Count: 6.29 K/uL   RBC Count: 3.37 M/uL   Hemoglobin: 9.8 g/dL   Hematocrit: 30.7 %   Platelet Count - Automated: 396 K/uL       Troponin T, Serum (03.13.22 @ 23:30)   Troponin T, Serum: 0.09: Critical value: ng/mL   Troponin T, Serum (03.13.22 @ 21:44)   Troponin T, Serum: 0.06: Critical value: ng/mL     RADIOLOGY & ADDITIONAL TESTS:  < from: CT Chest Abdomen and Pelvis w/ IV Cont (03.13.22 @ 13:51) >    Impresssion: Age-indeterminate moderate compression deformities at L5, T10, T9, T8,   T6, T5, and T4. Age-indeterminate mild compression deformities at T11, T3   and T11.    Status post multilevel kyphoplasty at T12 through L4.      < from: Xray Femur 2 Views, Right (03.13.22 @ 13:29) >  IMPRESSION:  No evidence of acute fracture or dislocation.        < from: Xray Elbow AP + Lateral, Right (03.13.22 @ 13:29) >  impression: No definite evidence of acute fracture or dislocation. The   soft tissue abnormality at the posterior elbow; correlate with physical   examination. No definite elbow joint effusion.        < from: Xray Pelvis AP only (03.13.22 @ 13:28) >  No acute fracture or dislocation.    Status post interval right hip arthroplasty without definite evidence of   hardware consultation. Please note that the caudal extent of the right   femoral hardware is not imaged.      < from: Xray Chest 1 View AP/PA (03.13.22 @ 13:28) >  Impression:  There is no evidence of focal consolidation, pleural effusion or   pneumothorax.        < from: CT Cervical Spine No Cont (03.13.22 @ 13:23) >  CT head: No evidence of acute transcortical infarct, acute intracranial   hemorrhage or mass effect.    CT cervical spine: No acute fracture or traumatic subluxation.          Imaging Personally Reviewed:  [ ] YES  [ ] NO  enoxaparin Injectable 40 milliGRAM(s) SubCutaneous every 24 hours  hydrochlorothiazide 25 milliGRAM(s) Oral daily  metoprolol succinate ER 25 milliGRAM(s) Oral daily  oxyCODONE    IR 5 milliGRAM(s) Oral every 6 hours PRN  potassium chloride    Tablet ER 20 milliEquivalent(s) Oral once  senna 2 Tablet(s) Oral at bedtime  valsartan 160 milliGRAM(s) Oral daily

## 2022-03-15 NOTE — DISCHARGE NOTE PROVIDER - NSDCMRMEDTOKEN_GEN_ALL_CORE_FT
Colace 100 mg oral capsule: 1 cap(s) orally 2 times a day  Lovenox 40 mg/0.4 mL injectable solution: 40 milligram(s) injectable once a day  Metoprolol Succinate ER 25 mg oral tablet, extended release: 1 tab(s) orally once a day  oxyCODONE 5 mg oral tablet: 1 tab(s) orally every 6 hours, As Needed - for moderate pain  valsartan-hydrochlorothiazide 160 mg-25 mg oral tablet: 1 tab(s) orally once a day   Colace 100 mg oral capsule: 1 cap(s) orally 2 times a day  Lovenox 40 mg/0.4 mL injectable solution: 40 milligram(s) injectable once a day  Metoprolol Succinate ER 25 mg oral tablet, extended release: 1 tab(s) orally once a day  valsartan-hydrochlorothiazide 160 mg-25 mg oral tablet: 1 tab(s) orally once a day

## 2022-03-15 NOTE — DISCHARGE NOTE PROVIDER - HOSPITAL COURSE
Patient is a 89y old Female with pmh htn, hld, femur neck fracture s/p recent hip replacement surgery who presents following an unwitnessed fall at [facility]. She did not remember the events surrounding her fall, but her daughter stated she walked to the bathroom unsupervised and fell on her right side. She was experiencing pain in her right hip and elbow. In the ED, she was hemodynamically stable. Per pt's daughter, she had hip surgery for a fracture 2 weeks prior to admission due to a fall while walking upstairs. Incision site on right hip was clean dry and intact. There was no evidence of acute traumatic injuries on trauma workup imaging.     She had elevated troponins likely due to type II demand ischemia without any ST changes on EKG. She was likely dehydrated and hemoconcentrated since her hgb, cell lines, and serum creatinine decreased after receiving IV fluids.     Her right lower extremity has 1+ edema, and her venous duplex performed 3/14/22 showed no evidence of DVT bilaterally. Patient is a 89y old Female with pmh htn, hld, femur neck fracture s/p recent hip replacement surgery who presents following an unwitnessed fall at [facility]. She did not remember the events surrounding her fall, but her daughter stated she walked to the bathroom unsupervised and fell on her right side. She was experiencing pain in her right hip and elbow. In the ED, she was hemodynamically stable. Per pt's daughter, she had hip surgery for a fracture 2 weeks prior to admission due to a fall while walking upstairs. Incision site on right hip was clean dry and intact. There was no evidence of acute traumatic injuries on trauma workup imaging.     She had elevated troponins likely due to type II demand ischemia without any ST changes on EKG. She was likely dehydrated and hemoconcentrated since her hgb, cell lines, and serum creatinine decreased after receiving IV fluids.    Her right lower extremity has 1+ edema, and her venous duplex performed 3/14/22 showed no evidence of DVT bilaterally.  TTE showed no significant valvular abnormalities. Right atrium echo density was noted; however correlation with CT chest negative for thrombus; density is likely an anatomic variant. Patient is stable for discharge

## 2022-03-15 NOTE — DISCHARGE NOTE PROVIDER - NSDCFUADDAPPT_GEN_ALL_CORE_FT
Please follow up with your primary care doctor, Dr. Anguiano. If you do not have a primary care doctor, you can follow up with Dr. Sommer; information provided on discharge paperwork.

## 2022-03-15 NOTE — PROGRESS NOTE ADULT - ASSESSMENT
Patient is a 89y old Female with pmh htn, hld, femur neck fracture s/p recent hip replacement surgery who presents following an unwitnessed fall at [facility]. She did not remember the events surrounding her fall, but stated she fell on her right side and was experiencing pain in her right hip and elbow.    #Mechanical fall vs. syncope  - No acute traumatic injuries identified on imaging  - Seen by trauma and orthopedics  - Review TTE performed 3/15/22   - Continue with oxycodone 5mg q6hr prn for pain    #Anemia hgb 9.8  - Continue to trend hgb    # Elevated Trop   - most likely type II secondary to DIMAS  - Hold valsartan per cardiology note  - Trop 0.09 --> 0.06  - EKG: NSR with no acute ST changes   - CK wnl    #RLE edema  - Venous duplex on 3/14/22 showed no evidence of LE DVT bilaterally    # HTN  - c/w toprol  - hold HCTZ, valsartan for now   - if bp elevated start amlodipine     # DVT ppx  - c/w heparin     # Full code   Patient is a 89y old Female with pmh htn, hld, femur neck fracture s/p recent hip replacement surgery who presents following an unwitnessed fall at [facility]. She did not remember the events surrounding her fall, but stated she fell on her right side and was experiencing pain in her right hip and elbow.    #Mechanical fall vs. syncope  - No acute traumatic injuries identified on imaging  - Seen by trauma and orthopedics  - Review TTE performed 3/15/22   - Continue with oxycodone 5mg q6hr prn for pain    #Anemia hgb 9.8  - Continue to trend hgb    # Elevated Trop   - most likely type II secondary to DIMAS  - Trop 0.09 --> 0.06  - EKG: NSR with no acute ST changes   - CK wnl    #RLE edema  - Venous duplex on 3/14/22 showed no evidence of LE DVT bilaterally    # HTN  - c/w toprol  -HCTZ and valsartan have been restarted    # DVT ppx  - c/w heparin     # Full code

## 2022-03-15 NOTE — DISCHARGE NOTE PROVIDER - CARE PROVIDER_API CALL
Margoth Anguiano  Phone: (   )    -  Fax: (   )    -  Follow Up Time:     Keyur Sommer (DO)  Medicine  [address] [place] [address], [address] [place] [address]  Phone: [phone]  Fax: [phone]  Follow Up Time:

## 2022-03-15 NOTE — DISCHARGE NOTE NURSING/CASE MANAGEMENT/SOCIAL WORK - PATIENT PORTAL LINK FT
You can access the FollowMyHealth Patient Portal offered by [facility] by registering at the following website: [email or web address]. By joining [facility]’s FollowMyHealth portal, you will also be able to view your health information using other applications (apps) compatible with our system.

## 2022-03-15 NOTE — PHYSICAL THERAPY INITIAL EVALUATION ADULT - GAIT DISTANCE, PT EVAL
Unsafe to walk further due to confusion. RN in agreement to return patient to bed./bed to chair/chair to bed

## 2022-03-15 NOTE — DISCHARGE NOTE PROVIDER - NSDCCPCAREPLAN_GEN_ALL_CORE_FT
PRINCIPAL DISCHARGE DIAGNOSIS  Diagnosis: Fall  Assessment and Plan of Treatment: This was likely mechanical as pt got up by herself. No telemetry events or findings on ECHO. Continue with physical therapy      SECONDARY DISCHARGE DIAGNOSES  Diagnosis: Elevated troponin level  Assessment and Plan of Treatment: This was likely due to DIMAS. Cardiac work up negative    Diagnosis: DIMAS (acute kidney injury)  Assessment and Plan of Treatment: Resolved

## 2022-03-15 NOTE — DISCHARGE NOTE NURSING/CASE MANAGEMENT/SOCIAL WORK - NSDCPEFALRISK_GEN_ALL_CORE
For information on Fall & Injury Prevention, visit: [email or web address] OR  [email or web address] OR  https://www.cdc.gov/steadi/patient.html

## 2022-03-17 DIAGNOSIS — R77.8 OTHER SPECIFIED ABNORMALITIES OF PLASMA PROTEINS: ICD-10-CM

## 2022-03-17 DIAGNOSIS — I21.A1 MYOCARDIAL INFARCTION TYPE 2: ICD-10-CM
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
## 2022-03-17 DIAGNOSIS — I12.9 HYPERTENSIVE CHRONIC KIDNEY DISEASE WITH STAGE 1 THROUGH STAGE 4 CHRONIC KIDNEY DISEASE, OR UNSPECIFIED CHRONIC KIDNEY DISEASE: ICD-10-CM

## 2022-03-17 DIAGNOSIS — Z96.641 PRESENCE OF RIGHT ARTIFICIAL HIP JOINT: ICD-10-CM

## 2022-03-17 DIAGNOSIS — N17.9 ACUTE KIDNEY FAILURE, UNSPECIFIED: ICD-10-CM

## 2022-03-17 DIAGNOSIS — D64.9 ANEMIA, UNSPECIFIED: ICD-10-CM

## 2022-03-17 DIAGNOSIS — S51.011A LACERATION WITHOUT FOREIGN BODY OF RIGHT ELBOW, INITIAL ENCOUNTER: ICD-10-CM

## 2022-03-17 DIAGNOSIS — N18.30 CHRONIC KIDNEY DISEASE, STAGE 3 UNSPECIFIED: ICD-10-CM

## 2022-03-17 DIAGNOSIS — R60.0 LOCALIZED EDEMA: ICD-10-CM

## 2022-03-17 DIAGNOSIS — E86.0 DEHYDRATION: ICD-10-CM
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
## 2022-08-07 ENCOUNTER — INPATIENT (INPATIENT)
Facility: HOSPITAL | Age: 87
LOS: 13 days | Discharge: HOME HEALTH | End: 2022-08-21
Attending: HOSPITALIST | Admitting: HOSPITALIST

## 2022-08-07 VITALS
HEIGHT: 60 IN | HEART RATE: 82 BPM | OXYGEN SATURATION: 98 % | TEMPERATURE: 98 F | SYSTOLIC BLOOD PRESSURE: 127 MMHG | DIASTOLIC BLOOD PRESSURE: 60 MMHG | RESPIRATION RATE: 18 BRPM

## 2022-08-07 DIAGNOSIS — Z98.890 OTHER SPECIFIED POSTPROCEDURAL STATES: Chronic | ICD-10-CM

## 2022-08-07 LAB
ALBUMIN SERPL ELPH-MCNC: 3.6 G/DL — SIGNIFICANT CHANGE UP (ref 3.5–5.2)
ALP SERPL-CCNC: 57 U/L — SIGNIFICANT CHANGE UP (ref 30–115)
ALT FLD-CCNC: 7 U/L — SIGNIFICANT CHANGE UP (ref 0–41)
ANION GAP SERPL CALC-SCNC: 13 MMOL/L — SIGNIFICANT CHANGE UP (ref 7–14)
APPEARANCE UR: ABNORMAL
APPEARANCE UR: ABNORMAL
AST SERPL-CCNC: 36 U/L — SIGNIFICANT CHANGE UP (ref 0–41)
BACTERIA # UR AUTO: NEGATIVE — SIGNIFICANT CHANGE UP
BACTERIA # UR AUTO: NEGATIVE — SIGNIFICANT CHANGE UP
BASE EXCESS BLDV CALC-SCNC: 3.4 MMOL/L — HIGH (ref -2–3)
BASOPHILS # BLD AUTO: 0.01 K/UL — SIGNIFICANT CHANGE UP (ref 0–0.2)
BASOPHILS NFR BLD AUTO: 0.2 % — SIGNIFICANT CHANGE UP (ref 0–1)
BILIRUB SERPL-MCNC: 0.4 MG/DL — SIGNIFICANT CHANGE UP (ref 0.2–1.2)
BILIRUB UR-MCNC: NEGATIVE — SIGNIFICANT CHANGE UP
BILIRUB UR-MCNC: NEGATIVE — SIGNIFICANT CHANGE UP
BUN SERPL-MCNC: 24 MG/DL — HIGH (ref 10–20)
CA-I SERPL-SCNC: 1.15 MMOL/L — SIGNIFICANT CHANGE UP (ref 1.15–1.33)
CALCIUM SERPL-MCNC: 8.9 MG/DL — SIGNIFICANT CHANGE UP (ref 8.5–10.1)
CHLORIDE SERPL-SCNC: 87 MMOL/L — LOW (ref 98–110)
CO2 SERPL-SCNC: 23 MMOL/L — SIGNIFICANT CHANGE UP (ref 17–32)
COLOR SPEC: ABNORMAL
COLOR SPEC: YELLOW — SIGNIFICANT CHANGE UP
CREAT SERPL-MCNC: 1.3 MG/DL — SIGNIFICANT CHANGE UP (ref 0.7–1.5)
DIFF PNL FLD: ABNORMAL
DIFF PNL FLD: ABNORMAL
EGFR: 38 ML/MIN/1.73M2 — LOW
EOSINOPHIL # BLD AUTO: 0 K/UL — SIGNIFICANT CHANGE UP (ref 0–0.7)
EOSINOPHIL NFR BLD AUTO: 0 % — SIGNIFICANT CHANGE UP (ref 0–8)
EPI CELLS # UR: 2 /HPF — SIGNIFICANT CHANGE UP (ref 0–5)
EPI CELLS # UR: 2 /HPF — SIGNIFICANT CHANGE UP (ref 0–5)
GAS PNL BLDV: 122 MMOL/L — LOW (ref 136–145)
GAS PNL BLDV: SIGNIFICANT CHANGE UP
GLUCOSE SERPL-MCNC: 101 MG/DL — HIGH (ref 70–99)
GLUCOSE UR QL: NEGATIVE — SIGNIFICANT CHANGE UP
GLUCOSE UR QL: NEGATIVE — SIGNIFICANT CHANGE UP
HCO3 BLDV-SCNC: 29 MMOL/L — SIGNIFICANT CHANGE UP (ref 22–29)
HCT VFR BLD CALC: 31.9 % — LOW (ref 37–47)
HCT VFR BLDA CALC: 36 % — LOW (ref 39–51)
HGB BLD CALC-MCNC: 11.9 G/DL — LOW (ref 12.6–17.4)
HGB BLD-MCNC: 11.2 G/DL — LOW (ref 12–16)
HYALINE CASTS # UR AUTO: 0 /LPF — SIGNIFICANT CHANGE UP (ref 0–7)
HYALINE CASTS # UR AUTO: 8 /LPF — HIGH (ref 0–7)
IMM GRANULOCYTES NFR BLD AUTO: 0.2 % — SIGNIFICANT CHANGE UP (ref 0.1–0.3)
KETONES UR-MCNC: SIGNIFICANT CHANGE UP
KETONES UR-MCNC: SIGNIFICANT CHANGE UP
LACTATE BLDV-MCNC: 1.4 MMOL/L — SIGNIFICANT CHANGE UP (ref 0.5–2)
LACTATE SERPL-SCNC: 1.4 MMOL/L — SIGNIFICANT CHANGE UP (ref 0.7–2)
LEUKOCYTE ESTERASE UR-ACNC: ABNORMAL
LEUKOCYTE ESTERASE UR-ACNC: ABNORMAL
LYMPHOCYTES # BLD AUTO: 0.83 K/UL — LOW (ref 1.2–3.4)
LYMPHOCYTES # BLD AUTO: 19.3 % — LOW (ref 20.5–51.1)
MCHC RBC-ENTMCNC: 29.9 PG — SIGNIFICANT CHANGE UP (ref 27–31)
MCHC RBC-ENTMCNC: 35.1 G/DL — SIGNIFICANT CHANGE UP (ref 32–37)
MCV RBC AUTO: 85.3 FL — SIGNIFICANT CHANGE UP (ref 81–99)
MONOCYTES # BLD AUTO: 0.67 K/UL — HIGH (ref 0.1–0.6)
MONOCYTES NFR BLD AUTO: 15.5 % — HIGH (ref 1.7–9.3)
NEUTROPHILS # BLD AUTO: 2.79 K/UL — SIGNIFICANT CHANGE UP (ref 1.4–6.5)
NEUTROPHILS NFR BLD AUTO: 64.8 % — SIGNIFICANT CHANGE UP (ref 42.2–75.2)
NITRITE UR-MCNC: NEGATIVE — SIGNIFICANT CHANGE UP
NITRITE UR-MCNC: POSITIVE
NRBC # BLD: 0 /100 WBCS — SIGNIFICANT CHANGE UP (ref 0–0)
NT-PROBNP SERPL-SCNC: 3301 PG/ML — HIGH (ref 0–300)
OSMOLALITY UR: 526 MOS/KG — SIGNIFICANT CHANGE UP (ref 50–1200)
PCO2 BLDV: 48 MMHG — HIGH (ref 39–42)
PH BLDV: 7.39 — SIGNIFICANT CHANGE UP (ref 7.32–7.43)
PH UR: 6 — SIGNIFICANT CHANGE UP (ref 5–8)
PH UR: 6 — SIGNIFICANT CHANGE UP (ref 5–8)
PLATELET # BLD AUTO: 191 K/UL — SIGNIFICANT CHANGE UP (ref 130–400)
PO2 BLDV: 27 MMHG — SIGNIFICANT CHANGE UP
POTASSIUM BLDV-SCNC: 5 MMOL/L — SIGNIFICANT CHANGE UP (ref 3.5–5.1)
POTASSIUM SERPL-MCNC: 5.8 MMOL/L — HIGH (ref 3.5–5)
POTASSIUM SERPL-SCNC: 5.8 MMOL/L — HIGH (ref 3.5–5)
POTASSIUM UR-SCNC: 54 MMOL/L — SIGNIFICANT CHANGE UP
PROT SERPL-MCNC: 7 G/DL — SIGNIFICANT CHANGE UP (ref 6–8)
PROT UR-MCNC: ABNORMAL
PROT UR-MCNC: ABNORMAL
RBC # BLD: 3.74 M/UL — LOW (ref 4.2–5.4)
RBC # FLD: 13 % — SIGNIFICANT CHANGE UP (ref 11.5–14.5)
RBC CASTS # UR COMP ASSIST: 10 /HPF — HIGH (ref 0–4)
RBC CASTS # UR COMP ASSIST: >720 /HPF — HIGH (ref 0–4)
SAO2 % BLDV: 34.2 % — SIGNIFICANT CHANGE UP
SARS-COV-2 RNA SPEC QL NAA+PROBE: SIGNIFICANT CHANGE UP
SODIUM SERPL-SCNC: 123 MMOL/L — LOW (ref 135–146)
SODIUM UR-SCNC: 60 MMOL/L — SIGNIFICANT CHANGE UP
SP GR SPEC: 1.02 — SIGNIFICANT CHANGE UP (ref 1.01–1.03)
SP GR SPEC: 1.02 — SIGNIFICANT CHANGE UP (ref 1.01–1.03)
TROPONIN T SERPL-MCNC: <0.01 NG/ML — SIGNIFICANT CHANGE UP
UROBILINOGEN FLD QL: SIGNIFICANT CHANGE UP
UROBILINOGEN FLD QL: SIGNIFICANT CHANGE UP
WBC # BLD: 4.31 K/UL — LOW (ref 4.8–10.8)
WBC # FLD AUTO: 4.31 K/UL — LOW (ref 4.8–10.8)
WBC UR QL: 15 /HPF — HIGH (ref 0–5)
WBC UR QL: 510 /HPF — HIGH (ref 0–5)

## 2022-08-07 PROCEDURE — 99223 1ST HOSP IP/OBS HIGH 75: CPT | Mod: AI

## 2022-08-07 PROCEDURE — 71045 X-RAY EXAM CHEST 1 VIEW: CPT | Mod: 26

## 2022-08-07 PROCEDURE — 93010 ELECTROCARDIOGRAM REPORT: CPT

## 2022-08-07 PROCEDURE — 99497 ADVNCD CARE PLAN 30 MIN: CPT | Mod: 25

## 2022-08-07 PROCEDURE — 99285 EMERGENCY DEPT VISIT HI MDM: CPT | Mod: FS

## 2022-08-07 RX ORDER — SODIUM CHLORIDE 9 MG/ML
1000 INJECTION INTRAMUSCULAR; INTRAVENOUS; SUBCUTANEOUS ONCE
Refills: 0 | Status: COMPLETED | OUTPATIENT
Start: 2022-08-07 | End: 2022-08-07

## 2022-08-07 RX ORDER — CEFEPIME 1 G/1
2000 INJECTION, POWDER, FOR SOLUTION INTRAMUSCULAR; INTRAVENOUS ONCE
Refills: 0 | Status: COMPLETED | OUTPATIENT
Start: 2022-08-07 | End: 2022-08-07

## 2022-08-07 RX ORDER — ACETAMINOPHEN 500 MG
975 TABLET ORAL ONCE
Refills: 0 | Status: COMPLETED | OUTPATIENT
Start: 2022-08-07 | End: 2022-08-07

## 2022-08-07 RX ADMIN — Medication 975 MILLIGRAM(S): at 17:37

## 2022-08-07 RX ADMIN — CEFEPIME 100 MILLIGRAM(S): 1 INJECTION, POWDER, FOR SOLUTION INTRAMUSCULAR; INTRAVENOUS at 17:38

## 2022-08-07 RX ADMIN — Medication 975 MILLIGRAM(S): at 17:36

## 2022-08-07 RX ADMIN — SODIUM CHLORIDE 1000 MILLILITER(S): 9 INJECTION INTRAMUSCULAR; INTRAVENOUS; SUBCUTANEOUS at 17:19

## 2022-08-07 NOTE — ED PROVIDER NOTE - OBJECTIVE STATEMENT
89 year old female with a history of HTN and HLD presents to the ED from [facility] with altered mental status and fever.  Patient's daughter is at the bedside providing history states that her baseline mental status is ANO x3 and functional with some forgetfulness.  Today patient has been lethargic minimally responsive disoriented.  Nursing home noted fever of 103F.  Daughter has not noted cough, shortness of breath, vomiting, malodourous urine.

## 2022-08-07 NOTE — ED ADULT TRIAGE NOTE - CHIEF COMPLAINT QUOTE
Pt BIBEMS from [facility] for altered mental status and high fevers. At the NH, fever as high as 103F.  on scene.

## 2022-08-07 NOTE — H&P ADULT - NSHPLABSRESULTS_GEN_ALL_CORE
11.2   4.31  )-----------( 191      ( 07 Aug 2022 17:01 )             31.9       08-07    123<L>  |  87<L>  |  24<H>  ----------------------------<  101<H>  5.8<H>   |  23  |  1.3    Ca    8.9      07 Aug 2022 17:01    TPro  7.0  /  Alb  3.6  /  TBili  0.4  /  DBili  x   /  AST  36  /  ALT  7   /  AlkPhos  57  08-07

## 2022-08-07 NOTE — ED ADULT NURSE NOTE - OBJECTIVE STATEMENT
Pt sent in from [facility] for AMS. Pt is baseline ax0x3 and ambulatory with assistance. As per daughter pt gradually became confused since yest and today pt would not get out of bed. As per NH, pt had a temp of 103. No tylenol given at facility. Pt was axox1 at NH prior to arrival. Pt is now axox3 and able to state her name, , and recognize her daughter. Disoriented to situation. Pt is awake and not in any distress. Fall precautions maintained.

## 2022-08-07 NOTE — ED PROVIDER NOTE - NS ED ROS FT
Limited ROS due to AMS.    Constitutional: (+) fever  Eyes/ENT: (-) epistaxis  Respiratory: (-) cough  Gastrointestinal: (-) vomiting, (-) diarrhea  Neurological: (+) altered mental status

## 2022-08-07 NOTE — ED ADULT NURSE NOTE - NSIMPLEMENTINTERV_GEN_ALL_ED
Implemented All Fall Risk Interventions:  [place] to call system. Call bell, personal items and telephone within reach. Instruct patient to call for assistance. Room bathroom lighting operational. Non-slip footwear when patient is off stretcher. Physically safe environment: no spills, clutter or unnecessary equipment. Stretcher in lowest position, wheels locked, appropriate side rails in place. Provide visual cue, wrist band, yellow gown, etc. Monitor gait and stability. Monitor for mental status changes and reorient to person, place, and time. Review medications for side effects contributing to fall risk. Reinforce activity limits and safety measures with patient and family.

## 2022-08-07 NOTE — ED PROVIDER NOTE - PHYSICAL EXAMINATION
Physical Exam    Constitutional: No acute distress. Withdrawn and limited participation during exam  Eyes: Conjunctiva pink, Sclera clear, PERRLA, EOMI.  ENT: No nasal discharge. No oropharyngeal erythema, edema, or exudates. Uvula midline.   Cardiovascular: Regular rate, regular rhythm. No noted murmurs rubs or gallops.  Respiratory: unlabored respiratory effort, clear to auscultation bilaterally no wheezing, rales or rhonchi  Gastrointestinal: Normal bowel sounds. soft, non distended, non-tender abdomen.   Musculoskeletal: supple neck, no midline tenderness. No joint or bony deformity.   Integumentary: warm, dry, no rash  Neurologic: somnolent but arouses to verbal stimuli. Disoriented and minimally verbal.

## 2022-08-07 NOTE — ED ADULT NURSE NOTE - NSSEPSISSUSPECTED_ED_A_ED
Constitutional: Awake, Alert, non-toxic. NAD. Well appearing, well nourished.   HEAD: Normocephalic, atraumatic.   EYES: EOM intact, conjunctiva and sclera are clear bilaterally.   ENT: No rhinorrhea, patent, mucous membranes pink/moist, no drooling or stridor.   NECK: Supple, non-tender  RESPIRATORY: Normal respiratory effort  EXTREMITIES: Full passive and active ROM in all extremities; non-tender to palpation; distal pulses palpable and symmetric  SKIN: Warm, dry; good skin turgor, (+) 1.5 cm horizontal laceration at volar aspect of DIP, ROM intact, (+) mild decreased sensation, (+) active pulsatile bleed, no ecchymosis, brisk capillary refill.  NEURO: A&O x3. Sensory and motor functions are grossly intact. Speech is normal. Appearance and judgement seem appropriate for gender and age. Yes

## 2022-08-07 NOTE — ED ADULT TRIAGE NOTE - TEMPERATURE IN CELSIUS (DEGREES C)
36.8
Patient Specific Counseling (Will Not Stick From Patient To Patient): ********\\nSpecifically she has the erythemato-telangiectatic type, and would benefit from a topical vasoconstrictor. Reviewed metronidazole could be restarted, however it would likely make minimal improvement in this setting as it mainly helps with the papulopustular component. \\n*******
Detail Level: Detailed

## 2022-08-07 NOTE — H&P ADULT - ASSESSMENT
89 year old female with a history of HTN and HLD presents to the ED from [facility] with altered mental status and fever.    #Toxic Metabolic Encephalopathy likely 2/2 infection vs. hyponatremia   -For the last day  -At baseline ~AAO*2 to name and place, see HPI  -Fever curve here ranging .3  -s/p dose of cefipime in ED  -Covid negative  -UA not that impressive, small LE, no nitrates, no bacteria, some wbc (15)  -wbc 4k   -CXR unead but on wet read not impressive, no respiratory symptoms, on RA  -Send RVP  -f/u blood cultures and urine cultures (recieved)  -tylenol prn  -hold abx as more viral picture  -ID eval   -CT head non con given age   -not really followig commands, aspiration risk, npo for now until S+S eval    #Hyponatremia  -Na 123  -takes HCTZ 25 at home  -hold HCTZ  -given 1L NS in ED  -f/u repeat bmp  -f/u urine lytes    #elevated bnp  -no prior to compare  -dry on exam, cxr not overloaded  -on RA, no respiratory symptoms   -echo 3/22 with EF 63% normal sys fx, G1DD  -possibly elevated in setting of ckd  -no diuretics for now    #HTN  -takes metoprolol suc at home  -takes valsartan-HCTZ at home   -hold antihypertensives for now with acute infection, bp well controlled    #GOC  -as per son Rohan, he is HCP  -spoke with Rohan over the phone, discussed GOC, leaning toward DNR/DNI but wants to speak with rest of family first, full code for now  -Readdress with family later on     DVT ppx: heparin   Diet: npo until S+S eval  Code Status: full code (see above)  Dispo:acute          89 year old female with a history of HTN and HLD presents to the ED from [facility] with altered mental status and fever.    #Toxic Metabolic Encephalopathy likely 2/2 infection vs. hyponatremia   -For the last day  -At baseline ~AAO*2 to name and place, see HPI  -Fever curve here ranging .3  -s/p dose of cefipime in ED  -Covid negative  -UA not that impressive, small LE, no nitrates, no bacteria, some wbc (15)  -update: repeat UA grossly positive, will start rocephin   -wbc 4k   -CXR unead but on wet read not impressive, no respiratory symptoms, on RA  -Send RVP  -f/u blood cultures and urine cultures (recieved)  -tylenol prn  -hold abx as more viral picture  -ID eval   -CT head non con given age   -not really followig commands, aspiration risk, npo for now until S+S eval    #Hyponatremia  -Na 123  -takes HCTZ 25 at home  -hold HCTZ  -given 1L NS in ED  -f/u repeat bmp  -f/u urine lytes    #elevated bnp  -no prior to compare  -dry on exam, cxr not overloaded  -on RA, no respiratory symptoms   -echo 3/22 with EF 63% normal sys fx, G1DD  -possibly elevated in setting of ckd  -no diuretics for now    #HTN  -takes metoprolol suc at home  -takes valsartan-HCTZ at home   -hold antihypertensives for now with acute infection, bp well controlled    #GOC  -as per son Rohan, he is HCP  -spoke with Rohan over the phone, discussed GOC, leaning toward DNR/DNI but wants to speak with rest of family first, full code for now  -Readdress with family later on     DVT ppx: heparin   Diet: npo until S+S eval  Code Status: full code (see above)  Dispo:acute          89 year old female with a history of HTN and HLD presents to the ED from [facility] with altered mental status and fever.    #Toxic Metabolic Encephalopathy likely 2/2 infection vs. hyponatremia   -For the last day  -At baseline ~AAO*2 to name and place, see HPI  -Fever curve here ranging .3  -s/p dose of cefipime in ED  -Covid negative  -UA not that impressive, small LE, no nitrates, no bacteria, some wbc (15)  -update: repeat UA with large wbc and positive nitrate not seen before, possibly from barker placement? will start rocephin   -wbc 4k   -CXR unead but on wet read not impressive, no respiratory symptoms, on RA  -Send RVP  -f/u blood cultures and urine cultures (recieved)  -tylenol prn  -hold abx as more viral picture  -ID eval   -CT head non con given age   -not really followig commands, aspiration risk, npo for now until S+S eval    #Hyponatremia  -Na 123  -takes HCTZ 25 at home  -hold HCTZ  -given 1L NS in ED  -f/u repeat bmp  -f/u urine lytes    #elevated bnp  -no prior to compare  -dry on exam, cxr not overloaded  -on RA, no respiratory symptoms   -echo 3/22 with EF 63% normal sys fx, G1DD  -possibly elevated in setting of ckd  -no diuretics for now    #HTN  -takes metoprolol suc at home  -takes valsartan-HCTZ at home   -hold antihypertensives for now with acute infection, bp well controlled    #GOC  -as per son Rohan, he is HCP  -spoke with Rohan over the phone, discussed GOC, leaning toward DNR/DNI but wants to speak with rest of family first, full code for now  -Readdress with family later on     DVT ppx: heparin   Diet: npo until S+S eval  Code Status: full code (see above)  Dispo:acute

## 2022-08-07 NOTE — H&P ADULT - CONVERSATION DETAILS
***Pt is currently altered. Consents obtained from Son (Maxwell Richardson; HCP) over the phone      The pt is to be FULL CODE with no limitations in medical interventions.

## 2022-08-07 NOTE — ED PROVIDER NOTE - ATTENDING APP SHARED VISIT CONTRIBUTION OF CARE
I personally evaluated the patient. I reviewed the Resident’s or Physician Assistant’s note (as assigned above), and agree with the findings and plan except as documented in my note.  89-year-old female past medical history significant for hypertension, dyslipidemia, CHF sent to the ED from SNF with fever 103.2 and confusion.  As per daughter by telephone, she was last seen on Friday and was well and with no complaints.  Patient unable to give history today due to confusion.  She is oriented only to person.  SNF notes reviewed.  Vitals noted.  CONSTITUTIONAL: Thin, oriented to person only.   HEAD: Normocephalic; atraumatic.   EYES: PERRL; EOM intact. Conjunctiva normal B/L.   ENT: Normal pharynx with no tonsillar hypertrophy. MMM.  NECK: Supple; non-tender; no cervical lymphadenopathy.   CHEST: Normal chest excursion with respiration.   CARDIOVASCULAR: Normal S1, S2; no murmurs, rubs, or gallops.   RESPIRATORY: Normal chest excursion with respiration; breath sounds clear and equal bilaterally; no wheezes, rhonchi, or rales.  GI/: Normal bowel sounds; non-distended; non-tender.  BACK: No evidence of trauma or deformity. Non-tender to palpation. No CVA tenderness.   EXT: Normal ROM in all four extremities; non-tender to palpation; distal pulses are normal. + leg edema B/L.   SKIN: Normal for age and race; warm; dry; good turgor.  NEURO: A & O x 4; CN 2-12 intact. Grossly unremarkable.

## 2022-08-07 NOTE — H&P ADULT - ATTENDING COMMENTS
***HX and physical limited due to AMS. Supplemental information obtained from NH chart, family, house staff, and EMR.       89 YO F with a PMH of HTN and HLD who was sent in from her NH ([facility]) for eval of AMS for the past x 1 day. Associated w/ fever. Unable to obtain ROS.     In the ED, pt's BNP elevated, hyponatremic (120), and UA was positive. Started on IV ABXs (Cefe).     FMHx: Reviewed, not relevant    Physical exam shows pt in NAD, will awaken to her name and stare at you, but does not answer any questions. HR (92), febrile (101), not hypoxic on RA. A&Ox0. Does not follow commands. CTA B/L with no W/C/R. RRR, no M/G/R. ABD is soft and non-tender, normoactive BSs. LEs without swelling. No rashes. Lester in place w/ dark urine. Labs and radiology as above.     Metabolic encephalopathy from UTI; sepsis present on admission. FU cultures. PRN pain meds. IV ABXs (Ceftriaxone).     Hyponatremia. Send urine/serum osmo and urine lytes. TSH. Serial BMPs. Do not over correct (<8-10 in 24 hours). Send Echo.     DIMAS vs CKD3. Send UA, urine lytes, urine pr:cr ratio, and trend BMP. Monitor urinary out-put. Hold nephrotoxic agents.     HX of HTN and HLD. Restart home meds, except as stated above. DVT PPX. Inform PCP of pt's admission to hospital. My note supersedes the residents note.     Date seen by Attendin22

## 2022-08-07 NOTE — H&P ADULT - NSHPPHYSICALEXAM_GEN_ALL_CORE
GENERAL: NAD, lying in bed comfortably  HEAD:  Atraumatic, Normocephalic  CHEST/LUNG: decreased breth sounds. Unlabored respirations  HEART: Regular rate and rhythm; No murmurs, rubs, or gallops  ABDOMEN: Soft, nontender, nondistended  EXTREMITIES:  No clubbing, cyanosis, or edema  NERVOUS SYSTEM:  A&Ox1 to name, lethargic

## 2022-08-07 NOTE — ED PROVIDER NOTE - CARE PLAN
Principal Discharge DX:	Altered mental status  Secondary Diagnosis:	Fever  Secondary Diagnosis:	Hyponatremia   1 Principal Discharge DX:	Metabolic encephalopathy  Secondary Diagnosis:	Fever  Secondary Diagnosis:	Hyponatremia

## 2022-08-07 NOTE — ED PROVIDER NOTE - NS ED ATTENDING STATEMENT MOD
This was a shared visit with the RENETTA. I reviewed and verified the documentation and independently performed the documented:

## 2022-08-07 NOTE — ED PROVIDER NOTE - CLINICAL SUMMARY MEDICAL DECISION MAKING FREE TEXT BOX
89-year-old female past medical history as documented sent to the ED from SNF with fever and confusion.  Patient encephalopathic secondary to infectious etiology.  IV fluids and antibiotics given.  Patient admitted to medicine.

## 2022-08-08 PROBLEM — I10 ESSENTIAL (PRIMARY) HYPERTENSION: Chronic | Status: ACTIVE | Noted: 2022-03-13

## 2022-08-08 PROBLEM — S72.009A FRACTURE OF UNSPECIFIED PART OF NECK OF UNSPECIFIED FEMUR, INITIAL ENCOUNTER FOR CLOSED FRACTURE: Chronic | Status: ACTIVE | Noted: 2022-03-13

## 2022-08-08 LAB
ALBUMIN SERPL ELPH-MCNC: 3.6 G/DL — SIGNIFICANT CHANGE UP (ref 3.5–5.2)
ALP SERPL-CCNC: 57 U/L — SIGNIFICANT CHANGE UP (ref 30–115)
ALT FLD-CCNC: 7 U/L — SIGNIFICANT CHANGE UP (ref 0–41)
ANION GAP SERPL CALC-SCNC: 14 MMOL/L — SIGNIFICANT CHANGE UP (ref 7–14)
ANION GAP SERPL CALC-SCNC: 15 MMOL/L — HIGH (ref 7–14)
AST SERPL-CCNC: 26 U/L — SIGNIFICANT CHANGE UP (ref 0–41)
BASOPHILS # BLD AUTO: 0.01 K/UL — SIGNIFICANT CHANGE UP (ref 0–0.2)
BASOPHILS NFR BLD AUTO: 0.2 % — SIGNIFICANT CHANGE UP (ref 0–1)
BILIRUB SERPL-MCNC: 0.3 MG/DL — SIGNIFICANT CHANGE UP (ref 0.2–1.2)
BUN SERPL-MCNC: 26 MG/DL — HIGH (ref 10–20)
BUN SERPL-MCNC: 27 MG/DL — HIGH (ref 10–20)
CALCIUM SERPL-MCNC: 8.6 MG/DL — SIGNIFICANT CHANGE UP (ref 8.5–10.1)
CALCIUM SERPL-MCNC: 9.4 MG/DL — SIGNIFICANT CHANGE UP (ref 8.5–10.1)
CHLORIDE SERPL-SCNC: 86 MMOL/L — LOW (ref 98–110)
CHLORIDE SERPL-SCNC: 86 MMOL/L — LOW (ref 98–110)
CO2 SERPL-SCNC: 26 MMOL/L — SIGNIFICANT CHANGE UP (ref 17–32)
CO2 SERPL-SCNC: 26 MMOL/L — SIGNIFICANT CHANGE UP (ref 17–32)
CREAT ?TM UR-MCNC: 95 MG/DL — SIGNIFICANT CHANGE UP
CREAT SERPL-MCNC: 1.2 MG/DL — SIGNIFICANT CHANGE UP (ref 0.7–1.5)
CREAT SERPL-MCNC: 1.3 MG/DL — SIGNIFICANT CHANGE UP (ref 0.7–1.5)
EGFR: 38 ML/MIN/1.73M2 — LOW
EGFR: 42 ML/MIN/1.73M2 — LOW
EOSINOPHIL # BLD AUTO: 0 K/UL — SIGNIFICANT CHANGE UP (ref 0–0.7)
EOSINOPHIL NFR BLD AUTO: 0 % — SIGNIFICANT CHANGE UP (ref 0–8)
GLUCOSE SERPL-MCNC: 95 MG/DL — SIGNIFICANT CHANGE UP (ref 70–99)
GLUCOSE SERPL-MCNC: 97 MG/DL — SIGNIFICANT CHANGE UP (ref 70–99)
HCT VFR BLD CALC: 32.8 % — LOW (ref 37–47)
HGB BLD-MCNC: 11.2 G/DL — LOW (ref 12–16)
IMM GRANULOCYTES NFR BLD AUTO: 0.5 % — HIGH (ref 0.1–0.3)
LYMPHOCYTES # BLD AUTO: 1.65 K/UL — SIGNIFICANT CHANGE UP (ref 1.2–3.4)
LYMPHOCYTES # BLD AUTO: 25.9 % — SIGNIFICANT CHANGE UP (ref 20.5–51.1)
MAGNESIUM SERPL-MCNC: 1.6 MG/DL — LOW (ref 1.8–2.4)
MCHC RBC-ENTMCNC: 28.9 PG — SIGNIFICANT CHANGE UP (ref 27–31)
MCHC RBC-ENTMCNC: 34.1 G/DL — SIGNIFICANT CHANGE UP (ref 32–37)
MCV RBC AUTO: 84.5 FL — SIGNIFICANT CHANGE UP (ref 81–99)
MONOCYTES # BLD AUTO: 0.98 K/UL — HIGH (ref 0.1–0.6)
MONOCYTES NFR BLD AUTO: 15.4 % — HIGH (ref 1.7–9.3)
NEUTROPHILS # BLD AUTO: 3.71 K/UL — SIGNIFICANT CHANGE UP (ref 1.4–6.5)
NEUTROPHILS NFR BLD AUTO: 58 % — SIGNIFICANT CHANGE UP (ref 42.2–75.2)
NRBC # BLD: 0 /100 WBCS — SIGNIFICANT CHANGE UP (ref 0–0)
OSMOLALITY SERPL: 269 MOS/KG — LOW (ref 280–301)
PLATELET # BLD AUTO: 169 K/UL — SIGNIFICANT CHANGE UP (ref 130–400)
POTASSIUM SERPL-MCNC: 3.9 MMOL/L — SIGNIFICANT CHANGE UP (ref 3.5–5)
POTASSIUM SERPL-MCNC: 4.1 MMOL/L — SIGNIFICANT CHANGE UP (ref 3.5–5)
POTASSIUM SERPL-SCNC: 3.9 MMOL/L — SIGNIFICANT CHANGE UP (ref 3.5–5)
POTASSIUM SERPL-SCNC: 4.1 MMOL/L — SIGNIFICANT CHANGE UP (ref 3.5–5)
PROT ?TM UR-MCNC: 233 MG/DLG/24H — SIGNIFICANT CHANGE UP
PROT SERPL-MCNC: 6.3 G/DL — SIGNIFICANT CHANGE UP (ref 6–8)
PROT/CREAT UR-RTO: 2.5 RATIO — HIGH (ref 0–0.2)
RBC # BLD: 3.88 M/UL — LOW (ref 4.2–5.4)
RBC # FLD: 12.9 % — SIGNIFICANT CHANGE UP (ref 11.5–14.5)
SODIUM SERPL-SCNC: 126 MMOL/L — LOW (ref 135–146)
SODIUM SERPL-SCNC: 127 MMOL/L — LOW (ref 135–146)
UUN UR-MCNC: 797 MG/DL — SIGNIFICANT CHANGE UP
WBC # BLD: 6.38 K/UL — SIGNIFICANT CHANGE UP (ref 4.8–10.8)
WBC # FLD AUTO: 6.38 K/UL — SIGNIFICANT CHANGE UP (ref 4.8–10.8)

## 2022-08-08 PROCEDURE — 70450 CT HEAD/BRAIN W/O DYE: CPT | Mod: 26

## 2022-08-08 PROCEDURE — 71250 CT THORAX DX C-: CPT | Mod: 26

## 2022-08-08 PROCEDURE — 99233 SBSQ HOSP IP/OBS HIGH 50: CPT

## 2022-08-08 RX ORDER — ACETAMINOPHEN 500 MG
650 TABLET ORAL EVERY 6 HOURS
Refills: 0 | Status: DISCONTINUED | OUTPATIENT
Start: 2022-08-08 | End: 2022-08-21

## 2022-08-08 RX ORDER — AZITHROMYCIN 500 MG/1
500 TABLET, FILM COATED ORAL ONCE
Refills: 0 | Status: COMPLETED | OUTPATIENT
Start: 2022-08-08 | End: 2022-08-08

## 2022-08-08 RX ORDER — HEPARIN SODIUM 5000 [USP'U]/ML
5000 INJECTION INTRAVENOUS; SUBCUTANEOUS EVERY 12 HOURS
Refills: 0 | Status: DISCONTINUED | OUTPATIENT
Start: 2022-08-08 | End: 2022-08-21

## 2022-08-08 RX ORDER — ENOXAPARIN SODIUM 100 MG/ML
40 INJECTION SUBCUTANEOUS
Qty: 0 | Refills: 0 | DISCHARGE

## 2022-08-08 RX ORDER — DOCUSATE SODIUM 100 MG
1 CAPSULE ORAL
Qty: 0 | Refills: 0 | DISCHARGE

## 2022-08-08 RX ORDER — CEFTRIAXONE 500 MG/1
1000 INJECTION, POWDER, FOR SOLUTION INTRAMUSCULAR; INTRAVENOUS EVERY 24 HOURS
Refills: 0 | Status: COMPLETED | OUTPATIENT
Start: 2022-08-08 | End: 2022-08-10

## 2022-08-08 RX ORDER — AZITHROMYCIN 500 MG/1
500 TABLET, FILM COATED ORAL EVERY 24 HOURS
Refills: 0 | Status: DISCONTINUED | OUTPATIENT
Start: 2022-08-09 | End: 2022-08-09

## 2022-08-08 RX ORDER — AZITHROMYCIN 500 MG/1
TABLET, FILM COATED ORAL
Refills: 0 | Status: DISCONTINUED | OUTPATIENT
Start: 2022-08-08 | End: 2022-08-09

## 2022-08-08 RX ADMIN — AZITHROMYCIN 500 MILLIGRAM(S): 500 TABLET, FILM COATED ORAL at 17:08

## 2022-08-08 RX ADMIN — HEPARIN SODIUM 5000 UNIT(S): 5000 INJECTION INTRAVENOUS; SUBCUTANEOUS at 05:15

## 2022-08-08 RX ADMIN — CEFTRIAXONE 100 MILLIGRAM(S): 500 INJECTION, POWDER, FOR SOLUTION INTRAMUSCULAR; INTRAVENOUS at 05:15

## 2022-08-08 RX ADMIN — HEPARIN SODIUM 5000 UNIT(S): 5000 INJECTION INTRAVENOUS; SUBCUTANEOUS at 17:08

## 2022-08-08 NOTE — ED ADULT NURSE REASSESSMENT NOTE - NS ED NURSE REASSESS COMMENT FT1
RN and PCA cleaned pt perianal and perineal area, pt had BM, cleaned pt skin and barker catheter, applied clean linens, emptied barker catheter. Pt has nonblanchable redness on sacrum.

## 2022-08-08 NOTE — CONSULT NOTE ADULT - SUBJECTIVE AND OBJECTIVE BOX
LOREE CELAYA  89y, Female  Allergy: No Known Allergies      CHIEF COMPLAINT:     HPI:  89 year old female with a history of HTN and HLD presents to the ED from [facility] with altered mental status and fever. On presentation, pt minimally responsive, only gives name, unable to collect history. Spoke with son Rohan over the phone. Pt was noted to be not herself, not recognizing her daughter at the NH, also spiked a fever at NH to 103 so brought to ED. As per son, at baseline pt ~AAO*2, has had progressive functional/cognitive decline over the last few months, sometimes forgets names but knows herself and kids, usually knows where she is, likely not know year/president, walks with walker, needs help dressing/bathroom. Son lives in [place], his sister lives on  and visits mother daily. As per ED note, Daughter has not noted cough, shortness of breath, vomiting, malodourous urine.    In the ED, pt given a dose of cefipime and 1L bolus NS  Vitals: T(F): .3 HR: 85 BP: 132/60 RR: 20SpO2: 96%  Labs: significant for Na 123, bnp 3300, Ua with small LE, wbc 15, no bacteria  ECG: Sinus rhythm with Premature supraventricular complexes  CXR: unread, on wet read questionable Left cpa opacity/effusion     Pt admitted for ams and fever     (07 Aug 2022 23:31)    FAMILY HISTORY:  Patient unable to provide medical history      PAST MEDICAL & SURGICAL HISTORY:  HTN (hypertension)      Femur neck fracture  right      History of hip surgery  Right           SOCIAL HISTORY  Social History:  Substance Use (street drugs): ( x ) never used  (  ) other:  Tobacco Usage:  ( x  ) never smoked   (   ) former smoker   (   ) current smoker  (     ) pack year  Alcohol Usage: None (13 Mar 2022 19:00)        ROS  unable to obtain    VITALS:  T(F): 99, Max: 102.3 (22 @ 15:29)  HR: 85  BP: 132/60  RR: 20Vital Signs Last 24 Hrs  T(C): 37.2 (07 Aug 2022 21:20), Max: 39.1 (07 Aug 2022 15:29)  T(F): 99 (07 Aug 2022 21:20), Max: 102.3 (07 Aug 2022 15:29)  HR: 85 (07 Aug 2022 21:20) (75 - 85)  BP: 132/60 (07 Aug 2022 21:20) (127/60 - 136/70)  BP(mean): --  RR: 20 (07 Aug 2022 21:20) (18 - 20)  SpO2: 96% (07 Aug 2022 21:20) (96% - 98%)    Parameters below as of 07 Aug 2022 21:20  Patient On (Oxygen Delivery Method): room air        PHYSICAL EXAM:  HEENT: Normocephalic, atraumatic  Neck: supple, no lymphadenopathy  CV: Regular rate & regular rhythm  Lungs: decreased BS at bases, no fremitus  Abdomen: Soft, BS present  Ext: Warm, well perfused  Neuro: non focal, awake  Skin: no rash, no erythema    TESTS & MEASUREMENTS:                        11.2   4.31  )-----------( 191      ( 07 Aug 2022 17:01 )             31.9     08-08    127<L>  |  86<L>  |  26<H>  ----------------------------<  95  4.1   |  26  |  1.2    Ca    9.4      08 Aug 2022 00:41    TPro  7.0  /  Alb  3.6  /  TBili  0.4  /  DBili  x   /  AST  36  /  ALT  7   /  AlkPhos  57  08-07      LIVER FUNCTIONS - ( 07 Aug 2022 17:01 )  Alb: 3.6 g/dL / Pro: 7.0 g/dL / ALK PHOS: 57 U/L / ALT: 7 U/L / AST: 36 U/L / GGT: x           Urinalysis Basic - ( 07 Aug 2022 22:50 )    Color: Light Orange / Appearance: Turbid / S.020 / pH: x  Gluc: x / Ketone: Trace  / Bili: Negative / Urobili: <2 mg/dL   Blood: x / Protein: 100 mg/dL / Nitrite: Positive   Leuk Esterase: Large / RBC: >720 /HPF /  /HPF   Sq Epi: x / Non Sq Epi: 2 /HPF / Bacteria: Negative          Lactate, Blood: 1.4 mmol/L (22 @ 17:01)  Blood Gas Venous - Lactate: 1.40 mmol/L (22 @ 16:53)      INFECTIOUS DISEASES TESTING      RADIOLOGY & ADDITIONAL TESTS:  I have personally reviewed the last Chest xray  CXR      CT      CARDIOLOGY TESTING  12 Lead ECG:   Ventricular Rate 79 BPM    Atrial Rate 79 BPM    P-R Interval 198 ms    QRS Duration 78 ms    Q-T Interval 384 ms    QTC Calculation(Bazett) 440 ms    P Axis 84 degrees    R Axis -7 degrees    T Axis 5 degrees    Diagnosis Line Sinus rhythm with Premature supraventricular complexes  Nonspecific ST abnormality  Abnormal ECG    Confirmed by Mukesh Douglass ([id number]) on 2022 10:33:20 PM (22 @ 18:19)      MEDICATIONS  cefTRIAXone   IVPB 1000  heparin   Injectable 5000      ANTIBIOTICS:  cefTRIAXone   IVPB 1000 milliGRAM(s) IV Intermittent every 24 hours      All available historical data has been reviewed

## 2022-08-08 NOTE — PROGRESS NOTE ADULT - SUBJECTIVE AND OBJECTIVE BOX
LOREE CELAYA  89y, Female  Allergy: No Known Allergies    Hospital Day: 1d    Patient seen and examined. No acute events overnight    PMH/PSH:  PAST MEDICAL & SURGICAL HISTORY:  HTN (hypertension)      Femur neck fracture  right      History of hip surgery  Right           VITALS:  T(F): 99 (22 @ 15:38), Max: 100.4 (22 @ 19:03)  HR: 57 (22 @ 15:38)  BP: 160/73 (22 @ 15:38) (126/60 - 160/73)  RR: 19 (22 @ 15:38)  SpO2: 97% (22 @ 15:38)    TESTS & MEASUREMENTS:  Weight (Kg):                             11.2   6.38  )-----------( 169      ( 08 Aug 2022 06:48 )             32.8       08-    126<L>  |  86<L>  |  27<H>  ----------------------------<  97  3.9   |  26  |  1.3    Ca    8.6      08 Aug 2022 06:48  Mg     1.6     08-08    TPro  6.3  /  Alb  3.6  /  TBili  0.3  /  DBili  x   /  AST  26  /  ALT  7   /  AlkPhos  57  08-08    LIVER FUNCTIONS - ( 08 Aug 2022 06:48 )  Alb: 3.6 g/dL / Pro: 6.3 g/dL / ALK PHOS: 57 U/L / ALT: 7 U/L / AST: 26 U/L / GGT: x           CARDIAC MARKERS ( 07 Aug 2022 17:01 )  x     / <0.01 ng/mL / x     / x     / x            Urinalysis Basic - ( 07 Aug 2022 22:50 )    Color: Light Orange / Appearance: Turbid / S.020 / pH: x  Gluc: x / Ketone: Trace  / Bili: Negative / Urobili: <2 mg/dL   Blood: x / Protein: 100 mg/dL / Nitrite: Positive   Leuk Esterase: Large / RBC: >720 /HPF /  /HPF   Sq Epi: x / Non Sq Epi: 2 /HPF / Bacteria: Negative        RADIOLOGY & ADDITIONAL TESTS:    RECENT DIAGNOSTIC ORDERS:  Magnesium, Serum: AM Sched. Collection: 09-Aug-2022 04:30 (22 @ 14:51)  Comprehensive Metabolic Panel: AM Sched. Collection: 09-Aug-2022 04:30 (22 @ 14:51)  Complete Blood Count: AM Sched. Collection: 09-Aug-2022 04:30 (22 @ 14:51)  CT Chest No Cont: Routine   Indication: rule out pna  Transport: [facility] (22 @ 14:34)  Respiratory Viral Panel with COVID-19 by ARSH: Routine (22 @ 00:14)  Diet, NPO:   Except Medications (22 @ 00:14)  Wet Read: Routine  WET READ: hyperinflated, no change from prior. (22 @ 19:01)      MEDICATIONS:  MEDICATIONS  (STANDING):  cefTRIAXone   IVPB 1000 milliGRAM(s) IV Intermittent every 24 hours  heparin   Injectable 5000 Unit(s) SubCutaneous every 12 hours    MEDICATIONS  (PRN):  acetaminophen     Tablet .. 650 milliGRAM(s) Oral every 6 hours PRN Temp greater or equal to 38.5C (101.3F)      HOME MEDICATIONS:  Metoprolol Succinate ER 25 mg oral tablet, extended release ()  valsartan-hydrochlorothiazide 160 mg-25 mg oral tablet ()      REVIEW OF SYSTEMS:  All other review of systems is negative unless indicated above.     PHYSICAL EXAM:  PHYSICAL EXAM:  GENERAL: NAD, well-developed  HEAD:  Atraumatic, Normocephalic  NECK: Supple, No JVD  CHEST/LUNG: Clear to auscultation bilaterally; No wheeze  HEART: Regular rate and rhythm; No murmurs, rubs, or gallops  ABDOMEN: Soft, Nontender, Nondistended; Bowel sounds present  EXTREMITIES:  2+ Peripheral Pulses, No clubbing, cyanosis, or edema  SKIN: No rashes or lesions

## 2022-08-08 NOTE — PROGRESS NOTE ADULT - ASSESSMENT
89 year old female with a history of HTN and HLD presents to the ED from [facility] with altered mental status, cough, and fever.    # 89 year old female with a history of HTN and HLD presents to the ED from [facility] with altered mental status, cough, and fever.    #Sepsis, POA  #Metabolic Encephalopathy  #Suspected GNR Pneumonia and/or UTI  UA with hematuria  CXR 08/07: Pleural-based density within the left lower lung. Prominence interstitial markings  - CT Chest ordered  - Cont IV ceftraixone 1g q24h  - Start azithromycin 500mg qD  - Strep/legionella urine antigen  - f/u BCx/UCx  - Daughter wants to hold off on NGT for now    #Hyponatremia    #HTN/HLD  - Currently oral meds are held due to encephalopathy 89 year old female with a history of HTN and HLD presents to the ED from [facility] with altered mental status, cough, and fever.    #Sepsis, POA  #Metabolic Encephalopathy, currently AOx0-1  #Suspected GNR Pneumonia and/or UTI  UA with hematuria  CXR 08/07: Pleural-based density within the left lower lung. Prominence interstitial markings  - CT Chest ordered  - Cont IV ceftraixone 1g q24h  - Start azithromycin 500mg qD  - Strep/legionella urine antigen  - f/u BCx/UCx  - Daughter wants to hold off on NGT for now    #Hyponatremia  Low serum osmolality, high UOsm. Possible SIADH?, r/o hypothyroidism, cortisol deficiency  - f/u TSH, AM cortisol  - Monitor BMP  - Will hold off on IVF for now    #HTN/HLD  - Currently oral meds are held due to encephalopathy    DVT PPX, heparin    #Progress Note Handoff  Pending (specify): CT Chest, cont IV abx, BCx/UCx, mental status improvement  Family discussion: d/w daughter regarding tx for pna/uti  Disposition: NH

## 2022-08-08 NOTE — PATIENT PROFILE ADULT - FALL HARM RISK - HARM RISK INTERVENTIONS

## 2022-08-09 DIAGNOSIS — R41.82 ALTERED MENTAL STATUS, UNSPECIFIED: ICD-10-CM

## 2022-08-09 DIAGNOSIS — N39.0 URINARY TRACT INFECTION, SITE NOT SPECIFIED: ICD-10-CM

## 2022-08-09 DIAGNOSIS — R53.81 OTHER MALAISE: ICD-10-CM

## 2022-08-09 DIAGNOSIS — Z51.5 ENCOUNTER FOR PALLIATIVE CARE: ICD-10-CM

## 2022-08-09 DIAGNOSIS — Z71.89 OTHER SPECIFIED COUNSELING: ICD-10-CM

## 2022-08-09 LAB
-  AMIKACIN: SIGNIFICANT CHANGE UP
-  AMOXICILLIN/CLAVULANIC ACID: SIGNIFICANT CHANGE UP
-  AMPICILLIN/SULBACTAM: SIGNIFICANT CHANGE UP
-  AMPICILLIN: SIGNIFICANT CHANGE UP
-  AZTREONAM: SIGNIFICANT CHANGE UP
-  CEFAZOLIN: SIGNIFICANT CHANGE UP
-  CEFEPIME: SIGNIFICANT CHANGE UP
-  CEFOXITIN: SIGNIFICANT CHANGE UP
-  CEFTRIAXONE: SIGNIFICANT CHANGE UP
-  CIPROFLOXACIN: SIGNIFICANT CHANGE UP
-  ERTAPENEM: SIGNIFICANT CHANGE UP
-  GENTAMICIN: SIGNIFICANT CHANGE UP
-  IMIPENEM: SIGNIFICANT CHANGE UP
-  LEVOFLOXACIN: SIGNIFICANT CHANGE UP
-  MEROPENEM: SIGNIFICANT CHANGE UP
-  NITROFURANTOIN: SIGNIFICANT CHANGE UP
-  PIPERACILLIN/TAZOBACTAM: SIGNIFICANT CHANGE UP
-  TIGECYCLINE: SIGNIFICANT CHANGE UP
-  TOBRAMYCIN: SIGNIFICANT CHANGE UP
-  TRIMETHOPRIM/SULFAMETHOXAZOLE: SIGNIFICANT CHANGE UP
ALBUMIN SERPL ELPH-MCNC: 3.3 G/DL — LOW (ref 3.5–5.2)
ALP SERPL-CCNC: 53 U/L — SIGNIFICANT CHANGE UP (ref 30–115)
ALT FLD-CCNC: 10 U/L — SIGNIFICANT CHANGE UP (ref 0–41)
ANION GAP SERPL CALC-SCNC: 13 MMOL/L — SIGNIFICANT CHANGE UP (ref 7–14)
ANION GAP SERPL CALC-SCNC: 15 MMOL/L — HIGH (ref 7–14)
AST SERPL-CCNC: 34 U/L — SIGNIFICANT CHANGE UP (ref 0–41)
BILIRUB SERPL-MCNC: 0.2 MG/DL — SIGNIFICANT CHANGE UP (ref 0.2–1.2)
BUN SERPL-MCNC: 26 MG/DL — HIGH (ref 10–20)
BUN SERPL-MCNC: 28 MG/DL — HIGH (ref 10–20)
CALCIUM SERPL-MCNC: 8.8 MG/DL — SIGNIFICANT CHANGE UP (ref 8.5–10.1)
CALCIUM SERPL-MCNC: 8.9 MG/DL — SIGNIFICANT CHANGE UP (ref 8.5–10.1)
CHLORIDE SERPL-SCNC: 89 MMOL/L — LOW (ref 98–110)
CHLORIDE SERPL-SCNC: 89 MMOL/L — LOW (ref 98–110)
CO2 SERPL-SCNC: 22 MMOL/L — SIGNIFICANT CHANGE UP (ref 17–32)
CO2 SERPL-SCNC: 25 MMOL/L — SIGNIFICANT CHANGE UP (ref 17–32)
CREAT SERPL-MCNC: 1.1 MG/DL — SIGNIFICANT CHANGE UP (ref 0.7–1.5)
CREAT SERPL-MCNC: 1.1 MG/DL — SIGNIFICANT CHANGE UP (ref 0.7–1.5)
CULTURE RESULTS: SIGNIFICANT CHANGE UP
EGFR: 47 ML/MIN/1.73M2 — LOW
EGFR: 47 ML/MIN/1.73M2 — LOW
GLUCOSE SERPL-MCNC: 80 MG/DL — SIGNIFICANT CHANGE UP (ref 70–99)
GLUCOSE SERPL-MCNC: 88 MG/DL — SIGNIFICANT CHANGE UP (ref 70–99)
HCT VFR BLD CALC: 32.6 % — LOW (ref 37–47)
HGB BLD-MCNC: 11.5 G/DL — LOW (ref 12–16)
MAGNESIUM SERPL-MCNC: 1.6 MG/DL — LOW (ref 1.8–2.4)
MCHC RBC-ENTMCNC: 29.6 PG — SIGNIFICANT CHANGE UP (ref 27–31)
MCHC RBC-ENTMCNC: 35.3 G/DL — SIGNIFICANT CHANGE UP (ref 32–37)
MCV RBC AUTO: 83.8 FL — SIGNIFICANT CHANGE UP (ref 81–99)
METHOD TYPE: SIGNIFICANT CHANGE UP
NRBC # BLD: 0 /100 WBCS — SIGNIFICANT CHANGE UP (ref 0–0)
ORGANISM # SPEC MICROSCOPIC CNT: SIGNIFICANT CHANGE UP
ORGANISM # SPEC MICROSCOPIC CNT: SIGNIFICANT CHANGE UP
PLATELET # BLD AUTO: 170 K/UL — SIGNIFICANT CHANGE UP (ref 130–400)
POTASSIUM SERPL-MCNC: 3.2 MMOL/L — LOW (ref 3.5–5)
POTASSIUM SERPL-MCNC: 3.7 MMOL/L — SIGNIFICANT CHANGE UP (ref 3.5–5)
POTASSIUM SERPL-SCNC: 3.2 MMOL/L — LOW (ref 3.5–5)
POTASSIUM SERPL-SCNC: 3.7 MMOL/L — SIGNIFICANT CHANGE UP (ref 3.5–5)
PROT SERPL-MCNC: 6.1 G/DL — SIGNIFICANT CHANGE UP (ref 6–8)
RBC # BLD: 3.89 M/UL — LOW (ref 4.2–5.4)
RBC # FLD: 13 % — SIGNIFICANT CHANGE UP (ref 11.5–14.5)
SODIUM SERPL-SCNC: 126 MMOL/L — LOW (ref 135–146)
SODIUM SERPL-SCNC: 127 MMOL/L — LOW (ref 135–146)
SPECIMEN SOURCE: SIGNIFICANT CHANGE UP
WBC # BLD: 5.58 K/UL — SIGNIFICANT CHANGE UP (ref 4.8–10.8)
WBC # FLD AUTO: 5.58 K/UL — SIGNIFICANT CHANGE UP (ref 4.8–10.8)

## 2022-08-09 PROCEDURE — 99232 SBSQ HOSP IP/OBS MODERATE 35: CPT

## 2022-08-09 PROCEDURE — 99497 ADVNCD CARE PLAN 30 MIN: CPT | Mod: 25

## 2022-08-09 PROCEDURE — 99223 1ST HOSP IP/OBS HIGH 75: CPT

## 2022-08-09 RX ORDER — MAGNESIUM SULFATE 500 MG/ML
2 VIAL (ML) INJECTION ONCE
Refills: 0 | Status: COMPLETED | OUTPATIENT
Start: 2022-08-09 | End: 2022-08-09

## 2022-08-09 RX ORDER — POTASSIUM CHLORIDE 20 MEQ
40 PACKET (EA) ORAL ONCE
Refills: 0 | Status: COMPLETED | OUTPATIENT
Start: 2022-08-09 | End: 2022-08-09

## 2022-08-09 RX ORDER — POTASSIUM CHLORIDE 20 MEQ
20 PACKET (EA) ORAL
Refills: 0 | Status: DISCONTINUED | OUTPATIENT
Start: 2022-08-09 | End: 2022-08-09

## 2022-08-09 RX ORDER — POTASSIUM CHLORIDE 20 MEQ
20 PACKET (EA) ORAL ONCE
Refills: 0 | Status: DISCONTINUED | OUTPATIENT
Start: 2022-08-09 | End: 2022-08-09

## 2022-08-09 RX ORDER — SODIUM CHLORIDE 9 MG/ML
1000 INJECTION INTRAMUSCULAR; INTRAVENOUS; SUBCUTANEOUS
Refills: 0 | Status: DISCONTINUED | OUTPATIENT
Start: 2022-08-09 | End: 2022-08-09

## 2022-08-09 RX ADMIN — HEPARIN SODIUM 5000 UNIT(S): 5000 INJECTION INTRAVENOUS; SUBCUTANEOUS at 17:48

## 2022-08-09 RX ADMIN — Medication 40 MILLIEQUIVALENT(S): at 18:44

## 2022-08-09 RX ADMIN — SODIUM CHLORIDE 50 MILLILITER(S): 9 INJECTION INTRAMUSCULAR; INTRAVENOUS; SUBCUTANEOUS at 11:51

## 2022-08-09 RX ADMIN — Medication 25 GRAM(S): at 14:34

## 2022-08-09 RX ADMIN — CEFTRIAXONE 100 MILLIGRAM(S): 500 INJECTION, POWDER, FOR SOLUTION INTRAMUSCULAR; INTRAVENOUS at 06:04

## 2022-08-09 RX ADMIN — HEPARIN SODIUM 5000 UNIT(S): 5000 INJECTION INTRAVENOUS; SUBCUTANEOUS at 06:09

## 2022-08-09 RX ADMIN — Medication 50 MILLIEQUIVALENT(S): at 11:38

## 2022-08-09 NOTE — SWALLOW BEDSIDE ASSESSMENT ADULT - DIET PRIOR TO ADMI
Regular diet w/ thin liquids (as per Pt's daughter)
Regular diet w/ thin liquids (as per Pt's daughter)

## 2022-08-09 NOTE — PROGRESS NOTE ADULT - ASSESSMENT
Patient examined and discussed with MS earlier this AM during rounds.  F/up of this 89 year old female with a history of HTN, HLD, [place], S/p R hip surgery  who presented to ED from NH yesterday with change in mental status.  Noted + fever with ?cough reported.  Found to be febrile with hyponatremic with Na of 123 and suspicious UA.    Acute Metabolic encephalopathy:  Mentation is much improved today; ?back to baseline.  Likely change in mentation d/t infection and electrolyte abnormalities.  Will continue to monitor.  Sepsis d/t UTI with + urine cs >100,000 E.coli.   To continue IV Ceftriaxone pending ID and sensitivity of E.coli.  Bl cs pending, to f/up.  F/up vitals and abn labs.  Chest CT is negative; doubt PNA.  Will dc IV Azithromycin (already rec'd 2 doses).  Legionnella studies pending; will f/up.  Hyponatremia:  improved with Na now at 127.  ?SIADH.  F/up TSH and AM cortisol.  Pt was taking valsartan-HCTZ 160-25 mg QD.  HCTZ could also contribute to hyponatremia.  Med currently on hold.  Hypokalemia with K of 3.2.  Will give 1 dose of K supplement and f/up.  Renal dysfunction with dec eGFR of 47 ?acute vs. chronic vs acute on chronic.  Will monitor.  PT evaluation, fall precaution and dvt prophylaxis.    Anticipate dc in 2 days.

## 2022-08-09 NOTE — SWALLOW BEDSIDE ASSESSMENT ADULT - SLP PRECAUTIONS/LIMITATIONS: HEARING
R hearing aid in place/impaired/assistive device in use
R hearing aid in place/impaired/assistive device in use

## 2022-08-09 NOTE — PROGRESS NOTE ADULT - SUBJECTIVE AND OBJECTIVE BOX
YOMILOREE  89y, Female  Allergy: No Known Allergies      OVERNIGHT EVENTS:  None reported    PAST MEDICAL & SURGICAL HISTORY:  HTN (hypertension)  Femur neck fracture- right  History of hip surgery  Right     VITALS:  T(F): 97.4 (22 @ 14:21), Max: 99 (22 @ 15:38)  HR: 84 (22 @ 14:21)  BP: 131/59 (22 @ 14:21) (126/59 - 171/68)  RR: 18 (22 @ 14:21)  SpO2: 99% (22 @ 14:21)    TESTS & MEASUREMENTS:  Height (cm): 152.4 (22 @ 14:59)  Weight (kg): 48 (22 @ 21:30)  BMI (kg/m2): 20.7 (22 @ 21:30)    22 @ 07:01  -  22 @ 07:00  --------------------------------------------------------  IN: 0 mL / OUT: 925 mL / NET: -925 mL    PHYSICAL EXAM:    Pt is [place] and voice amplifier is on.  Looks comfortable, not in distress.  No pain reported.    Awake and alert and f/up on commands.  Neck supple.  Chest CTA b/l.  Cor Reg S1S2  Abd + BS, soft and NT  + Loss of subcutaneous fat tricep regions  Moving all extremities                         11.5   5.58  )-----------( 170      ( 09 Aug 2022 06:26 )             32.6           127<L>  |  89<L>  |  28<H>  ----------------------------<  80  3.2<L>   |  25  |  1.1    Ca    8.8      09 Aug 2022 06:26  Mg     1.6         TPro  6.1  /  Alb  3.3<L>  /  TBili  0.2  /  DBili  x   /  AST  34  /  ALT  10  /  AlkPhos  53      LIVER FUNCTIONS - ( 09 Aug 2022 06:26 )  Alb: 3.3 g/dL / Pro: 6.1 g/dL / ALK PHOS: 53 U/L / ALT: 10 U/L / AST: 34 U/L / GGT: x           CARDIAC MARKERS ( 07 Aug 2022 17:01 )  x     / <0.01 ng/mL / x     / x     / x        Culture - Urine (collected 22 @ 17:01)  Source: Clean Catch Clean Catch (Midstream)  Preliminary Report (22 @ 20:21):    >100,000 CFU/ml Escherichia coli    Culture - Blood (collected 22 @ 17:01)  Source: .Blood Blood-Peripheral  Preliminary Report (22 @ 01:03):    No growth to date.    Culture - Blood (collected 22 @ 17:01)  Source: .Blood Blood-Peripheral  Preliminary Report (22 @ 01:03):    No growth to date.      Urinalysis Basic - ( 07 Aug 2022 22:50 )    Color: Light Orange / Appearance: Turbid / S.020 / pH: x  Gluc: x / Ketone: Trace  / Bili: Negative / Urobili: <2 mg/dL   Blood: x / Protein: 100 mg/dL / Nitrite: Positive   Leuk Esterase: Large / RBC: >720 /HPF /  /HPF   Sq Epi: x / Non Sq Epi: 2 /HPF / Bacteria: Negative      MEDICATIONS:  MEDICATIONS  (STANDING):  azithromycin  IVPB      azithromycin  IVPB 500 milliGRAM(s) IV Intermittent every 24 hours  cefTRIAXone   IVPB 1000 milliGRAM(s) IV Intermittent every 24 hours  heparin   Injectable 5000 Unit(s) SubCutaneous every 12 hours  potassium chloride  20 mEq/100 mL IVPB 20 milliEquivalent(s) IV Intermittent every 2 hours  sodium chloride 0.9%. 1000 milliLiter(s) (50 mL/Hr) IV Continuous <Continuous>    MEDICATIONS  (PRN):  acetaminophen     Tablet .. 650 milliGRAM(s) Oral every 6 hours PRN Temp greater or equal to 38.5C (101.3F)

## 2022-08-09 NOTE — PHYSICAL THERAPY INITIAL EVALUATION ADULT - IMPAIRMENTS FOUND, PT EVAL
aerobic capacity/endurance/arousal, attention, and cognition/fine motor/gait, locomotion, and balance/gross motor/muscle strength/poor safety awareness

## 2022-08-09 NOTE — CONSULT NOTE ADULT - PROBLEM SELECTOR RECOMMENDATION 3
Pt mikalal. Pt francisco. Plan for rehab at SNF Hyponatremic, improving. mental status improving. Baseline pt is forgetful with mild/moderate dementia  - Labs, electrolyte monitoring and replacement as ordered

## 2022-08-09 NOTE — PROGRESS NOTE ADULT - ATTENDING COMMENTS
Patient examined and discussed with student.  No further fever noted and mentation back to ?baseline.  Hyponatremia correcting.  + Hypokalemia noted and supplement ordered.  Clinically pt is improved and to f/up urine / blood c/s results.  To adjust abx coverage and potential dc in 1 to 2 days back to NH.  Please refer to medical attending note for further input.

## 2022-08-09 NOTE — CONSULT NOTE ADULT - PROBLEM SELECTOR RECOMMENDATION 2
Lester   IV ABT Lester  azithromycin  IVPB 500 milliGRAM(s) IV Intermittent every 24 hours  cefTRIAXone   IVPB 1000 milliGRAM(s) IV Intermittent every 24 hours  - Ongoing medical management Pt francisco. Plan for rehab at SNF

## 2022-08-09 NOTE — CONSULT NOTE ADULT - PROBLEM SELECTOR RECOMMENDATION 9
DNR/DNI now  Son Rohan and daughter Alma in agreement Lester  azithromycin  IVPB 500 milliGRAM(s) IV Intermittent every 24 hours  cefTRIAXone   IVPB 1000 milliGRAM(s) IV Intermittent every 24 hours  - Ongoing medical management  - high risk, monitor counts

## 2022-08-09 NOTE — PROGRESS NOTE ADULT - SUBJECTIVE AND OBJECTIVE BOX
LOREE CELAYA 89y Female  MRN#: [id number]      Pt is currently admitted with the primary diagnosis of AMS and fever.      Hospital Day: 2d  CC: "was not herself"  HPI: Patient is a 89 yo F with PMH of HTN and HLD who presents to the ED from [facility] with AMS and fever. As per son, pt was noted to be not like herself, as she did not recognize her daughter at the NH and also spiked a fever to 103F. At baseline, pt is AAOx2, but has had progressive functional and cognitive decline x a few months. She will forget names, forget year/president, and needs help dressing and using the restroom, but knows self and kids, knows location. Daughter has not noted cough, SOB, vomiting, or malodorous urine, however as per patient has had cough.       Hospital Course:  In the ED, pt was only minimally responsive and only gave name.  Was given dose of cefepime and 1 L bolus NS.  Na 123, BNP 3300, UA with small LE, WBC 15, no bacteria.  EKG showed sinus rhythm with premature SVCs  CXR showed pleural-based density in the left lower lobe.     Overnight events:      Subjective complaints:  Does not endorse pain.     Present Today:   - L[place]:  No [  ], Yes [ X ] :                                           ----------------------------------------------------------    PAST MEDICAL & SURGICAL HISTORY  HTN (hypertension)    Femur neck fracture  right    History of hip surgery  Right                                               -----------------------------------------------------------  ALLERGIES:  No Known Allergies                                            ------------------------------------------------------------    HOME MEDICATIONS  Home Medications:  Metoprolol Succinate ER 25 mg oral tablet, extended release: 1 tab(s) orally once a day (13 Mar 2022 12:56)  valsartan-hydrochlorothiazide 160 mg-25 mg oral tablet: 1 tab(s) orally once a day (13 Mar 2022 12:56)                           MEDICATIONS:  STANDING MEDICATIONS  azithromycin  IVPB      azithromycin  IVPB 500 milliGRAM(s) IV Intermittent every 24 hours  cefTRIAXone   IVPB 1000 milliGRAM(s) IV Intermittent every 24 hours  heparin   Injectable 5000 Unit(s) SubCutaneous every 12 hours  magnesium sulfate  IVPB 2 Gram(s) IV Intermittent once  potassium chloride  20 mEq/100 mL IVPB 20 milliEquivalent(s) IV Intermittent every 2 hours  sodium chloride 0.9%. 1000 milliLiter(s) IV Continuous <Continuous>    PRN MEDICATIONS  acetaminophen     Tablet .. 650 milliGRAM(s) Oral every 6 hours PRN                                            ------------------------------------------------------------  VITAL SIGNS: Last 24 Hours  T(C): 36.8 (09 Aug 2022 05:00), Max: 37.2 (08 Aug 2022 15:38)  T(F): 98.2 (09 Aug 2022 05:00), Max: 99 (08 Aug 2022 15:38)  HR: 83 (09 Aug 2022 05:00) (57 - 83)  BP: 126/59 (09 Aug 2022 05:00) (126/59 - 171/68)  BP(mean): --  RR: 18 (09 Aug 2022 05:00) (18 - 19)  SpO2: 100% (08 Aug 2022 21:30) (97% - 100%)      22 @ 07:01  -  22 @ 07:00  --------------------------------------------------------  IN: 0 mL / OUT: 925 mL / NET: -925 mL                                             --------------------------------------------------------------  LABS:                        11.5   5.58  )-----------( 170      ( 09 Aug 2022 06:26 )             32.6     08-    127<L>  |  89<L>  |  28<H>  ----------------------------<  80  3.2<L>   |  25  |  1.1    Ca    8.8      09 Aug 2022 06:26  Mg     1.6     0809    TPro  6.1  /  Alb  3.3<L>  /  TBili  0.2  /  DBili  x   /  AST  34  /  ALT  10  /  AlkPhos  53  08-09      Urinalysis Basic - ( 07 Aug 2022 22:50 )    Color: Light Orange / Appearance: Turbid / S.020 / pH: x  Gluc: x / Ketone: Trace  / Bili: Negative / Urobili: <2 mg/dL   Blood: x / Protein: 100 mg/dL / Nitrite: Positive   Leuk Esterase: Large / RBC: >720 /HPF /  /HPF   Sq Epi: x / Non Sq Epi: 2 /HPF / Bacteria: Negative              Culture - Urine (collected 07 Aug 2022 17:01)  Source: Clean Catch Clean Catch (Midstream)  Preliminary Report (08 Aug 2022 20:21):    >100,000 CFU/ml Escherichia coli    Culture - Blood (collected 07 Aug 2022 17:01)  Source: .Blood Blood-Peripheral  Preliminary Report (09 Aug 2022 01:03):    No growth to date.    Culture - Blood (collected 07 Aug 2022 17:01)  Source: .Blood Blood-Peripheral  Preliminary Report (09 Aug 2022 01:03):    No growth to date.        CARDIAC MARKERS ( 07 Aug 2022 17:01 )  x     / <0.01 ng/mL / x     / x     / x                                                  -------------------------------------------------------------  RADIOLOGY:  < from: Xray Chest 1 View- PORTABLE-Urgent (Xray Chest 1 View- PORTABLE-Urgent .) (22 @ 18:53) >  Impression:    Pleural-based density within the left lower lung. Prominence interstitial   markings. Correlate with CAT scan of the thorax.    < end of copied text >            < from: CT Head No Cont (22 @ 03:57) >  IMPRESSION:      No evidence of acute intracranial pathology.    Chronic microvascular ischemic changes.    < end of copied text >            < from: CT Chest No Cont (22 @ 17:42) >  IMPRESSION:  No focal consolidation to suggest pneumonia      < end of copied text >                                            --------------------------------------------------------------    PHYSICAL EXAM:  General: well-appearing woman sitting up in bed in NAD. Has headphones on because hard-of-hearing.   HEENT: Normocephalic, nontraumatic.   LUNGS: Clear to auscultation b/l. No wheezes, rales, or rhonchi.  HEART: RRR. No murmurs, rubs, or gallops.  ABDOMEN: Nontender, nondistended. + bowel sounds.  EXT: Pulses palpable x 4. Nonedematous. Stiff LE and winces with palpation of the RLE.  SKIN: Warm, dry.

## 2022-08-09 NOTE — SWALLOW BEDSIDE ASSESSMENT ADULT - SLP GENERAL OBSERVATIONS
Pt. received awake, alert, ox1 (self), follows commands, room air.
Pt. received awake, confused, min verbal output, doesn't follow commands, room air. Accompanied by daughter at bedside.

## 2022-08-09 NOTE — SWALLOW BEDSIDE ASSESSMENT ADULT - COMMENTS
RN reports improved alertness.
Pt's daughter reports Pt. was independent prior to admin, communicating wants/needs w/ family, tolerating a regular diet w/ thin liquids, no h/o dysphagia.

## 2022-08-09 NOTE — SWALLOW BEDSIDE ASSESSMENT ADULT - SWALLOW EVAL: DIAGNOSIS
Mod/severe oral dysphagia with puree, no acceptance for puree trials likely impacted by cognition + tolerance and acceptance for small amounts of thin liquids without overt s/s of penetration/ aspiration.
Mild oral dysphagia with easy to chew, + tolerance for easy to chew, soft & bite sized and thin liquids without overt s/s of penetration/ aspiration.

## 2022-08-09 NOTE — SWALLOW BEDSIDE ASSESSMENT ADULT - CONSISTENCIES ADMINISTERED
3oz water, 3oz easy to chew, 2oz soft & bite sized/thin liquid/soft & bite-sized/easy to chew
4 small sips of water ~1oz/thin liquid

## 2022-08-09 NOTE — CONSULT NOTE ADULT - PROBLEM SELECTOR RECOMMENDATION 4
Hyponatremic, improving Hyponatremic, improving. mental status improving. Baseline pt is forgetful with mild/moderate dementia  - Labs, electrolyte monitoring and replacement as ordered see above

## 2022-08-09 NOTE — CONSULT NOTE ADULT - ASSESSMENT
89yFemale being evaluated for goals of care and symptom management. Pt appears comfortable, denies pain. Being seen by PT now. Has barker cath, on IV ABT.     Case d/w attending       MEDD (morphine equivalent daily dose):0      See Recs below.    Please call [phone] with questions or concerns 24/7.   We will continue to follow.   
ASSESSMENT  89y F admitted with METABOLIC ENCEPHALOPATHY; FEVER; HYPONATREMIA    HPI:  89 year old female with a history of HTN and HLD presents to the ED from [facility] with altered mental status and fever. On presentation, pt minimally responsive, only gives name, unable to collect history. Spoke with son Rohan over the phone. Pt was noted to be not herself, not recognizing her daughter at the NH, also spiked a fever at NH to 103 so brought to ED. As per son, at baseline pt ~AAO*2, has had progressive functional/cognitive decline over the last few months, sometimes forgets names but knows herself and kids, usually knows where she is, likely not know year/president, walks with walker, needs help dressing/bathroom. Son lives in [place], his sister lives on  and visits mother daily. As per ED note, Daughter has not noted cough, shortness of breath, vomiting, malodourous urine.    PROBLEMS  #Severe Sepsis (T>101F, metabolic encephalopathy, due to suspected UTI (    R/O gram negative pneumonia    2nd U/A Nitrite: Positive /Leuk Esterase: Large / RBC: >720 /HPF /  /HPF  / Bacteria: Negative    New problem with additional W/U  acute illness with systemic symptoms     On cefTRIAXone   IVPB 1000 milliGRAM(s) IV Intermittent every 24 hours    #Hyponatremia  Na 127    #Metabolic encephalopathy    PLAN  - Await blood cultures, urine culture  - Await official Cxray result  - Continue Ceftriaxone for now  - Repeat sodium, wbc

## 2022-08-09 NOTE — PROGRESS NOTE ADULT - ASSESSMENT
89 yo F with HTN and HLD who presents from [facility] with AMS, cough, and fever.    #Sepsis  #Metabolic encephalopathy  #Suspected GNR pneumonia and/or UTI UA with hematuria  - CXR 8/7 showed pleural-based density within the left lower lung  - CT chest showed no focal consolidation  - c/w ceftriaxone 1g q24h  - c/w azithromycin 500 mg qd  - Strep/legionella urine antigen  - f/u BCx, UCx  - consider abx to cover nosocomial PNA bugs considering pt from NH, as per Dr. 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  - r/o hypothyroidism, cortisol deficiency  - monitor BMP  - hold IVF for now, diet recs appreciated: thin liquids and easy to chew    #HTN/HLD   - oral meds being held due to encephalopathy        GI PPX: none  DVT PPX: heparin subQ   DIET: easy to chew, thin liquids  ACTIVITY:   CODE:   DISPO: from [facility]      ________________________________________________  Provider handoff:  [] f/u BCx, UCx  [] c/w IV abx  [] f/u Strep, Legionella antigen  [] to subacute rehab as per PT 89 yo F with HTN and HLD who presents from [facility] with AMS, cough, and fever.    #Sepsis  #Metabolic encephalopathy  #Suspected GNR pneumonia and/or UTI UA with hematuria  - CXR 8/7 showed pleural-based density within the left lower lung  - CT chest showed no focal consolidation  - c/w ceftriaxone 1g q24h  - c/w azithromycin 500 mg qd  - Strep/legionella urine antigen  - f/u BCx, UCx  - consider abx to cover nosocomial PNA bugs considering pt from NH, as per Dr. 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

## 2022-08-09 NOTE — SWALLOW BEDSIDE ASSESSMENT ADULT - SLP PERTINENT HISTORY OF CURRENT PROBLEM
89 year old female with a history of HTN and HLD presents to the ED from [facility] with altered mental status and fever. Pt. with functional/cognitive decline over the last few months. Admitted with severe Sepsis (T>101F, metabolic encephalopathy, due to suspected UTI. CTH negative.
89 year old female with a history of HTN and HLD presents to the ED from [facility] with altered mental status and fever. Pt. with functional/cognitive decline over the last few months. Admitted with severe Sepsis (T>101F, metabolic encephalopathy, due to suspected UTI. CTH negative.

## 2022-08-09 NOTE — CONSULT NOTE ADULT - CONVERSATION DETAILS
Palliative care introduced    Discussed mom's overall condition and treatment. Pt is a long term SNF resident. She has had some decline in the last year. Son and pt's daughter Alma agree that they want to  put in place a DNR/DNI.     Otherwise mom is responding to medical treatment. She is starting to eat by mouth with assistance again. She was weak initially. Now improved. Did discuss MOLST form and that if mom were to get sicker we could discuss limiting care further

## 2022-08-09 NOTE — CONSULT NOTE ADULT - NS ATTEND AMEND GEN_ALL_CORE FT
Agree with above, patient comfortable, c/w IV abx, likely dispo back to SNF, patient now DNR/DNI  ______________  Jadiel Duque MD  Palliative Medicine  [facility]   of Geriatric and Palliative Medicine  [phone]

## 2022-08-09 NOTE — CONSULT NOTE ADULT - SUBJECTIVE AND OBJECTIVE BOX
HPI:  89 year old female with a history of HTN and HLD presents to the ED from [facility] with altered mental status and fever. On presentation, pt minimally responsive, only gives name, unable to collect history. Spoke with son Rohan over the phone. Pt was noted to be not herself, not recognizing her daughter at the NH, also spiked a fever at NH to 103 so brought to ED. As per son, at baseline pt ~AAO*2, has had progressive functional/cognitive decline over the last few months, sometimes forgets names but knows herself and kids, usually knows where she is, likely not know year/president, walks with walker, needs help dressing/bathroom. Son lives in [place], his sister lives on  and visits mother daily. As per ED note, Daughter has not noted cough, shortness of breath, vomiting, malodourous urine.    In the ED, pt given a dose of cefipime and 1L bolus NS  Vitals: T(F): .3 HR: 85 BP: 132/60 RR: 20SpO2: 96%  Labs: significant for Na 123, bnp 3300, Ua with small LE, wbc 15, no bacteria  ECG: Sinus rhythm with Premature supraventricular complexes  CXR: unread, on wet read questionable Left cpa opacity/effusion     Pt admitted for ams and fever     (07 Aug 2022 23:31)    PERTINENT PM/SXH:   HTN (hypertension)    Femur neck fracture      History of hip surgery      FAMILY HISTORY:  Patient unable to provide medical history      ITEMS NOT CHECKED ARE NOT PRESENT    SOCIAL HISTORY:   Significant other/partner[ ]  Children[ ]  Moravian/Spirituality:  Substance hx:  [ ]   Tobacco hx:  [ ]   Alcohol hx: [ ]   Living Situation: [ ]Home  [ ]Long term care  [ ]Rehab [ ]Other  Home Services: [ ] HHA [ ] Visting RN [ ] Hospice  Occupation:  Home Opioid hx:  [ ] Y [ ] N [ ] I-Stop Reference No:    ADVANCE DIRECTIVES:    DNR  MOLST  [ ]  Living Will  [ ]   DECISION MAKER(s):  [ ] Health Care Proxy(s)  [ ] Surrogate(s)  [ ] Guardian           Name(s): Phone Number(s):    BASELINE (I)ADL(s) (prior to admission):  [place]: [ ]Total  [ ] Moderate [ ]Dependent  Palliative Performance Status Version 2:         %    http://npcrc.org/files/news/palliative_performance_scale_ppsv2.pdf    Allergies    No Known Allergies    Intolerances    MEDICATIONS  (STANDING):  azithromycin  IVPB      azithromycin  IVPB 500 milliGRAM(s) IV Intermittent every 24 hours  cefTRIAXone   IVPB 1000 milliGRAM(s) IV Intermittent every 24 hours  heparin   Injectable 5000 Unit(s) SubCutaneous every 12 hours  magnesium sulfate  IVPB 2 Gram(s) IV Intermittent once  potassium chloride  20 mEq/100 mL IVPB 20 milliEquivalent(s) IV Intermittent every 2 hours  sodium chloride 0.9%. 1000 milliLiter(s) (50 mL/Hr) IV Continuous <Continuous>    MEDICATIONS  (PRN):  acetaminophen     Tablet .. 650 milliGRAM(s) Oral every 6 hours PRN Temp greater or equal to 38.5C (101.3F)    PRESENT SYMPTOMS: [ ]Unable to obtain due to poor mentation   Source if other than patient:  [ ]Family   [ ]Team     Pain: [ ]yes [ ]no  QOL impact -   Location -                    Aggravating factors -  Quality -  Radiation -  Timing-  Severity (0-10 scale):  Minimal acceptable level (0-10 scale):     CPOT:    https://www.[facility].org/getattachment/[email or web address]/Critical-Care-Pain-Observation-Tool-(CPOT)      PAIN AD Score:     http://geriatrictoolkit.[facility]/cog/painad.pdf (press ctrl +  left click to view)    Dyspnea:                           [ ]Mild [ ]Moderate [ ]Severe  Anxiety:                             [ ]Mild [ ]Moderate [ ]Severe  Fatigue:                             [ ]Mild [ ]Moderate [ ]Severe  Nausea:                             [ ]Mild [ ]Moderate [ ]Severe  Loss of appetite:              [ ]Mild [ ]Moderate [ ]Severe  Constipation:                    [ ]Mild [ ]Moderate [ ]Severe    Other Symptoms:  [ ]All other review of systems negative     Palliative Performance Status Version 2:         %    http://npcrc.org/files/news/palliative_performance_scale_ppsv2.pdf  PHYSICAL EXAM:  Vital Signs Last 24 Hrs  T(C): 36.8 (09 Aug 2022 05:00), Max: 37.2 (08 Aug 2022 15:38)  T(F): 98.2 (09 Aug 2022 05:00), Max: 99 (08 Aug 2022 15:38)  HR: 83 (09 Aug 2022 05:00) (57 - 83)  BP: 126/59 (09 Aug 2022 05:00) (126/59 - 171/68)  BP(mean): --  RR: 18 (09 Aug 2022 05:00) (18 - 19)  SpO2: 100% (08 Aug 2022 21:30) (97% - 100%)    Parameters below as of 08 Aug 2022 21:30  Patient On (Oxygen Delivery Method): room air     I&O's Summary    08 Aug 2022 07:01  -  09 Aug 2022 07:00  --------------------------------------------------------  IN: 0 mL / OUT: 925 mL / NET: -925 mL      GENERAL:  [ ]Alert  [x ]Oriented x 1  [ ]Lethargic  [ ]Cachexia  [ ]Unarousable  [ x]Verbal  [ ]Non-Verbal  Behavioral:   [ ] Anxiety  [ ] Delirium [ ] Agitation [ ] Other  HEENT:  [x ]Normal   [ ]Dry mouth   [ ]ET Tube/Trach  [ ]Oral lesions  PULMONARY:   [x ]Clear [ ]Tachypnea  [ ]Audible excessive secretions   [ ]Rhonchi        [ ]Right [ ]Left [ ]Bilateral  [ ]Crackles        [ ]Right [ ]Left [ ]Bilateral  [ ]Wheezing     [ ]Right [ ]Left [ ]Bilateral  [ ]Diminished breath sounds [ ]right [ ]left [ ]bilateral  CARDIOVASCULAR:    [ ]Regular [ ]Irregular [ ]Tachy  [ ]Jordy [ ]Murmur [ ]Other  GASTROINTESTINAL:  [x ]Soft  [ ]Distended   [ ]+BS  [ ]Non tender [ ]Tender  [ ]PEG [ ]OGT/ NGT  Last BM:   GENITOURINARY:  [ ]Normal [ ] Incontinent   [ ]Oliguria/Anuria   [x ]Lester  MUSCULOSKELETAL:   [ ]Normal   [x ]Weakness  [ ]Bed/Wheelchair bound [ ]Edema  NEUROLOGIC:   [ ]No focal deficits  [ x]Cognitive impairment  [ ]Dysphagia [ ]Dysarthria [ ]Paresis [ ]Other   SKIN: blanchable redness   [ ]Normal    [ ]Rash  [ ]Pressure ulcer(s)       Present on admission [ x]y [ ]n    LABS:                        11.5   5.58  )-----------( 170      ( 09 Aug 2022 06:26 )             32.6       127<L>  |  89<L>  |  28<H>  ----------------------------<  80  3.2<L>   |  25  |  1.1    Ca    8.8      09 Aug 2022 06:26  Mg     1.6         TPro  6.1  /  Alb  3.3<L>  /  TBili  0.2  /  DBili  x   /  AST  34  /  ALT  10  /  AlkPhos  53        Urinalysis Basic - ( 07 Aug 2022 22:50 )    Color: Light Orange / Appearance: Turbid / S.020 / pH: x  Gluc: x / Ketone: Trace  / Bili: Negative / Urobili: <2 mg/dL   Blood: x / Protein: 100 mg/dL / Nitrite: Positive   Leuk Esterase: Large / RBC: >720 /HPF /  /HPF   Sq Epi: x / Non Sq Epi: 2 /HPF / Bacteria: Negative      RADIOLOGY & ADDITIONAL STUDIES:    PROTEIN CALORIE MALNUTRITION PRESENT: [ ]mild [ ]moderate [ ]severe [ ]underweight [ ]morbid obesity  https://www.andeal.org/vault/2440/web/files/ONC/Table_Clinical%20Characteristics%20to%20Document%20Malnutrition-White%20JV%20et%20al%2020.pdf    Height (cm): 152.4 (22 @ 14:59), 152.4 (22 @ 20:00)  Weight (kg): 48 (22 @ 21:30), 55.4 (22 @ 20:00)  BMI (kg/m2): 20.7 (22 @ 21:30), 23.9 (22 @ 14:59), 23.9 (22 @ 20:00)    [ ]PPSV2 < or = to 30% [ ]significant weight loss  [ ]poor nutritional intake  [ ]anasarca      [ ]Artificial Nutrition      REFERRALS:   [ ]Chaplaincy  [ ]Hospice  [ ]Child Life  [ ]Social Work  [ ]Case management [ ]Holistic Therapy     Goals of Care Document:        HPI:  89 year old female with a history of HTN and HLD presents to the ED from [facility] with altered mental status and fever. On presentation, pt minimally responsive, only gives name, unable to collect history. Spoke with son Rohan over the phone. Pt was noted to be not herself, not recognizing her daughter at the NH, also spiked a fever at NH to 103 so brought to ED. As per son, at baseline pt ~AAO*2, has had progressive functional/cognitive decline over the last few months, sometimes forgets names but knows herself and kids, usually knows where she is, likely not know year/president, walks with walker, needs help dressing/bathroom. Son lives in [place], his sister lives on  and visits mother daily. As per ED note, Daughter has not noted cough, shortness of breath, vomiting, malodourous urine.    In the ED, pt given a dose of cefipime and 1L bolus NS  Vitals: T(F): .3 HR: 85 BP: 132/60 RR: 20SpO2: 96%  Labs: significant for Na 123, bnp 3300, Ua with small LE, wbc 15, no bacteria  ECG: Sinus rhythm with Premature supraventricular complexes  CXR: unread, on wet read questionable Left cpa opacity/effusion     Pt admitted for ams and fever     (07 Aug 2022 23:31)    PERTINENT PM/SXH:   HTN (hypertension)    Femur neck fracture      History of hip surgery      FAMILY HISTORY:  Patient unable to provide medical history      ITEMS NOT CHECKED ARE NOT PRESENT    SOCIAL HISTORY:   Significant other/partner[ ]  Children[ x]  Episcopal/Spirituality:  Substance hx:  [ ]   Tobacco hx:  [ ]   Alcohol hx: [ ]   Living Situation: [ ]Home  [ x]Long term care  [ ]Rehab [ ]Other  Home Services: [ ] HHA [ ] Dat RN [ ] Hospice  Occupation:  Home Opioid hx:  [ ] Y [ ] N [ ] I-Stop Reference No:    ADVANCE DIRECTIVES:    DNR  MOLST  [ ]  Living Will  [ ]   DECISION MAKER(s):  [ ] Health Care Proxy(s)  [x ] Surrogate(s)  [ ] Guardian           Name(s): Phone Number(s):  Rohan Richardson [phone]  Alma Knight [phone]    BASELINE (I)ADL(s) (prior to admission):  [place]: [ ]Total  [x ] Moderate [ ]Dependent  Palliative Performance Status Version 2:         %    http://[facility].org/files/news/palliative_performance_scale_ppsv2.pdf    Allergies    No Known Allergies    Intolerances    MEDICATIONS  (STANDING):  azithromycin  IVPB 500 milliGRAM(s) IV Intermittent every 24 hours  cefTRIAXone   IVPB 1000 milliGRAM(s) IV Intermittent every 24 hours  heparin   Injectable 5000 Unit(s) SubCutaneous every 12 hours  magnesium sulfate  IVPB 2 Gram(s) IV Intermittent once  potassium chloride  20 mEq/100 mL IVPB 20 milliEquivalent(s) IV Intermittent every 2 hours  sodium chloride 0.9%. 1000 milliLiter(s) (50 mL/Hr) IV Continuous <Continuous>    MEDICATIONS  (PRN):  acetaminophen     Tablet .. 650 milliGRAM(s) Oral every 6 hours PRN Temp greater or equal to 38.5C (101.3F)    PRESENT SYMPTOMS: [ ]Unable to obtain due to poor mentation   Source if other than patient:  [ ]Family   [ ]Team     Pain: [ ]yes [ x]no  QOL impact -   Location -                    Aggravating factors -  Quality -  Radiation -  Timing-  Severity (0-10 scale):  Minimal acceptable level (0-10 scale):     CPOT:    https://www.[facility].org/getattachment/[email or web address]/Critical-Care-Pain-Observation-Tool-(CPOT)      PAIN AD Score:     http://geriatrictoolkit.[facility]/cog/painad.pdf (press ctrl +  left click to view)    Dyspnea:                           [ ]Mild [ ]Moderate [ ]Severe  Anxiety:                             [ ]Mild [ ]Moderate [ ]Severe  Fatigue:                             [ ]Mild [ ]Moderate [ ]Severe  Nausea:                             [ ]Mild [ ]Moderate [ ]Severe  Loss of appetite:              [ ]Mild [ ]Moderate [ ]Severe  Constipation:                    [ ]Mild [ ]Moderate [ ]Severe    Other Symptoms:  [x ]All other review of systems negative     Palliative Performance Status Version 2:         %    http://[facility].org/files/news/palliative_performance_scale_ppsv2.pdf  PHYSICAL EXAM:  Vital Signs Last 24 Hrs  T(C): 36.8 (09 Aug 2022 05:00), Max: 37.2 (08 Aug 2022 15:38)  T(F): 98.2 (09 Aug 2022 05:00), Max: 99 (08 Aug 2022 15:38)  HR: 83 (09 Aug 2022 05:00) (57 - 83)  BP: 126/59 (09 Aug 2022 05:00) (126/59 - 171/68)  BP(mean): --  RR: 18 (09 Aug 2022 05:00) (18 - 19)  SpO2: 100% (08 Aug 2022 21:30) (97% - 100%)    Parameters below as of 08 Aug 2022 21:30  Patient On (Oxygen Delivery Method): room air     I&O's Summary    08 Aug 2022 07:01  -  09 Aug 2022 07:00  --------------------------------------------------------  IN: 0 mL / OUT: 925 mL / NET: -925 mL      GENERAL:  [ ]Alert  [x ]Oriented x 1  [ ]Lethargic  [ ]Cachexia  [ ]Unarousable  [ x]Verbal  [ ]Non-Verbal  Behavioral:   [ ] Anxiety  [ ] Delirium [ ] Agitation [ ] Other  HEENT:  [x ]Normal   [ ]Dry mouth   [ ]ET Tube/Trach  [ ]Oral lesions  PULMONARY:   [x ]Clear [ ]Tachypnea  [ ]Audible excessive secretions   [ ]Rhonchi        [ ]Right [ ]Left [ ]Bilateral  [ ]Crackles        [ ]Right [ ]Left [ ]Bilateral  [ ]Wheezing     [ ]Right [ ]Left [ ]Bilateral  [ ]Diminished breath sounds [ ]right [ ]left [ ]bilateral  CARDIOVASCULAR:    [ ]Regular [ ]Irregular [ ]Tachy  [ ]Jordy [ ]Murmur [ ]Other  GASTROINTESTINAL:  [x ]Soft  [ ]Distended   [ ]+BS  [ ]Non tender [ ]Tender  [ ]PEG [ ]OGT/ NGT  Last BM:   GENITOURINARY:  [ ]Normal [ ] Incontinent   [ ]Oliguria/Anuria   [x ]Lester  MUSCULOSKELETAL:   [ ]Normal   [x ]Weakness  [ ]Bed/Wheelchair bound [ ]Edema  NEUROLOGIC:   [ ]No focal deficits  [ x]Cognitive impairment  [ ]Dysphagia [ ]Dysarthria [ ]Paresis [ ]Other   SKIN: blanchable redness   [ ]Normal    [ ]Rash  [ ]Pressure ulcer(s)       Present on admission [ x]y [ ]n    LABS:                        11.5   5.58  )-----------( 170      ( 09 Aug 2022 06:26 )             32.6   08-09    127<L>  |  89<L>  |  28<H>  ----------------------------<  80  3.2<L>   |  25  |  1.1    Ca    8.8      09 Aug 2022 06:26  Mg     1.6     08-09    TPro  6.1  /  Alb  3.3<L>  /  TBili  0.2  /  DBili  x   /  AST  34  /  ALT  10  /  AlkPhos  53  08-      Urinalysis Basic - ( 07 Aug 2022 22:50 )    Color: Light Orange / Appearance: Turbid / S.020 / pH: x  Gluc: x / Ketone: Trace  / Bili: Negative / Urobili: <2 mg/dL   Blood: x / Protein: 100 mg/dL / Nitrite: Positive   Leuk Esterase: Large / RBC: >720 /HPF /  /HPF   Sq Epi: x / Non Sq Epi: 2 /HPF / Bacteria: Negative      RADIOLOGY & ADDITIONAL STUDIES:  < from: 12 Lead ECG (22 @ 18:19) >  Ventricular Rate 79 BPM    Atrial Rate 79 BPM    P-R Interval 198 ms    QRS Duration 78 ms    Q-T Interval 384 ms    QTC Calculation(Bazett) 440 ms    P Axis 84 degrees    R Axis -7 degrees    T Axis 5 degrees    Diagnosis Line Sinus rhythm with Premature supraventricular complexes  Nonspecific ST abnormality  Abnormal ECG    Confirmed by Mukesh Douglass ([id number]) on 2022 10:33:20 PM    < end of copied text >        REFERRALS:   [ ]Chaplaincy  [ ]Hospice  [ ]Child Life  [ x]Social Work  [x ]Case management [ ]Holistic Therapy     Goals of Care Document:

## 2022-08-09 NOTE — SWALLOW BEDSIDE ASSESSMENT ADULT - NS SPL SWALLOW CLINIC TRIAL FT
Mod/severe oral dysphagia with puree, characterized no PO acceptance for puree trials, and bolus holding for thin liquids likely impacted by cognition + tolerance abd acceptance for small amounts of thin liquids without overt s/s of penetration/ aspiration.
Mild oral dysphagia with easy to chew, + tolerance for easy to chew, soft & bite sized and thin liquids without overt s/s of penetration/ aspiration.

## 2022-08-09 NOTE — PHYSICAL THERAPY INITIAL EVALUATION ADULT - PERTINENT HX OF CURRENT PROBLEM, REHAB EVAL
89 year old female with a history of HTN and HLD presents to the ED from [facility] with altered mental status and fever. On presentation, pt minimally responsive, only gives name, unable to collect history. Spoke with son Rohan over the phone. Pt was noted to be not herself, not recognizing her daughter at the NH, also spiked a fever at NH to 103 so brought to ED. As per son, at baseline pt ~AAO*2, has had progressive functional/cognitive decline over the last few months, sometimes forgets names but knows herself and kids, usually knows where she is, likely not know year/president, walks with walker, needs help dressing/[id number] year old female with a history of HTN and HLD presents to the ED from [facility] with altered mental status and fever. On presentation, pt minimally responsive, only gives name, unable to collect history. Spoke with son Rohan over the phone. Pt was noted to be not herself, not recognizing her daughter at the NH, also spiked a fever at NH to 103 so brought to ED. As per son, at baseline pt ~AAO*2, has had progressive functional/cognitive decline over the last few months, sometimes forgets names but knows herself and kids, usually knows where she is, likely not know year/president, walks with walker, needs help dressing/[id number] year old female with a history of HTN and HLD presents to the ED from [facility] with altered mental status and fever. On presentation, pt minimally responsive, only gives name, unable to collect history. Spoke with son Rhoan over the phone. Pt was noted to be not herself, not recognizing her daughter at the NH, also spiked a fever at NH to 103 so brought to ED. As per son, at baseline pt ~AAO*2, has had progressive functional/cognitive decline over the last few months, sometimes forgets names but knows herself and kids, usually knows where she is, likely not know year/president, walks with walker, needs help dressing/bathroom

## 2022-08-09 NOTE — PHYSICAL THERAPY INITIAL EVALUATION ADULT - GENERAL OBSERVATIONS, REHAB EVAL
[id number] Pt received and left semifowlers in bed, NAD, pt agreeable to PT session +pocket talker +mj

## 2022-08-10 LAB
ALBUMIN SERPL ELPH-MCNC: 3.2 G/DL — LOW (ref 3.5–5.2)
ALP SERPL-CCNC: 53 U/L — SIGNIFICANT CHANGE UP (ref 30–115)
ALT FLD-CCNC: 10 U/L — SIGNIFICANT CHANGE UP (ref 0–41)
ANION GAP SERPL CALC-SCNC: 13 MMOL/L — SIGNIFICANT CHANGE UP (ref 7–14)
AST SERPL-CCNC: 38 U/L — SIGNIFICANT CHANGE UP (ref 0–41)
BASOPHILS # BLD AUTO: 0 K/UL — SIGNIFICANT CHANGE UP (ref 0–0.2)
BASOPHILS NFR BLD AUTO: 0 % — SIGNIFICANT CHANGE UP (ref 0–1)
BILIRUB SERPL-MCNC: 0.2 MG/DL — SIGNIFICANT CHANGE UP (ref 0.2–1.2)
BUN SERPL-MCNC: 25 MG/DL — HIGH (ref 10–20)
CALCIUM SERPL-MCNC: 8.9 MG/DL — SIGNIFICANT CHANGE UP (ref 8.5–10.1)
CHLORIDE SERPL-SCNC: 93 MMOL/L — LOW (ref 98–110)
CO2 SERPL-SCNC: 23 MMOL/L — SIGNIFICANT CHANGE UP (ref 17–32)
CREAT SERPL-MCNC: 1.2 MG/DL — SIGNIFICANT CHANGE UP (ref 0.7–1.5)
EGFR: 42 ML/MIN/1.73M2 — LOW
EOSINOPHIL # BLD AUTO: 0.01 K/UL — SIGNIFICANT CHANGE UP (ref 0–0.7)
EOSINOPHIL NFR BLD AUTO: 0.2 % — SIGNIFICANT CHANGE UP (ref 0–8)
GLUCOSE SERPL-MCNC: 80 MG/DL — SIGNIFICANT CHANGE UP (ref 70–99)
HCT VFR BLD CALC: 34.1 % — LOW (ref 37–47)
HGB BLD-MCNC: 11.7 G/DL — LOW (ref 12–16)
IMM GRANULOCYTES NFR BLD AUTO: 0.2 % — SIGNIFICANT CHANGE UP (ref 0.1–0.3)
LYMPHOCYTES # BLD AUTO: 0.95 K/UL — LOW (ref 1.2–3.4)
LYMPHOCYTES # BLD AUTO: 22.7 % — SIGNIFICANT CHANGE UP (ref 20.5–51.1)
MAGNESIUM SERPL-MCNC: 2.3 MG/DL — SIGNIFICANT CHANGE UP (ref 1.8–2.4)
MCHC RBC-ENTMCNC: 29.3 PG — SIGNIFICANT CHANGE UP (ref 27–31)
MCHC RBC-ENTMCNC: 34.3 G/DL — SIGNIFICANT CHANGE UP (ref 32–37)
MCV RBC AUTO: 85.3 FL — SIGNIFICANT CHANGE UP (ref 81–99)
MONOCYTES # BLD AUTO: 0.41 K/UL — SIGNIFICANT CHANGE UP (ref 0.1–0.6)
MONOCYTES NFR BLD AUTO: 9.8 % — HIGH (ref 1.7–9.3)
NEUTROPHILS # BLD AUTO: 2.8 K/UL — SIGNIFICANT CHANGE UP (ref 1.4–6.5)
NEUTROPHILS NFR BLD AUTO: 67.1 % — SIGNIFICANT CHANGE UP (ref 42.2–75.2)
NRBC # BLD: 0 /100 WBCS — SIGNIFICANT CHANGE UP (ref 0–0)
PLATELET # BLD AUTO: 157 K/UL — SIGNIFICANT CHANGE UP (ref 130–400)
POTASSIUM SERPL-MCNC: 5.1 MMOL/L — HIGH (ref 3.5–5)
POTASSIUM SERPL-SCNC: 5.1 MMOL/L — HIGH (ref 3.5–5)
PROT SERPL-MCNC: 6.3 G/DL — SIGNIFICANT CHANGE UP (ref 6–8)
RBC # BLD: 4 M/UL — LOW (ref 4.2–5.4)
RBC # FLD: 13 % — SIGNIFICANT CHANGE UP (ref 11.5–14.5)
SARS-COV-2 RNA SPEC QL NAA+PROBE: DETECTED
SODIUM SERPL-SCNC: 129 MMOL/L — LOW (ref 135–146)
WBC # BLD: 4.18 K/UL — LOW (ref 4.8–10.8)
WBC # FLD AUTO: 4.18 K/UL — LOW (ref 4.8–10.8)

## 2022-08-10 PROCEDURE — 99232 SBSQ HOSP IP/OBS MODERATE 35: CPT

## 2022-08-10 RX ORDER — CEFPODOXIME PROXETIL 100 MG
1 TABLET ORAL
Qty: 10 | Refills: 0
Start: 2022-08-10 | End: 2022-08-14

## 2022-08-10 RX ORDER — CEFPODOXIME PROXETIL 100 MG
200 TABLET ORAL EVERY 12 HOURS
Refills: 0 | Status: DISCONTINUED | OUTPATIENT
Start: 2022-08-10 | End: 2022-08-13

## 2022-08-10 RX ADMIN — Medication 200 MILLIGRAM(S): at 17:19

## 2022-08-10 RX ADMIN — CEFTRIAXONE 100 MILLIGRAM(S): 500 INJECTION, POWDER, FOR SOLUTION INTRAMUSCULAR; INTRAVENOUS at 05:26

## 2022-08-10 RX ADMIN — HEPARIN SODIUM 5000 UNIT(S): 5000 INJECTION INTRAVENOUS; SUBCUTANEOUS at 17:19

## 2022-08-10 RX ADMIN — HEPARIN SODIUM 5000 UNIT(S): 5000 INJECTION INTRAVENOUS; SUBCUTANEOUS at 05:28

## 2022-08-10 NOTE — DISCHARGE NOTE PROVIDER - NSDCCPCAREPLAN_GEN_ALL_CORE_FT
PRINCIPAL DISCHARGE DIAGNOSIS  Diagnosis: Metabolic encephalopathy  Assessment and Plan of Treatment: You came to the hospital because your daughter noticed a change in your mental status, and that you weren't acting like yourself. You also had a fever when you came to the hospital. You were found to have a UTI caused by E. Coli that was treated with azithromycin and a 3-day course of IV ceftriaxone. You will be going home on oral antibiotics.         SECONDARY DISCHARGE DIAGNOSES  Diagnosis: Fever  Assessment and Plan of Treatment: Fever  A fever is an increase in the body's temperature above 100.4°F (38°C) or higher. In adults and children older than three months, a brief mild or moderate fever generally has no long-term effect, and it usually does not require treatment. Many times, fevers are the result of viral infections, which are self-resolving.  However, certain symptoms or diagnostic tests may suggest a bacterial infection that may respond to antibiotics. Take medications as directed by your health care provider.  SEEK IMMEDIATE MEDICAL CARE IF YOU OR YOUR CHILD HAVE ANY OF THE FOLLOWING SYMPTOMS : shortness of breath, seizure, rash/stiff neck/headache, severe abdominal pain, persistent vomiting, any signs of dehydration, or if your child has a fever for over five (5) days.    Diagnosis: Hyponatremia  Assessment and Plan of Treatment: While you were in the hospital, your sodium levels were found to be low. The likely explanation for this was your use of your medication called hydrochlorothiazide (HCTZ). After being discharged from the hospital, discontinue use of HCTZ.        PRINCIPAL DISCHARGE DIAGNOSIS  Diagnosis: Metabolic encephalopathy  Assessment and Plan of Treatment: You came to the hospital because your daughter noticed a change in your mental status, and that you weren't acting like yourself. You also had a fever when you came to the hospital. You were found to have a UTI caused by E. Coli that was treated with azithromycin and a 3-day course of IV ceftriaxone. You will be going home on oral antibiotics.         SECONDARY DISCHARGE DIAGNOSES  Diagnosis: Fever  Assessment and Plan of Treatment: Fever  A fever is an increase in the body's temperature above 100.4°F (38°C) or higher. In adults and children older than three months, a brief mild or moderate fever generally has no long-term effect, and it usually does not require treatment. Many times, fevers are the result of viral infections, which are self-resolving.  However, certain symptoms or diagnostic tests may suggest a bacterial infection that may respond to antibiotics. Take medications as directed by your health care provider.  SEEK IMMEDIATE MEDICAL CARE IF YOU OR YOUR CHILD HAVE ANY OF THE FOLLOWING SYMPTOMS : shortness of breath, seizure, rash/stiff neck/headache, severe abdominal pain, persistent vomiting, any signs of dehydration, or if your child has a fever for over five (5) days.    Diagnosis: Hyponatremia  Assessment and Plan of Treatment: While you were in the hospital, your sodium levels were found to be low. The likely explanation for this was your use of your medication called hydrochlorothiazide (HCTZ). After being discharged from the hospital, discontinue use of HCTZ.       Diagnosis: Severe protein-calorie malnutrition  Assessment and Plan of Treatment:

## 2022-08-10 NOTE — DISCHARGE NOTE PROVIDER - NSDCMRMEDTOKEN_GEN_ALL_CORE_FT
Metoprolol Succinate ER 25 mg oral tablet, extended release: 1 tab(s) orally once a day  valsartan-hydrochlorothiazide 160 mg-25 mg oral tablet: 1 tab(s) orally once a day   cefpodoxime 200 mg oral tablet: 1 tab(s) orally every 12 hours  Metoprolol Succinate ER 25 mg oral tablet, extended release: 1 tab(s) orally once a day  valsartan-hydrochlorothiazide 160 mg-25 mg oral tablet: 1 tab(s) orally once a day   mirtazapine 7.5 mg oral tablet: 1 tab(s) orally once a day  valsartan 160 mg oral tablet: 1 tab(s) orally once a day

## 2022-08-10 NOTE — DISCHARGE NOTE PROVIDER - DETAILS OF MALNUTRITION DIAGNOSIS/DIAGNOSES
This patient has been assessed with a concern for Malnutrition and was treated during this hospitalization for the following Nutrition diagnosis/diagnoses:     -  08/17/2022: Moderate protein-calorie malnutrition

## 2022-08-10 NOTE — PROGRESS NOTE ADULT - SUBJECTIVE AND OBJECTIVE BOX
YOMILOREE  89y, Female  Allergy: No Known Allergies      OVERNIGHT EVENTS:  None reported    PAST MEDICAL & SURGICAL HISTORY:  HTN (hypertension)  Femur neck fracture right  History of hip surgery Right 2022      VITALS:  T(F): 96.2 (08-10-22 @ 13:24), Max: 98.1 (08-09-22 @ 20:01)  HR: 80 (08-10-22 @ 13:24)  BP: 120/56 (08-10-22 @ 13:24) (120/56 - 145/65)  RR: 16 (08-10-22 @ 13:24)  SpO2: --    TESTS & MEASUREMENTS:    Weight (kg): 48 (08-08-22 @ 21:30)    08-08-22 @ 07:01  -  08-09-22 @ 07:00  --------------------------------------------------------  IN: 0 mL / OUT: 925 mL / NET: -925 mL    08-09-22 @ 07:01  -  08-10-22 @ 07:00  --------------------------------------------------------  IN: 0 mL / OUT: 425 mL / NET: -425 mL    PHYSICAL EXAM:    Awake and responsive.    No c/o noted.  Not in pain.  Remains afebrile with vital stable.  Chest CTA b/l.  Cor Reg S1S2.  Abd soft and NT                        11.7   4.18  )-----------( 157      ( 10 Aug 2022 07:14 )             34.1       08-10    129<L>  |  93<L>  |  25<H>  ----------------------------<  80  5.1<H>   |  23  |  1.2    Ca    8.9      10 Aug 2022 07:14  Mg     2.3     08-10    TPro  6.3  /  Alb  3.2<L>  /  TBili  0.2  /  DBili  x   /  AST  38  /  ALT  10  /  AlkPhos  53  08-10    LIVER FUNCTIONS - ( 10 Aug 2022 07:14 )  Alb: 3.2 g/dL / Pro: 6.3 g/dL / ALK PHOS: 53 U/L / ALT: 10 U/L / AST: 38 U/L / GGT: x                 Culture - Urine (collected 08-07-22 @ 17:01)  Source: Clean Catch Clean Catch (Midstream)  Final Report (08-09-22 @ 16:43):    >100,000 CFU/ml Escherichia coli  Organism: Escherichia coli (08-09-22 @ 16:43)  Organism: Escherichia coli (08-09-22 @ 16:43)      -  Amikacin: S <=16      -  Amoxicillin/Clavulanic Acid: S <=8/4      -  Ampicillin: R >16 These ampicillin results predict results for amoxicillin      -  Ampicillin/Sulbactam: I 16/8 Enterobacter, Klebsiella aerogenes, Citrobacter, and Serratia may develop resistance during prolonged therapy (3-4 days)      -  Aztreonam: S <=4      -  Cefazolin: S <=2 (MIC_CL_COM_ENTERIC_CEFAZU) For uncomplicated UTI with K. pneumoniae, E. coli, or P. mirablis: MEGAN <=16 is sensitive and MEGAN >=32 is resistant. This also predicts results for oral agents cefaclor, cefdinir, cefpodoxime, cefprozil, cefuroxime axetil, cephalexin and locarbef for uncomplicated UTI. Note that some isolates may be susceptible to these agents while testing resistant to cefazolin.      -  Cefepime: S <=2      -  Cefoxitin: S <=8      -  Ceftriaxone: S <=1 Enterobacter, Klebsiella aerogenes, Citrobacter, and Serratia may develop resistance during prolonged therapy      -  Ciprofloxacin: S <=0.25      -  Ertapenem: S <=0.5      -  Gentamicin: S <=2      -  Imipenem: S <=1      -  Levofloxacin: S <=0.5      -  Meropenem: S <=1      -  Nitrofurantoin: S <=32 Should not be used to treat pyelonephritis      -  Piperacillin/Tazobactam: S <=8      -  Tigecycline: S <=2      -  Tobramycin: S <=2      -  Trimethoprim/Sulfamethoxazole: R >2/38      Method Type: MEGAN    Culture - Blood (collected 08-07-22 @ 17:01)  Source: .Blood Blood-Peripheral  Preliminary Report (08-09-22 @ 01:03):    No growth to date.    Culture - Blood (collected 08-07-22 @ 17:01)  Source: .Blood Blood-Peripheral  Preliminary Report (08-09-22 @ 01:03):    No growth to date.    MEDICATIONS:  MEDICATIONS  (STANDING):  cefpodoxime 200 milliGRAM(s) Oral every 12 hours  heparin   Injectable 5000 Unit(s) SubCutaneous every 12 hours    MEDICATIONS  (PRN):  acetaminophen     Tablet .. 650 milliGRAM(s) Oral every 6 hours PRN Temp greater or equal to 38.5C (101.3F)

## 2022-08-10 NOTE — PROGRESS NOTE ADULT - SUBJECTIVE AND OBJECTIVE BOX
HPI:  89 year old female with a history of HTN and HLD presents to the ED from [facility] with altered mental status and fever. On presentation, pt minimally responsive, only gives name, unable to collect history. Spoke with son Rohan over the phone. Pt was noted to be not herself, not recognizing her daughter at the NH, also spiked a fever at NH to 103 so brought to ED. As per son, at baseline pt ~AAO*2, has had progressive functional/cognitive decline over the last few months, sometimes forgets names but knows herself and kids, usually knows where she is, likely not know year/president, walks with walker, needs help dressing/bathroom. Son lives in [place], his sister lives on  and visits mother daily. As per ED note, Daughter has not noted cough, shortness of breath, vomiting, malodourous urine. (07 Aug 2022 23:31)    ADVANCE DIRECTIVES:    DNR  MOLST  [ ]  Living Will  [ ]   DECISION MAKER(s):  [ ] Health Care Proxy(s)  [x ] Surrogate(s)  [ ] Guardian           Name(s): Phone Number(s):  Rohan Richardson [phone]  Alma Knight [phone]    BASELINE (I)ADL(s) (prior to admission):  [place]: [ ]Total  [x ] Moderate [ ]Dependent  Palliative Performance Status Version 2:         %    http://npcrc.org/files/news/palliative_performance_scale_ppsv2.pdf    Allergies    No Known Allergies    Intolerances    MEDICATIONS  (STANDING):  cefpodoxime 200 milliGRAM(s) Oral every 12 hours  heparin   Injectable 5000 Unit(s) SubCutaneous every 12 hours    MEDICATIONS  (PRN):  acetaminophen     Tablet .. 650 milliGRAM(s) Oral every 6 hours PRN Temp greater or equal to 38.5C (101.3F)    PRESENT SYMPTOMS: [ ]Unable to obtain due to poor mentation   Source if other than patient:  [ ]Family   [ ]Team     Pain: [ ]yes [x]no  QOL impact -   Location -                    Aggravating factors -  Quality -  Radiation -  Timing-  Severity (0-10 scale):  Minimal acceptable level (0-10 scale):     CPOT:    https://www.[email or web address]/getattachment/[email or web address]/Critical-Care-Pain-Observation-Tool-(CPOT)      PAIN AD Score:     [email or web address].pdf (press ctrl +  left click to view)    Dyspnea:                           [ ]Mild [ ]Moderate [ ]Severe  Anxiety:                             [ ]Mild [ ]Moderate [ ]Severe  Fatigue:                             [ ]Mild [ ]Moderate [ ]Severe  Nausea:                             [ ]Mild [ ]Moderate [ ]Severe  Loss of appetite:              [ ]Mild [ ]Moderate [ ]Severe  Constipation:                    [ ]Mild [ ]Moderate [ ]Severe    Other Symptoms:  [x ]All other review of systems negative     Palliative Performance Status Version 2:         %    http://npcrc.org/files/news/palliative_performance_scale_ppsv2.pdf    PHYSICAL EXAM:  Vital Signs Last 24 Hrs  T(C): 35.7 (10 Aug 2022 13:24), Max: 36.7 (09 Aug 2022 20:01)  T(F): 96.2 (10 Aug 2022 13:24), Max: 98.1 (09 Aug 2022 20:01)  HR: 80 (10 Aug 2022 13:24) (80 - 88)  BP: 120/56 (10 Aug 2022 13:24) (120/56 - 145/65)  BP(mean): --  RR: 16 (10 Aug 2022 13:24) (16 - 18)  SpO2: --    Parameters below as of 10 Aug 2022 13:24  Patient On (Oxygen Delivery Method): room air     I&O's Summary    09 Aug 2022 07:01  -  10 Aug 2022 07:00  --------------------------------------------------------  IN: 0 mL / OUT: 425 mL / NET: -425 mL      GENERAL:  [ ]Alert  [ ]Oriented x   [ ]Lethargic  [ ]Cachexia  [ ]Unarousable  [ X]Verbal  [ ]Non-Verbal  Behavioral:   [ ] Anxiety  [ ] Delirium [ ] Agitation [X ] Calm [ ] Other  HEENT:  [X ]Normal   [ ]Dry mouth   [ ]ET Tube/Trach  [ ]Oral lesions  PULMONARY:   [ ]Clear [ ]Tachypnea  [ ]Audible excessive secretions [X ] No labored breathing  [ ]Rhonchi        [ ]Right [ ]Left [ ]Bilateral  [ ]Crackles        [ ]Right [ ]Left [ ]Bilateral  [ ]Wheezing     [ ]Right [ ]Left [ ]Bilateral  [ ]Diminished breath sounds [ ]right [ ]left [ ]bilateral  CARDIOVASCULAR:    [ ]Regular [ ]Irregular [ ]Tachy  [ ]Jordy [ ]Murmur [ ]Other  GASTROINTESTINAL:  [ ]Soft  [ ]Distended  [X ] Not distended [ ]+BS  [ ]Non tender [ ]Tender  [ ]PEG [ ]OGT/ NGT  Last BM:   GENITOURINARY:  [ ]Normal [ ] Incontinent   [ ]Oliguria/Anuria   [ X]Lester  MUSCULOSKELETAL:   [ ]Normal   [ ]Weakness  [ X]Bed/Wheelchair bound [ ]Edema  NEUROLOGIC:   [ ]No focal deficits  [ X]Cognitive impairment  [ ]Dysphagia [ ]Dysarthria [ ]Paresis [ ]Other   SKIN:   [ ]Normal   [X ] Nonjaundiced [ ]Rash  [ ]Pressure ulcer(s)       Present on admission [ ]y [ ]n    LABS:                                   11.7   4.18  )-----------( 157      ( 10 Aug 2022 07:14 )             34.1   08-10    129<L>  |  93<L>  |  25<H>  ----------------------------<  80  5.1<H>   |  23  |  1.2    Ca    8.9      10 Aug 2022 07:14  Mg     2.3     08-10          RADIOLOGY & ADDITIONAL STUDIES:  < from: 12 Lead ECG (08.07.22 @ 18:19) >  Ventricular Rate 79 BPM    Atrial Rate 79 BPM    P-R Interval 198 ms    QRS Duration 78 ms    Q-T Interval 384 ms    QTC Calculation(Bazett) 440 ms    P Axis 84 degrees    R Axis -7 degrees    T Axis 5 degrees    Diagnosis Line Sinus rhythm with Premature supraventricular complexes  Nonspecific ST abnormality  Abnormal ECG    Confirmed by Mukesh Douglass ([id number]) on 8/7/2022 10:33:20 PM    < end of copied text >        REFERRALS:   [ ]Chaplaincy  [ ]Hospice  [ ]Child Life  [ x]Social Work  [x ]Case management [ ]Holistic Therapy     Goals of Care Document:

## 2022-08-10 NOTE — DISCHARGE NOTE PROVIDER - HOSPITAL COURSE
Patient is a 89 yo F with PMH of HTN and HLD who presents to the ED from [facility] with AMS and fever. As per son, pt was noted to be not like herself, as she did not recognize her daughter at the NH and also spiked a fever to 103F. At baseline, pt is AAOx2, but has had progressive functional and cognitive decline for a few months. She will forget names, forget the year and president, and needs help dressing and using the restroom, but knows self, kids, and location. Daughter has not noted cough, SOB, vomiting, or malodorous urine, but patient says she has a cough.     In the ED, patient was febrile .3. Labs were significant for Na 123, BNP 3300, UA with small LE, WBC 15, but no bacteriea. Patient was only minimally responsive and only gave her name. Was given a dose of cefepime and 1 L bolus NS.   EKG showed sinus rhythm with premature SVCs. CXR showed pleural-based density in the left lower lobe.     Patient was admitted for AMS and fever, with suspicion of acute metabolic encephalopathy likely 2/2 infection and electrolyte abnormalities.   Chest CT was negative. Urine culture showed >100,000 E. Coli. Patient received azithromycin and completed a 3-day course of ceftriaxone.     To discontinue use of HCTZ at home for suspected cause of hyponatremia. Patient is a 89 yo F with PMH of HTN and HLD who presents to the ED from [facility] with AMS and fever. As per son, pt was noted to be not like herself, as she did not recognize her daughter at the NH and also spiked a fever to 103F. At baseline, pt is AAOx2, but has had progressive functional and cognitive decline for a few months. She will forget names, forget the year and president, and needs help dressing and using the restroom, but knows self, kids, and location. Daughter has not noted cough, SOB, vomiting, or malodorous urine, but patient says she has a cough.     In the ED, patient was febrile .3. Labs were significant for Na 123, BNP 3300, UA with small LE, WBC 15, but no bacteriea. Patient was only minimally responsive and only gave her name. Was given a dose of cefepime and 1 L bolus NS.   EKG showed sinus rhythm with premature SVCs. CXR showed pleural-based density in the left lower lobe.     Patient was admitted for AMS and fever, with suspicion of acute metabolic encephalopathy likely 2/2 infection and electrolyte abnormalities.   Chest CT was negative. Urine culture showed >100,000 E. Coli. Patient received azithromycin and completed a 3-day course of ceftriaxone to complete a course of vantin as outpatient.     To discontinue use of HCTZ at home for suspected cause of hyponatremia. Patient is a 89 yo F with PMH of HTN and HLD who presents [facility] and fever. As per son, pt was noted to be not like herself, as she did not recognize her daughter at the NH and also spiked a fever to 103F. At baseline, pt is AAOx2, but has had progressive functional and cognitive decline for a few months. She will forget names, forget the year and president, and needs help dressing and using the restroom, but knows self, kids, and location. Daughter has not noted cough, SOB, vomiting, or malodorous urine, but patient says she has a cough.     In the ED, patient was febrile .3. Labs were significant for Na 123, BNP 3300, UA with small LE, WBC 15, but no bacteriea. Patient was only minimally responsive and only gave her name. Was given a dose of cefepime and 1 L bolus NS.   EKG showed sinus rhythm with premature SVCs. CXR showed pleural-based density in the left lower lobe.     Patient was admitted for AMS and fever, with suspicion of acute metabolic encephalopathy likely 2/2 infection and electrolyte abnormalities. HCTZ was discontinued as suspected cause of hyponatremia.Chest CT was negative. Urine culture showed >100,000 E. Coli.  Patient completed course of antibiotics. AMS resolved patient back to baseline mental status. Tested positive for COVID was treated with Remdesivir for 3 days. Noted to have poor protein-caloric intake, ~50% of daily needs. Family discussion ultimately resulted in no decision for PEG tube, patient to be discharged to long term nursing home, nutrition team following closely.

## 2022-08-10 NOTE — PROGRESS NOTE ADULT - ASSESSMENT
89yFemale being evaluated for goals of care and symptom management. Pt appears comfortable, denies pain. Being seen by PT now. Has barker cath, on IV ABT.     Case d/w attending       MEDD (morphine equivalent daily dose):0      See Recs below.    Please call [phone] with questions or concerns 24/7.   We will continue to follow.

## 2022-08-10 NOTE — PROGRESS NOTE ADULT - ASSESSMENT
Patient examined and discussed with sub-I and medical resident.  Neurologically back to baseline mentation with resolution of acute metabolic encephalopathy.  + E.coli UTI with hyponatremia.  E.coli is pansensitive.  Will complete total of 5-7 days course of antibiotic for complicated UTI in this elderly patient who presented with fever and encephalopathy.  Na improved with level at 129 this AM.  K mildly elevated.  + CKD stage 3, stable.  May f/up on these abn labs outpt.  Pt not to resume HCTZ upon transfer.  DC back to NH pending covid testing.

## 2022-08-11 PROCEDURE — 71045 X-RAY EXAM CHEST 1 VIEW: CPT | Mod: 26

## 2022-08-11 PROCEDURE — 99232 SBSQ HOSP IP/OBS MODERATE 35: CPT

## 2022-08-11 RX ADMIN — HEPARIN SODIUM 5000 UNIT(S): 5000 INJECTION INTRAVENOUS; SUBCUTANEOUS at 05:50

## 2022-08-11 RX ADMIN — Medication 200 MILLIGRAM(S): at 17:40

## 2022-08-11 RX ADMIN — Medication 200 MILLIGRAM(S): at 05:50

## 2022-08-11 RX ADMIN — HEPARIN SODIUM 5000 UNIT(S): 5000 INJECTION INTRAVENOUS; SUBCUTANEOUS at 17:40

## 2022-08-11 NOTE — PROGRESS NOTE ADULT - SUBJECTIVE AND OBJECTIVE BOX
LOREE CELAYA 89y Female  MRN#: [id number]      Pt is currently admitted with the primary diagnosis of       Hospital Day: 4d  CC: "was not herself"   HPI: Patient is a 89 yo F with PMH of HTN and HLD who presents to the ED from [facility] with AMS and fever. As per son, pt was noted to be not like herself, as she did not recognize her daughter at the NH and also spiked a fever to 103F. At baseline, pt is AAOx2, but has had progressive functional and cognitive decline x a few months. She will forget names, forget year/president, and needs help dressing and using the restroom, but knows self and kids, knows location. Daughter has not noted cough, SOB, vomiting, or malodorous urine, however as per patient has had cough.     Hospital Course:  In the ED, pt was only minimally responsive and only gave name.  Was given dose of cefepime and 1 L bolus NS.  Na 123, BNP 3300, UA with small LE, WBC 15, no bacteria.  EKG showed sinus rhythm with premature SVCs  CXR showed pleural-based density in the left lower lobe.         Overnight events:  Patient tested for COVID for d/c back to NH but then positive for COVID. 2:41 PM.     Subjective complaints:  None                                            ----------------------------------------------------------    PAST MEDICAL & SURGICAL HISTORY  HTN (hypertension)    Femur neck fracture  right    History of hip surgery  Right 2022                                              -----------------------------------------------------------  ALLERGIES:  No Known Allergies                                            ------------------------------------------------------------    HOME MEDICATIONS  Home Medications:  Metoprolol Succinate ER 25 mg oral tablet, extended release: 1 tab(s) orally once a day (13 Mar 2022 12:56)  valsartan-hydrochlorothiazide 160 mg-25 mg oral tablet: 1 tab(s) orally once a day (13 Mar 2022 12:56)                           MEDICATIONS:  STANDING MEDICATIONS  cefpodoxime 200 milliGRAM(s) Oral every 12 hours  heparin   Injectable 5000 Unit(s) SubCutaneous every 12 hours    PRN MEDICATIONS  acetaminophen     Tablet .. 650 milliGRAM(s) Oral every 6 hours PRN                                            ------------------------------------------------------------  VITAL SIGNS: Last 24 Hours  T(C): 36.3 (11 Aug 2022 05:31), Max: 36.3 (11 Aug 2022 05:31)  T(F): 97.3 (11 Aug 2022 05:31), Max: 97.3 (11 Aug 2022 05:31)  HR: 73 (11 Aug 2022 05:31) (73 - 80)  BP: 140/96 (11 Aug 2022 05:31) (120/56 - 140/96)  BP(mean): --  RR: 18 (11 Aug 2022 05:31) (16 - 18)  SpO2: --                                             --------------------------------------------------------------  LABS:                        11.7   4.18  )-----------( 157      ( 10 Aug 2022 07:14 )             34.1     08-10    129<L>  |  93<L>  |  25<H>  ----------------------------<  80  5.1<H>   |  23  |  1.2    Ca    8.9      10 Aug 2022 07:14  Mg     2.3     08-10    TPro  6.3  /  Alb  3.2<L>  /  TBili  0.2  /  DBili  x   /  AST  38  /  ALT  10  /  AlkPhos  53  08-10                                                              -------------------------------------------------------------  RADIOLOGY:                                            --------------------------------------------------------------    PHYSICAL EXAM:  General: well-appearing in NAD. AAO x 4.  HEENT: Normocephalic, nontraumatic.   LUNGS: Clear to auscultation b/l. No wheezes, rales, or rhonchi.  HEART: RRR. No murmurs, rubs, or gallops.  ABDOMEN: Nontender, nondistended. + bowel sounds.  EXT: Pulses palpable x 4. Nonedematous.  NEURO: Deferred.  SKIN: Warm, dry.         LOREE CELAYA 89y Female  MRN#: [id number]      Pt is currently admitted with the primary diagnosis of       Hospital Day: 4d  CC: "was not herself"   HPI: Patient is a 89 yo F with PMH of HTN and HLD who presents to the ED from [facility] with AMS and fever. As per son, pt was noted to be not like herself, as she did not recognize her daughter at the NH and also spiked a fever to 103F. At baseline, pt is AAOx2, but has had progressive functional and cognitive decline x a few months. She will forget names, forget year/president, and needs help dressing and using the restroom, but knows self and kids, knows location. Daughter has not noted cough, SOB, vomiting, or malodorous urine, however as per patient has had cough.     Hospital Course:  In the ED, pt was only minimally responsive and only gave name.  Was given dose of cefepime and 1 L bolus NS.  Na 123, BNP 3300, UA with small LE, WBC 15, no bacteria.  EKG showed sinus rhythm with premature SVCs  CXR showed pleural-based density in the left lower lobe.         Overnight events:  Patient tested for COVID for d/c back to NH but then positive for COVID. 2:41 PM.     Subjective complaints:  None                                            ----------------------------------------------------------    PAST MEDICAL & SURGICAL HISTORY  HTN (hypertension)    Femur neck fracture  right    History of hip surgery  Right 2022                                              -----------------------------------------------------------  ALLERGIES:  No Known Allergies                                            ------------------------------------------------------------    HOME MEDICATIONS  Home Medications:  Metoprolol Succinate ER 25 mg oral tablet, extended release: 1 tab(s) orally once a day (13 Mar 2022 12:56)  valsartan-hydrochlorothiazide 160 mg-25 mg oral tablet: 1 tab(s) orally once a day (13 Mar 2022 12:56)                           MEDICATIONS:  STANDING MEDICATIONS  cefpodoxime 200 milliGRAM(s) Oral every 12 hours  heparin   Injectable 5000 Unit(s) SubCutaneous every 12 hours    PRN MEDICATIONS  acetaminophen     Tablet .. 650 milliGRAM(s) Oral every 6 hours PRN                                            ------------------------------------------------------------  VITAL SIGNS: Last 24 Hours  T(C): 36.3 (11 Aug 2022 05:31), Max: 36.3 (11 Aug 2022 05:31)  T(F): 97.3 (11 Aug 2022 05:31), Max: 97.3 (11 Aug 2022 05:31)  HR: 73 (11 Aug 2022 05:31) (73 - 80)  BP: 140/96 (11 Aug 2022 05:31) (120/56 - 140/96)  BP(mean): --  RR: 18 (11 Aug 2022 05:31) (16 - 18)  SpO2: --                                             --------------------------------------------------------------  LABS:                        11.7   4.18  )-----------( 157      ( 10 Aug 2022 07:14 )             34.1     08-10    129<L>  |  93<L>  |  25<H>  ----------------------------<  80  5.1<H>   |  23  |  1.2    Ca    8.9      10 Aug 2022 07:14  Mg     2.3     08-10    TPro  6.3  /  Alb  3.2<L>  /  TBili  0.2  /  DBili  x   /  AST  38  /  ALT  10  /  AlkPhos  53  08-10                                                              -------------------------------------------------------------  RADIOLOGY:                                            --------------------------------------------------------------    PHYSICAL EXAM:  General: well-appearing in NAD. AAO x 3. Intermittent coughing, nonproductive.  HEENT: Normocephalic, nontraumatic. Wearing a hearing aid in right ear but also headphones with microphone because hard of hearing.  LUNGS: Sounded clear to auscultation b/l.   HEART: RRR. No murmurs, rubs, or gallops.  ABDOMEN: Nontender, nondistended. + bowel sounds.  EXT: Pulses palpable x 4. Nonedematous.   SKIN: Warm, dry.

## 2022-08-11 NOTE — PROGRESS NOTE ADULT - ASSESSMENT
89 yo F PMH HTN, HLD from [facility] brought in for AMS, cough, and fever.      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

## 2022-08-11 NOTE — PROGRESS NOTE ADULT - ASSESSMENT
Patient seen at bedside. Discussed with medical team that includes resident(s), medical student(s), nursing staff, case management.     89 yo F PMH HTN, HLD from [facility] brought in for AMS, cough, and fever.    acute metabolic encephalopathy. improved   + E.coli UTI.  E.coli is pansensitive.  onof 5-7 days course of antibiotic for complicated UTI in this elderly patient who presented with fever and encephalopathy.    Hyponatremia, improved  Na improved with level at 129  follow daily BMP  Pt not to resume HCTZ upon transfer.    K mildly elevated.    recheck BMP, pending   follow labs     COVID positive  Monitor for oxygen requirement  mild cough  CXR ordered. Follow up with result.  follow labs. Any signs of clinical deterioration, respiratory compromise ID/Pulm/possible ICU transfer  Currently patient is stable and saturating >92% on room air  no SOB reported.  Patient seen at bedside. Discussed with medical team that includes resident(s), medical student(s), nursing staff, case management.     89 yo F PMH HTN, HLD from [facility] brought in for AMS, cough, and fever.    acute metabolic encephalopathy. improved   + E.coli UTI.  E.coli is pansensitive.  onof 5-7 days course of antibiotic for complicated UTI in this elderly patient who presented with fever and encephalopathy.    Hyponatremia, improved  Na improved with level at 129  follow daily BMP  Pt not to resume HCTZ upon transfer.    K mildly elevated.    recheck BMP, pending   follow labs     COVID positive  Monitor for oxygen requirement  mild cough  CXR ordered. Follow up with result.  follow labs. Any signs of clinical deterioration, respiratory compromise ID/Pulm/possible ICU transfer  Currently patient is stable and saturating >92% on room air  no SOB reported.   follow legionella/stre pna antigen

## 2022-08-11 NOTE — PROGRESS NOTE ADULT - SUBJECTIVE AND OBJECTIVE BOX
YOMILOREE  89y, Female  Allergy: No Known Allergies      OVERNIGHT EVENTS:  None reported  Sub:   Patient reported mild cough but no fever. She did not report any chest pain. currently on room air.  Incidently found to be covid positive which was done for screening/placement purpose     PAST MEDICAL & SURGICAL HISTORY:  HTN (hypertension)  Femur neck fracture right  History of hip surgery Right 2022      VITALS:  Vital Signs Last 24 Hrs  T(C): 36.1 (11 Aug 2022 13:13), Max: 36.3 (11 Aug 2022 05:31)  T(F): 96.9 (11 Aug 2022 13:13), Max: 97.3 (11 Aug 2022 05:31)  HR: 70 (11 Aug 2022 13:13) (70 - 77)  BP: 160/67 (11 Aug 2022 13:13) (130/63 - 160/67)  BP(mean): --  RR: 18 (11 Aug 2022 13:13) (18 - 18)  SpO2: --    Parameters below as of 11 Aug 2022 13:13  Patient On (Oxygen Delivery Method): room air        TESTS & MEASUREMENTS:    Weight (kg): 48 (08-08-22 @ 21:30)    08-08-22 @ 07:01  -  08-09-22 @ 07:00  --------------------------------------------------------  IN: 0 mL / OUT: 925 mL / NET: -925 mL    08-09-22 @ 07:01  -  08-10-22 @ 07:00  --------------------------------------------------------  IN: 0 mL / OUT: 425 mL / NET: -425 mL    PHYSICAL EXAM:    Awake and responsive.    No c/o noted.  Not in pain.  Remains afebrile with vital stable.  Chest CTA b/l.  Cor Reg S1S2.  Abd soft and NT                        11.7   4.18  )-----------( 157      ( 10 Aug 2022 07:14 )             34.1       08-10    129<L>  |  93<L>  |  25<H>  ----------------------------<  80  5.1<H>   |  23  |  1.2    Ca    8.9      10 Aug 2022 07:14  Mg     2.3     08-10    TPro  6.3  /  Alb  3.2<L>  /  TBili  0.2  /  DBili  x   /  AST  38  /  ALT  10  /  AlkPhos  53  08-10    LIVER FUNCTIONS - ( 10 Aug 2022 07:14 )  Alb: 3.2 g/dL / Pro: 6.3 g/dL / ALK PHOS: 53 U/L / ALT: 10 U/L / AST: 38 U/L / GGT: x                 Culture - Urine (collected 08-07-22 @ 17:01)  Source: Clean Catch Clean Catch (Midstream)  Final Report (08-09-22 @ 16:43):    >100,000 CFU/ml Escherichia coli  Organism: Escherichia coli (08-09-22 @ 16:43)  Organism: Escherichia coli (08-09-22 @ 16:43)      -  Amikacin: S <=16      -  Amoxicillin/Clavulanic Acid: S <=8/4      -  Ampicillin: R >16 These ampicillin results predict results for amoxicillin      -  Ampicillin/Sulbactam: I 16/8 Enterobacter, Klebsiella aerogenes, Citrobacter, and Serratia may develop resistance during prolonged therapy (3-4 days)      -  Aztreonam: S <=4      -  Cefazolin: S <=2 ([id number]_[email or web address]) For uncomplicated UTI with K. pneumoniae, E. coli, or P. mirablis: MEGAN <=16 is sensitive and MEGAN >=32 is resistant. This also predicts results for oral agents cefaclor, cefdinir, cefpodoxime, cefprozil, cefuroxime axetil, cephalexin and locarbef for uncomplicated UTI. Note that some isolates may be susceptible to these agents while testing resistant to cefazolin.      -  Cefepime: S <=2      -  Cefoxitin: S <=8      -  Ceftriaxone: S <=1 Enterobacter, Klebsiella aerogenes, Citrobacter, and Serratia may develop resistance during prolonged therapy      -  Ciprofloxacin: S <=0.25      -  Ertapenem: S <=0.5      -  Gentamicin: S <=2      -  Imipenem: S <=1      -  Levofloxacin: S <=0.5      -  Meropenem: S <=1      -  Nitrofurantoin: S <=32 Should not be used to treat pyelonephritis      -  Piperacillin/Tazobactam: S <=8      -  Tigecycline: S <=2      -  Tobramycin: S <=2      -  Trimethoprim/Sulfamethoxazole: R >2/38      Method Type: MEGAN    Culture - Blood (collected 08-07-22 @ 17:01)  Source: .Blood Blood-Peripheral  Preliminary Report (08-09-22 @ 01:03):    No growth to date.    Culture - Blood (collected 08-07-22 @ 17:01)  Source: .Blood Blood-Peripheral  Preliminary Report (08-09-22 @ 01:03):    No growth to date.    MEDICATIONS:  MEDICATIONS  (STANDING):  cefpodoxime 200 milliGRAM(s) Oral every 12 hours  heparin   Injectable 5000 Unit(s) SubCutaneous every 12 hours    MEDICATIONS  (PRN):  acetaminophen     Tablet .. 650 milliGRAM(s) Oral every 6 hours PRN Temp greater or equal to 38.5C (101.3F)

## 2022-08-12 LAB
ALBUMIN SERPL ELPH-MCNC: 3.6 G/DL — SIGNIFICANT CHANGE UP (ref 3.5–5.2)
ALP SERPL-CCNC: 55 U/L — SIGNIFICANT CHANGE UP (ref 30–115)
ALT FLD-CCNC: 10 U/L — SIGNIFICANT CHANGE UP (ref 0–41)
ANION GAP SERPL CALC-SCNC: 14 MMOL/L — SIGNIFICANT CHANGE UP (ref 7–14)
ANISOCYTOSIS BLD QL: SLIGHT — SIGNIFICANT CHANGE UP
AST SERPL-CCNC: 26 U/L — SIGNIFICANT CHANGE UP (ref 0–41)
BASOPHILS # BLD AUTO: 0 K/UL — SIGNIFICANT CHANGE UP (ref 0–0.2)
BASOPHILS NFR BLD AUTO: 0 % — SIGNIFICANT CHANGE UP (ref 0–1)
BILIRUB SERPL-MCNC: 0.3 MG/DL — SIGNIFICANT CHANGE UP (ref 0.2–1.2)
BUN SERPL-MCNC: 19 MG/DL — SIGNIFICANT CHANGE UP (ref 10–20)
CALCIUM SERPL-MCNC: 9.2 MG/DL — SIGNIFICANT CHANGE UP (ref 8.5–10.1)
CHLORIDE SERPL-SCNC: 95 MMOL/L — LOW (ref 98–110)
CO2 SERPL-SCNC: 25 MMOL/L — SIGNIFICANT CHANGE UP (ref 17–32)
CREAT SERPL-MCNC: 1 MG/DL — SIGNIFICANT CHANGE UP (ref 0.7–1.5)
D DIMER BLD IA.RAPID-MCNC: 239 NG/ML DDU — HIGH (ref 0–230)
EGFR: 52 ML/MIN/1.73M2 — LOW
EOSINOPHIL # BLD AUTO: 0.03 K/UL — SIGNIFICANT CHANGE UP (ref 0–0.7)
EOSINOPHIL NFR BLD AUTO: 0.9 % — SIGNIFICANT CHANGE UP (ref 0–8)
GIANT PLATELETS BLD QL SMEAR: PRESENT — SIGNIFICANT CHANGE UP
GLUCOSE SERPL-MCNC: 81 MG/DL — SIGNIFICANT CHANGE UP (ref 70–99)
HCT VFR BLD CALC: 37.4 % — SIGNIFICANT CHANGE UP (ref 37–47)
HGB BLD-MCNC: 12.5 G/DL — SIGNIFICANT CHANGE UP (ref 12–16)
LDH SERPL L TO P-CCNC: 171 — SIGNIFICANT CHANGE UP (ref 50–242)
LYMPHOCYTES # BLD AUTO: 1.11 K/UL — LOW (ref 1.2–3.4)
LYMPHOCYTES # BLD AUTO: 38.5 % — SIGNIFICANT CHANGE UP (ref 20.5–51.1)
MAGNESIUM SERPL-MCNC: 1.9 MG/DL — SIGNIFICANT CHANGE UP (ref 1.8–2.4)
MANUAL SMEAR VERIFICATION: SIGNIFICANT CHANGE UP
MCHC RBC-ENTMCNC: 28.6 PG — SIGNIFICANT CHANGE UP (ref 27–31)
MCHC RBC-ENTMCNC: 33.4 G/DL — SIGNIFICANT CHANGE UP (ref 32–37)
MCV RBC AUTO: 85.6 FL — SIGNIFICANT CHANGE UP (ref 81–99)
METAMYELOCYTES # FLD: 0.9 % — HIGH (ref 0–0)
MICROCYTES BLD QL: SLIGHT — SIGNIFICANT CHANGE UP
MONOCYTES # BLD AUTO: 0.32 K/UL — SIGNIFICANT CHANGE UP (ref 0.1–0.6)
MONOCYTES NFR BLD AUTO: 11 % — HIGH (ref 1.7–9.3)
NEUTROPHILS # BLD AUTO: 1.32 K/UL — LOW (ref 1.4–6.5)
NEUTROPHILS NFR BLD AUTO: 45.9 % — SIGNIFICANT CHANGE UP (ref 42.2–75.2)
PLAT MORPH BLD: NORMAL — SIGNIFICANT CHANGE UP
PLATELET # BLD AUTO: 185 K/UL — SIGNIFICANT CHANGE UP (ref 130–400)
POLYCHROMASIA BLD QL SMEAR: SLIGHT — SIGNIFICANT CHANGE UP
POTASSIUM SERPL-MCNC: 5 MMOL/L — SIGNIFICANT CHANGE UP (ref 3.5–5)
POTASSIUM SERPL-SCNC: 5 MMOL/L — SIGNIFICANT CHANGE UP (ref 3.5–5)
PROT SERPL-MCNC: 6.5 G/DL — SIGNIFICANT CHANGE UP (ref 6–8)
RBC # BLD: 4.37 M/UL — SIGNIFICANT CHANGE UP (ref 4.2–5.4)
RBC # FLD: 12.8 % — SIGNIFICANT CHANGE UP (ref 11.5–14.5)
RBC BLD AUTO: ABNORMAL
SMUDGE CELLS # BLD: PRESENT — SIGNIFICANT CHANGE UP
SODIUM SERPL-SCNC: 134 MMOL/L — LOW (ref 135–146)
VARIANT LYMPHS # BLD: 2.8 % — SIGNIFICANT CHANGE UP (ref 0–5)
WBC # BLD: 2.88 K/UL — LOW (ref 4.8–10.8)
WBC # FLD AUTO: 2.88 K/UL — LOW (ref 4.8–10.8)

## 2022-08-12 PROCEDURE — 99232 SBSQ HOSP IP/OBS MODERATE 35: CPT

## 2022-08-12 RX ORDER — REMDESIVIR 5 MG/ML
INJECTION INTRAVENOUS
Refills: 0 | Status: DISCONTINUED | OUTPATIENT
Start: 2022-08-12 | End: 2022-08-15

## 2022-08-12 RX ORDER — REMDESIVIR 5 MG/ML
200 INJECTION INTRAVENOUS EVERY 24 HOURS
Refills: 0 | Status: COMPLETED | OUTPATIENT
Start: 2022-08-12 | End: 2022-08-13

## 2022-08-12 RX ADMIN — Medication 200 MILLIGRAM(S): at 06:37

## 2022-08-12 RX ADMIN — Medication 200 MILLIGRAM(S): at 17:40

## 2022-08-12 RX ADMIN — HEPARIN SODIUM 5000 UNIT(S): 5000 INJECTION INTRAVENOUS; SUBCUTANEOUS at 05:38

## 2022-08-12 RX ADMIN — HEPARIN SODIUM 5000 UNIT(S): 5000 INJECTION INTRAVENOUS; SUBCUTANEOUS at 17:40

## 2022-08-12 NOTE — PROGRESS NOTE ADULT - ASSESSMENT
Patient examined and discussed with team.  F/up of this 89 yo F with hx of HTN, HLD, s/p recent R hip fx d/t fall s/p ORIF initially admitted with acute metabolic encephalopathy with fever to 103 and found to have abn UA and low sodium level and elevated creatinine.  Mentation and electrolytes improved and plan was to transfer back to [facility] when covid test returned and it was positive.  Now remains hospitalized on covid isolation; cannot return back to NH as yet per  / SW.    - Metabolic encephalopathy; resolved.  Back at baseline mentation  - E.coli UTI.  Afebrile.  E.coli is pan-sensitive; pt already rec'd 3 days of IV Ceftriaxone and may continue rest of abx course with po Vantin.  - Covid +.  Not hypoxic.  + Leukopenia.  IV Remdesivir x 3 days; maintain covid precautions.    DVT prophylaxis, nutrition to f/up and fall precautions.

## 2022-08-12 NOTE — PROGRESS NOTE ADULT - SUBJECTIVE AND OBJECTIVE BOX
MEISONNYLOREE MORRISON  89y, Female  Allergy: No Known Allergies      OVERNIGHT EVENTS:    PAST MEDICAL & SURGICAL HISTORY:  HTN (hypertension)  Femur neck fracture right  History of hip surgery Right 2022      VITALS:  T(F): 97.1 (08-12-22 @ 12:52), Max: 98.4 (08-11-22 @ 20:18)  HR: 76 (08-12-22 @ 12:52)  BP: 144/61 (08-12-22 @ 12:52) (128/81 - 154/69)  RR: 18 (08-12-22 @ 12:52)  SpO2: 98% (08-12-22 @ 12:52)    PHYSICAL EXAM:    Awake, alert and in no distress.  Very [place] IRA  Neck supple  Chest CTA b/l  Cor Reg S1S2  Abd soft and NT                          12.5   2.88  )-----------( 185      ( 12 Aug 2022 06:59 )             37.4       08-12    134<L>  |  95<L>  |  19  ----------------------------<  81  5.0   |  25  |  1.0    Ca    9.2      12 Aug 2022 06:59  Mg     1.9     08-12    TPro  6.5  /  Alb  3.6  /  TBili  0.3  /  DBili  x   /  AST  26  /  ALT  10  /  AlkPhos  55  08-12    LIVER FUNCTIONS - ( 12 Aug 2022 06:59 )  Alb: 3.6 g/dL / Pro: 6.5 g/dL / ALK PHOS: 55 U/L / ALT: 10 U/L / AST: 26 U/L / GGT: x           Culture - Urine (collected 08-07-22 @ 17:01)  Source: Clean Catch Clean Catch (Midstream)  Final Report (08-09-22 @ 16:43):    >100,000 CFU/ml Escherichia coli  Organism: Escherichia coli (08-09-22 @ 16:43)  Organism: Escherichia coli (08-09-22 @ 16:43)      -  Amikacin: S <=16      -  Amoxicillin/Clavulanic Acid: S <=8/4      -  Ampicillin: R >16 These ampicillin results predict results for amoxicillin      -  Ampicillin/Sulbactam: I 16/8 Enterobacter, Klebsiella aerogenes, Citrobacter, and Serratia may develop resistance during prolonged therapy (3-4 days)      -  Aztreonam: S <=4      -  Cefazolin: S <=2 ([email or web address]) For uncomplicated UTI with K. pneumoniae, E. coli, or P. mirablis: MEGAN <=16 is sensitive and MEGAN >=32 is resistant. This also predicts results for oral agents cefaclor, cefdinir, cefpodoxime, cefprozil, cefuroxime axetil, cephalexin and locarbef for uncomplicated UTI. Note that some isolates may be susceptible to these agents while testing resistant to cefazolin.      -  Cefepime: S <=2      -  Cefoxitin: S <=8      -  Ceftriaxone: S <=1 Enterobacter, Klebsiella aerogenes, Citrobacter, and Serratia may develop resistance during prolonged therapy      -  Ciprofloxacin: S <=0.25      -  Ertapenem: S <=0.5      -  Gentamicin: S <=2      -  Imipenem: S <=1      -  Levofloxacin: S <=0.5      -  Meropenem: S <=1      -  Nitrofurantoin: S <=32 Should not be used to treat pyelonephritis      -  Piperacillin/Tazobactam: S <=8      -  Tigecycline: S <=2      -  Tobramycin: S <=2      -  Trimethoprim/Sulfamethoxazole: R >2/38      Method Type: MEGAN    Culture - Blood (collected 08-07-22 @ 17:01)  Source: .Blood Blood-Peripheral  Preliminary Report (08-09-22 @ 01:03):    No growth to date.    Culture - Blood (collected 08-07-22 @ 17:01)  Source: .Blood Blood-Peripheral  Preliminary Report (08-09-22 @ 01:03):    No growth to date.      MEDICATIONS:  MEDICATIONS  (STANDING):  cefpodoxime 200 milliGRAM(s) Oral every 12 hours  heparin   Injectable 5000 Unit(s) SubCutaneous every 12 hours    MEDICATIONS  (PRN):  acetaminophen     Tablet .. 650 milliGRAM(s) Oral every 6 hours PRN Temp greater or equal to 38.5C (101.3F)    HEALTH ISSUES - PROBLEM Dx:  Palliative care by specialist    Urinary tract infection with fever  Fever

## 2022-08-12 NOTE — CONSULT NOTE ADULT - SUBJECTIVE AND OBJECTIVE BOX
YOMILOREE  89y, Female  Allergy: No Known Allergies    **WORKING NOTE****  CHIEF COMPLAINT: AMS, fever, COVID+      HPI:  89 year old female with a history of HTN and HLD presents to the ED from [facility] with altered mental status and fever. On presentation, pt minimally responsive, only gives name, unable to collect history. SPt was noted to be not herself, not recognizing her daughter at the NH, also spiked a fever at NH to 103 so brought to ED. As per son, at baseline pt ~AAO*2, has had progressive functional/cognitive decline over the last few months.     In the ED, pt given a dose of cefepime  Vitals: T(F): 102.3 HR: 85 BP: 132/60 RR: 20 SpO2: 96%  Labs: significant for Na 123, bnp 3300, wbc 15, no bacteria    ECG: Sinus rhythm with PSVC  CXR: unread, on wet read questionable Left cpa opacity/effusion   (08/07)    FAMILY HISTORY:  Patient unable to provide medical history      PAST MEDICAL & SURGICAL HISTORY:  HTN (hypertension)  Femur neck fracture- right  History of hip surgery Right 2022    ROS  General: Denies rigors, night sweats  HEENT: Denies headache, rhinorrhea, sore throat, eye pain  CV: Denies CP, palpitations  PULM: Denies wheezing, hemoptysis  GI: Denies hematemesis, hematochezia, melena  : Denies discharge, hematuria  MSK: Denies arthralgias, myalgias  SKIN: Denies rash, lesions  NEURO: Denies paresthesias, weakness  PSYCH: Denies depression, anxiety    VITALS:  Vital Signs Last 24 Hrs  T(C): 36.1 (12 Aug 2022 04:13), Max: 36.9 (11 Aug 2022 20:18)  T(F): 96.9 (12 Aug 2022 04:13), Max: 98.4 (11 Aug 2022 20:18)  HR: 86 (12 Aug 2022 04:13) (70 - 86)  BP: 128/81 (12 Aug 2022 04:13) (128/81 - 160/67)  BP(mean): --  RR: 18 (12 Aug 2022 04:13) (18 - 18)  SpO2: --    Parameters below as of 11 Aug 2022 13:13  Patient On (Oxygen Delivery Method): room air        PHYSICAL EXAM:  Gen: NAD, resting in bed  HEENT: Normocephalic, atraumatic  Neck: supple, no lymphadenopathy  CV: Regular rate & regular rhythm  Lungs: decreased BS at bases  Abdomen: Soft, BS present  Ext: Warm, well perfused  Neuro: non focal, awake  Skin: no rash, no erythema    TESTS & MEASUREMENTS:                        12.5   2.88  )-----------( 185      ( 12 Aug 2022 06:59 )             37.4     08-12    134<L>  |  95<L>  |  19  ----------------------------<  81  5.0   |  25  |  1.0    Na was 126-->127--129-->now 134  Ca    9.2      12 Aug 2022 06:59  Mg     1.9     08-12    TPro  6.5  /  Alb  3.6  /  TBili  0.3  /  DBili  x   /  AST  26  /  ALT  10  /  AlkPhos  55  08-12      LIVER FUNCTIONS - ( 12 Aug 2022 06:59 )  Alb: 3.6 g/dL / Pro: 6.5 g/dL / ALK PHOS: 55 U/L / ALT: 10 U/L / AST: 26 U/L / GGT: x               Culture - Urine (collected 08-07-22 @ 17:01)  Source: Clean Catch Clean Catch (Midstream)  Final Report (08-09-22 @ 16:43):    >100,000 CFU/ml Escherichia coli  Organism: Escherichia coli (08-09-22 @ 16:43)  Organism: Escherichia coli (08-09-22 @ 16:43)      -  Amikacin: S <=16      -  Amoxicillin/Clavulanic Acid: S <=8/4      -  Ampicillin: R >16 These ampicillin results predict results for amoxicillin      -  Ampicillin/Sulbactam: I 16/8 Enterobacter, Klebsiella aerogenes, Citrobacter, and Serratia may develop resistance during prolonged therapy (3-4 days)      -  Aztreonam: S <=4      -  Cefazolin: S <=2 (MIC_CL_COM_ENTERIC_CEFAZU) For uncomplicated UTI with K. pneumoniae, E. coli, or P. mirablis: MEGAN <=16 is sensitive and MEGAN >=32 is resistant. This also predicts results for oral agents cefaclor, cefdinir, cefpodoxime, cefprozil, cefuroxime axetil, cephalexin and locarbef for uncomplicated UTI. Note that some isolates may be susceptible to these agents while testing resistant to cefazolin.      -  Cefepime: S <=2      -  Cefoxitin: S <=8      -  Ceftriaxone: S <=1 Enterobacter, Klebsiella aerogenes, Citrobacter, and Serratia may develop resistance during prolonged therapy      -  Ciprofloxacin: S <=0.25      -  Ertapenem: S <=0.5      -  Gentamicin: S <=2      -  Imipenem: S <=1      -  Levofloxacin: S <=0.5      -  Meropenem: S <=1      -  Nitrofurantoin: S <=32 Should not be used to treat pyelonephritis      -  Piperacillin/Tazobactam: S <=8      -  Tigecycline: S <=2      -  Tobramycin: S <=2      -  Trimethoprim/Sulfamethoxazole: R >2/38      Method Type: MEGAN    Culture - Blood (collected 08-07-22 @ 17:01)  Source: .Blood Blood-Peripheral  Preliminary Report (08-09-22 @ 01:03):    No growth to date.    Culture - Blood (collected 08-07-22 @ 17:01)  Source: .Blood Blood-Peripheral  Preliminary Report (08-09-22 @ 01:03):    No growth to date.    Lactate, Blood: 1.4 mmol/L (08-07-22 @ 17:01)  Blood Gas Venous - Lactate: 1.40 mmol/L (08-07-22 @ 16:53)      INFECTIOUS DISEASES TESTING  COVID-19 PCR positive Aug 10  negative on Aug 7    RADIOLOGY & ADDITIONAL TESTS:  I have personally reviewed the last Chest x ray    Xray Chest 1 View AP/PA:                    PROCEDURE DATE:  08/11/2022    Impression:    Stable reticular opacities. No pneumothorax    CT  CT Chest No Cont (08.08.22 @ 17:42)    IMPRESSION:  No focal consolidation to suggest pneumonia      CT Head No Cont (08.08.22 @ 03:57)   IMPRESSION:      No evidence of acute intracranial pathology.    Chronic microvascular ischemic changes.      CARDIOLOGY TESTING  12 Lead ECG:   Ventricular Rate 79 BPM    Atrial Rate 79 BPM    P-R Interval 198 ms    QRS Duration 78 ms    Q-T Interval 384 ms    QTC Calculation(Bazett) 440 ms    P Axis 84 degrees    R Axis -7 degrees    T Axis 5 degrees    Diagnosis Line Sinus rhythm with Premature supraventricular complexes  Nonspecific ST abnormality  Abnormal ECG    Confirmed by Mukesh Douglass ([id number]) on 8/7/2022 10:33:20 PM (08-07-22 @ 18:19)      MEDICATIONS  cefpodoxime (vantin) 200 mg q12hr for 5 days (started 08/10)  heparin   Injectable 5000 mg      ANTIBIOTICS:  cefpodoxime (Vantin) 200 mg q12hr for 5 days (started 08/10)      ASSESSMENT  89y F admitted with METABOLIC ENCEPHALOPATHY; FEVER; HYPONATREMIA      HTN (hypertension)    Femur neck fracture        PROBLEMS  #metabolic encephalopathy 2/2 complicated e.coli UTI  - E.coli is pansensitive, pt is currently on: cefpodoxime (vantin) 200 mg q12hr for 5 days (started 08/10)  - fever on admission 102, afebrile now  - WBC trending down 2.8 (08/12)  - pt is Aaox2 at baseline    # Mild COVID-19 positive  - Pt tested negative on 08/07, positive on 08/10  - pt is afebrile, has a mild cough, but no oxygen requirements- is on Room Air  - WBC is low- 2.8  - CXR is negative- Stable reticular opacities. No pneumothorax      RECOMMENDATIONS  - f/u Legionella, strep,   - Continue    LOREE CELAYA  89y, Female  Allergy: No Known Allergies    CHIEF COMPLAINT: AMS, fever, COVID+      HPI:  89 year old female with a history of HTN and HLD presents to the ED from [facility] with altered mental status and fever. On presentation, pt minimally responsive, only gives name, unable to collect history. SPt was noted to be not herself, not recognizing her daughter at the NH, also spiked a fever at NH to 103 so brought to ED. As per son, at baseline pt ~AAO*2, has had progressive functional/cognitive decline over the last few months.     In the ED, pt given a dose of cefepime  Vitals: T(F): 102.3 HR: 85 BP: 132/60 RR: 20 SpO2: 96%  Labs: significant for Na 123, bnp 3300, wbc 15, no bacteria    ECG: Sinus rhythm with PSVC  CXR: unread, on wet read questionable Left cpa opacity/effusion   (08/07)    FAMILY HISTORY:  Patient unable to provide medical history      PAST MEDICAL & SURGICAL HISTORY:  HTN (hypertension)  Femur neck fracture- right  History of hip surgery Right 2022    ROS  General: Denies rigors, night sweats  HEENT: Denies headache, rhinorrhea, sore throat, eye pain  CV: Denies CP, palpitations  PULM: Denies wheezing, hemoptysis  GI: Denies hematemesis, hematochezia, melena  : Denies discharge, hematuria  MSK: Denies arthralgias, myalgias  SKIN: Denies rash, lesions  NEURO: Denies paresthesias, weakness  PSYCH: Denies depression, anxiety    VITALS:  Vital Signs Last 24 Hrs  T(C): 36.1 (12 Aug 2022 04:13), Max: 36.9 (11 Aug 2022 20:18)  T(F): 96.9 (12 Aug 2022 04:13), Max: 98.4 (11 Aug 2022 20:18)  HR: 86 (12 Aug 2022 04:13) (70 - 86)  BP: 128/81 (12 Aug 2022 04:13) (128/81 - 160/67)  BP(mean): --  RR: 18 (12 Aug 2022 04:13) (18 - 18)  SpO2: --    Parameters below as of 11 Aug 2022 13:13  Patient On (Oxygen Delivery Method): room air        PHYSICAL EXAM:  Gen: NAD, resting in bed Aox1  HEENT: Normocephalic, atraumatic  Neck: supple, no lymphadenopathy  CV: RRR  Lungs: decreased BS at bases  Abdomen: Soft, BS present  Ext: Warm, well perfused  Neuro: non focal, awake  Skin: no rash, no erythema    TESTS & MEASUREMENTS:                        12.5   2.88  )-----------( 185      ( 12 Aug 2022 06:59 )             37.4     08-12    134<L>  |  95<L>  |  19  ----------------------------<  81  5.0   |  25  |  1.0    Na was 126-->127--129-->now 134  Ca    9.2      12 Aug 2022 06:59  Mg     1.9     08-12    TPro  6.5  /  Alb  3.6  /  TBili  0.3  /  DBili  x   /  AST  26  /  ALT  10  /  AlkPhos  55  08-12      LIVER FUNCTIONS - ( 12 Aug 2022 06:59 )  Alb: 3.6 g/dL / Pro: 6.5 g/dL / ALK PHOS: 55 U/L / ALT: 10 U/L / AST: 26 U/L / GGT: x               Culture - Urine (collected 08-07-22 @ 17:01)  Source: Clean Catch Clean Catch (Midstream)  Final Report (08-09-22 @ 16:43):    >100,000 CFU/ml Escherichia coli  Organism: Escherichia coli (08-09-22 @ 16:43)  Organism: Escherichia coli (08-09-22 @ 16:43)      -  Amikacin: S <=16      -  Amoxicillin/Clavulanic Acid: S <=8/4      -  Ampicillin: R >16 These ampicillin results predict results for amoxicillin      -  Ampicillin/Sulbactam: I 16/8 Enterobacter, Klebsiella aerogenes, Citrobacter, and Serratia may develop resistance during prolonged therapy (3-4 days)      -  Aztreonam: S <=4      -  Cefazolin: S <=2 (MIC_CL_COM_ENTERIC_CEFAZU) For uncomplicated UTI with K. pneumoniae, E. coli, or P. mirablis: MEGAN <=16 is sensitive and MEGAN >=32 is resistant. This also predicts results for oral agents cefaclor, cefdinir, cefpodoxime, cefprozil, cefuroxime axetil, cephalexin and locarbef for uncomplicated UTI. Note that some isolates may be susceptible to these agents while testing resistant to cefazolin.      -  Cefepime: S <=2      -  Cefoxitin: S <=8      -  Ceftriaxone: S <=1 Enterobacter, Klebsiella aerogenes, Citrobacter, and Serratia may develop resistance during prolonged therapy      -  Ciprofloxacin: S <=0.25      -  Ertapenem: S <=0.5      -  Gentamicin: S <=2      -  Imipenem: S <=1      -  Levofloxacin: S <=0.5      -  Meropenem: S <=1      -  Nitrofurantoin: S <=32 Should not be used to treat pyelonephritis      -  Piperacillin/Tazobactam: S <=8      -  Tigecycline: S <=2      -  Tobramycin: S <=2      -  Trimethoprim/Sulfamethoxazole: R >2/38      Method Type: MEGAN    Culture - Blood (collected 08-07-22 @ 17:01)  Source: .Blood Blood-Peripheral  Preliminary Report (08-09-22 @ 01:03):    No growth to date.    Culture - Blood (collected 08-07-22 @ 17:01)  Source: .Blood Blood-Peripheral  Preliminary Report (08-09-22 @ 01:03):    No growth to date.    Lactate, Blood: 1.4 mmol/L (08-07-22 @ 17:01)  Blood Gas Venous - Lactate: 1.40 mmol/L (08-07-22 @ 16:53)      INFECTIOUS DISEASES TESTING  COVID-19 PCR positive Aug 10  negative on Aug 7    RADIOLOGY & ADDITIONAL TESTS:  I have personally reviewed the last Chest x ray    Xray Chest 1 View AP/PA:                    PROCEDURE DATE:  08/11/2022    Impression:    Stable reticular opacities. No pneumothorax    CT  CT Chest No Cont (08.08.22 @ 17:42)    IMPRESSION:  No focal consolidation to suggest pneumonia      CT Head No Cont (08.08.22 @ 03:57)   IMPRESSION:      No evidence of acute intracranial pathology.    Chronic microvascular ischemic changes.      CARDIOLOGY TESTING  12 Lead ECG:   Ventricular Rate 79 BPM    Atrial Rate 79 BPM    P-R Interval 198 ms    QRS Duration 78 ms    Q-T Interval 384 ms    QTC Calculation(Bazett) 440 ms    P Axis 84 degrees    R Axis -7 degrees    T Axis 5 degrees    Diagnosis Line Sinus rhythm with Premature supraventricular complexes  Nonspecific ST abnormality  Abnormal ECG    Confirmed by Mukesh Douglass ([id number]) on 8/7/2022 10:33:20 PM (08-07-22 @ 18:19)      MEDICATIONS  cefpodoxime (vantin) 200 mg q12hr for 5 days (started 08/10)  heparin   Injectable 5000 mg      ANTIBIOTICS:  cefpodoxime (Vantin) 200 mg q12hr for 5 days (started 08/10)      ASSESSMENT  89y F admitted with metabolic encephalopathy complicated UTI. Upon discharge back to her nursing home, she was found to be COVID-19 positive (08/10), now moved to .     PROBLEMS  #metabolic encephalopathy 2/2 complicated e. coli UTI  - E.coli is pansensitive, pt is currently on: cefpodoxime (vantin) 200 mg q12hr for 5 days (started 08/10)  - fever on admission 102, afebrile now  - Na on admission 123, trending up now 134 (08/12)  - WBC trending down 2.8 (08/12)  - pt is Aaox2 at baseline, on exam Aox1    # Mild COVID-19   - Pt tested negative on 08/07, positive on 08/10  - pt is afebrile, has a mild cough, but no oxygen requirements- is on RA  - WBC is low- 2.8 (08/12)  - CXR is negative (08/11)- no ground glass opacities seen, stable opacities.      RECOMMENDATIONS  - f/u Legionella, strep, Blood Cx, Ucx  - Abx for UTI; d/c Vantin, recommend Rocephin 1g q24hr  - Abx for COVID- Remdesivir 200 mg (day 1), 100 mg (day 2 and 3) for 3 days total

## 2022-08-13 LAB
CULTURE RESULTS: SIGNIFICANT CHANGE UP
CULTURE RESULTS: SIGNIFICANT CHANGE UP
FERRITIN SERPL-MCNC: 312 NG/ML — HIGH (ref 15–150)
PROCALCITONIN SERPL-MCNC: 0.11 NG/ML — HIGH (ref 0.02–0.1)
SPECIMEN SOURCE: SIGNIFICANT CHANGE UP
SPECIMEN SOURCE: SIGNIFICANT CHANGE UP

## 2022-08-13 PROCEDURE — 99232 SBSQ HOSP IP/OBS MODERATE 35: CPT

## 2022-08-13 RX ORDER — REMDESIVIR 5 MG/ML
100 INJECTION INTRAVENOUS EVERY 24 HOURS
Refills: 0 | Status: DISCONTINUED | OUTPATIENT
Start: 2022-08-14 | End: 2022-08-15

## 2022-08-13 RX ORDER — CEFTRIAXONE 500 MG/1
1000 INJECTION, POWDER, FOR SOLUTION INTRAMUSCULAR; INTRAVENOUS EVERY 24 HOURS
Refills: 0 | Status: COMPLETED | OUTPATIENT
Start: 2022-08-13 | End: 2022-08-15

## 2022-08-13 RX ADMIN — REMDESIVIR 500 MILLIGRAM(S): 5 INJECTION INTRAVENOUS at 18:32

## 2022-08-13 RX ADMIN — CEFTRIAXONE 100 MILLIGRAM(S): 500 INJECTION, POWDER, FOR SOLUTION INTRAMUSCULAR; INTRAVENOUS at 14:03

## 2022-08-13 RX ADMIN — Medication 200 MILLIGRAM(S): at 05:16

## 2022-08-13 RX ADMIN — HEPARIN SODIUM 5000 UNIT(S): 5000 INJECTION INTRAVENOUS; SUBCUTANEOUS at 05:17

## 2022-08-13 RX ADMIN — HEPARIN SODIUM 5000 UNIT(S): 5000 INJECTION INTRAVENOUS; SUBCUTANEOUS at 18:32

## 2022-08-13 NOTE — PROGRESS NOTE ADULT - ASSESSMENT
Patient seen and examined independently of resident. My addendum supersedes resident notation. Case discussed with housestaff, nursing and patient    89 yo F with hx of HTN, HLD, s/p recent R hip fx d/t fall s/p ORIF initially admitted with acute metabolic encephalopathy with fever to 103 and found to have abn UA and low sodium level and elevated creatinine.  Mentation and electrolytes improved and plan was to transfer back to [facility] when covid test returned and it was positive.  Now remains hospitalized on covid isolation; cannot return back to NH as yet per  / SW.    - Metabolic encephalopathy; resolved.  Back at baseline mentation  - E.coli UTI. - appreciated id f/u and recommendation- Rocephin 1g q24hr  patient quite tender on exam still, and urine remains quite potent and concentrated  - Covid +.  Not hypoxic.  + Leukopenia.  IV Remdesivir x 3 days; maintain covid precautions.  continue to trend o2 requirements  dehydration, encourage po intake, + gentle ivh  hyponatremic, likely 2/2 hypovolemia and lack of appropriate solute intake and home overdiuresis, gentle ivh +, hold home diuretic  guarded prognosis      DVT prophylaxis, nutrition to f/up and fall precautions.    Provider Hand off  Pending- rocephin, remdesivir, ivh, encourage increased oral intake, involve PT, f/u sodium

## 2022-08-13 NOTE — PROGRESS NOTE ADULT - SUBJECTIVE AND OBJECTIVE BOX
MEISONNYPRINCE LOREE  89y  Female      Patient is a 89y old  Female who presents with a chief complaint of AMS (11 Aug 2022 14:38)      INTERVAL HPI/OVERNIGHT EVENTS: confused. staff reports eating poorly      REVIEW OF SYSTEMS:  limited as above  All other review of systems negative    T(C): 35.9 (08-13-22 @ 13:19), Max: 35.9 (08-13-22 @ 13:19)  HR: 75 (08-13-22 @ 13:19) (75 - 84)  BP: 139/71 (08-13-22 @ 13:19) (129/58 - 139/71)  RR: 16 (08-13-22 @ 13:19) (16 - 18)  SpO2: 96% (08-13-22 @ 13:19) (96% - 96%)  Wt(kg): --Vital Signs Last 24 Hrs  T(C): 35.9 (13 Aug 2022 13:19), Max: 35.9 (13 Aug 2022 13:19)  T(F): 96.6 (13 Aug 2022 13:19), Max: 96.6 (13 Aug 2022 13:19)  HR: 75 (13 Aug 2022 13:19) (75 - 84)  BP: 139/71 (13 Aug 2022 13:19) (129/58 - 139/71)  BP(mean): --  RR: 16 (13 Aug 2022 13:19) (16 - 18)  SpO2: 96% (13 Aug 2022 13:19) (96% - 96%)            PHYSICAL EXAM:  GENERAL: NAD  PSYCH: no agitation, forgetful  NERVOUS SYSTEM:  Alert , hard of hearing, no new focal deficits  PULMONARY: Clear to percussion bilaterally; No rales, rhonchi, wheezing, or rubs  CARDIOVASCULAR: Regular rate and rhythm; No murmurs, rubs, or gallops  GI: Soft, Nontender, Nondistended; Bowel sounds present   + suprapubic tenderness  EXTREMITIES:  2+ Peripheral Pulses, No clubbing, cyanosis, or edema  SKIN dry    Consultant(s) Notes Reviewed:  [x ] YES  [ ] NO    Discussed with Consultants/Other Providers [ x] YES     LABS                          12.5   2.88  )-----------( 185      ( 12 Aug 2022 06:59 )             37.4     08-12    134<L>  |  95<L>  |  19  ----------------------------<  81  5.0   |  25  |  1.0    Ca    9.2      12 Aug 2022 06:59  Mg     1.9     08-12    TPro  6.5  /  Alb  3.6  /  TBili  0.3  /  DBili  x   /  AST  26  /  ALT  10  /  AlkPhos  55  08-12          Lactate Trend        CAPILLARY BLOOD GLUCOSE            RADIOLOGY & ADDITIONAL TESTS:    Imaging Personally Reviewed:  [ ] YES  [ ] NO    HEALTH ISSUES - PROBLEM Dx:  Palliative care by specialist    Urinary tract infection with fever  Fever    A fever is an increase in the body&#x27;s temperature above 100.4°F (38°C) or higher. In adults and children older than three months, a brief mild or moderate fever generally has no long-term effect, and it usually does not require treatment. Many times, fevers are the result of viral infections, which are self-resolving.  However, certain symptoms or diagnostic tests may suggest a bacterial infection that may respond to antibiotics. Take medications as directed by your health care provider.    SEEK IMMEDIATE MEDICAL CARE IF YOU OR YOUR CHILD HAVE ANY OF THE FOLLOWING SYMPTOMS : shortness of breath, seizure, rash/stiff neck/headache, severe abdominal pain, persistent vomiting, any signs of dehydration, or if your child has a fever for over five (5) days.    Debility    Altered mental status    Advanced care planning/counseling discussion

## 2022-08-14 LAB
ALBUMIN SERPL ELPH-MCNC: 3.4 G/DL — LOW (ref 3.5–5.2)
ALP SERPL-CCNC: 47 U/L — SIGNIFICANT CHANGE UP (ref 30–115)
ALT FLD-CCNC: 8 U/L — SIGNIFICANT CHANGE UP (ref 0–41)
ANION GAP SERPL CALC-SCNC: 13 MMOL/L — SIGNIFICANT CHANGE UP (ref 7–14)
AST SERPL-CCNC: 21 U/L — SIGNIFICANT CHANGE UP (ref 0–41)
BILIRUB SERPL-MCNC: <0.2 MG/DL — SIGNIFICANT CHANGE UP (ref 0.2–1.2)
BUN SERPL-MCNC: 21 MG/DL — HIGH (ref 10–20)
CALCIUM SERPL-MCNC: 8.9 MG/DL — SIGNIFICANT CHANGE UP (ref 8.5–10.1)
CHLORIDE SERPL-SCNC: 101 MMOL/L — SIGNIFICANT CHANGE UP (ref 98–110)
CO2 SERPL-SCNC: 28 MMOL/L — SIGNIFICANT CHANGE UP (ref 17–32)
CREAT SERPL-MCNC: 1.1 MG/DL — SIGNIFICANT CHANGE UP (ref 0.7–1.5)
EGFR: 47 ML/MIN/1.73M2 — LOW
GLUCOSE SERPL-MCNC: 82 MG/DL — SIGNIFICANT CHANGE UP (ref 70–99)
HCT VFR BLD CALC: 33.9 % — LOW (ref 37–47)
HGB BLD-MCNC: 11.2 G/DL — LOW (ref 12–16)
MAGNESIUM SERPL-MCNC: 1.7 MG/DL — LOW (ref 1.8–2.4)
MCHC RBC-ENTMCNC: 28.6 PG — SIGNIFICANT CHANGE UP (ref 27–31)
MCHC RBC-ENTMCNC: 33 G/DL — SIGNIFICANT CHANGE UP (ref 32–37)
MCV RBC AUTO: 86.5 FL — SIGNIFICANT CHANGE UP (ref 81–99)
NRBC # BLD: 0 /100 WBCS — SIGNIFICANT CHANGE UP (ref 0–0)
PLATELET # BLD AUTO: 218 K/UL — SIGNIFICANT CHANGE UP (ref 130–400)
POTASSIUM SERPL-MCNC: 4.7 MMOL/L — SIGNIFICANT CHANGE UP (ref 3.5–5)
POTASSIUM SERPL-SCNC: 4.7 MMOL/L — SIGNIFICANT CHANGE UP (ref 3.5–5)
PROT SERPL-MCNC: 5.8 G/DL — LOW (ref 6–8)
RBC # BLD: 3.92 M/UL — LOW (ref 4.2–5.4)
RBC # FLD: 13.1 % — SIGNIFICANT CHANGE UP (ref 11.5–14.5)
SODIUM SERPL-SCNC: 142 MMOL/L — SIGNIFICANT CHANGE UP (ref 135–146)
WBC # BLD: 3.14 K/UL — LOW (ref 4.8–10.8)
WBC # FLD AUTO: 3.14 K/UL — LOW (ref 4.8–10.8)

## 2022-08-14 PROCEDURE — 99232 SBSQ HOSP IP/OBS MODERATE 35: CPT

## 2022-08-14 RX ORDER — SODIUM CHLORIDE 9 MG/ML
1000 INJECTION, SOLUTION INTRAVENOUS
Refills: 0 | Status: DISCONTINUED | OUTPATIENT
Start: 2022-08-14 | End: 2022-08-15

## 2022-08-14 RX ORDER — MAGNESIUM SULFATE 500 MG/ML
2 VIAL (ML) INJECTION ONCE
Refills: 0 | Status: COMPLETED | OUTPATIENT
Start: 2022-08-14 | End: 2022-08-14

## 2022-08-14 RX ADMIN — HEPARIN SODIUM 5000 UNIT(S): 5000 INJECTION INTRAVENOUS; SUBCUTANEOUS at 18:14

## 2022-08-14 RX ADMIN — SODIUM CHLORIDE 75 MILLILITER(S): 9 INJECTION, SOLUTION INTRAVENOUS at 21:27

## 2022-08-14 RX ADMIN — HEPARIN SODIUM 5000 UNIT(S): 5000 INJECTION INTRAVENOUS; SUBCUTANEOUS at 05:23

## 2022-08-14 RX ADMIN — REMDESIVIR 500 MILLIGRAM(S): 5 INJECTION INTRAVENOUS at 18:14

## 2022-08-14 RX ADMIN — CEFTRIAXONE 100 MILLIGRAM(S): 500 INJECTION, POWDER, FOR SOLUTION INTRAMUSCULAR; INTRAVENOUS at 09:11

## 2022-08-14 RX ADMIN — Medication 16.67 GRAM(S): at 13:25

## 2022-08-14 NOTE — PROGRESS NOTE ADULT - SUBJECTIVE AND OBJECTIVE BOX
LOREE CELAYA  89y  Female      Patient is a 89y old  Female who presents with a chief complaint of ams (13 Aug 2022 17:52)      INTERVAL HPI/OVERNIGHT EVENTS: forgetful. not eating well      REVIEW OF SYSTEMS:  limited as above  All other review of systems negative    T(C): 36 (08-14-22 @ 13:13), Max: 36.1 (08-14-22 @ 06:06)  HR: 78 (08-14-22 @ 13:13) (78 - 83)  BP: 122/57 (08-14-22 @ 13:13) (122/57 - 131/58)  RR: 16 (08-14-22 @ 13:13) (16 - 18)  SpO2: 100% (08-14-22 @ 13:13) (96% - 100%)  Wt(kg): --Vital Signs Last 24 Hrs  T(C): 36 (14 Aug 2022 13:13), Max: 36.1 (14 Aug 2022 06:06)  T(F): 96.8 (14 Aug 2022 13:13), Max: 97 (14 Aug 2022 06:06)  HR: 78 (14 Aug 2022 13:13) (78 - 83)  BP: 122/57 (14 Aug 2022 13:13) (122/57 - 131/58)  BP(mean): --  RR: 16 (14 Aug 2022 13:13) (16 - 18)  SpO2: 100% (14 Aug 2022 13:13) (96% - 100%)            PHYSICAL EXAM:  GENERAL: frail  HEENT hard of hearing  PSYCH: no agitation, baseline mentation is forgetful  NERVOUS SYSTEM:  Alert & Oriented X1, no new focal deficits  PULMONARY: Clear to percussion bilaterally; No rales, rhonchi, wheezing, or rubs  CARDIOVASCULAR: Regular rate and rhythm; No murmurs, rubs, or gallops  GI: Soft, Nontender, Nondistended; Bowel sounds present  EXTREMITIES:  2+ Peripheral Pulses, No clubbing, cyanosis, or edema  SKIN dry    Consultant(s) Notes Reviewed:  [x ] YES  [ ] NO    Discussed with Consultants/Other Providers [ x] YES     LABS                          11.2   3.14  )-----------( 218      ( 14 Aug 2022 06:19 )             33.9     08-14    142  |  101  |  21<H>  ----------------------------<  82  4.7   |  28  |  1.1    Ca    8.9      14 Aug 2022 06:19  Mg     1.7     08-14    TPro  5.8<L>  /  Alb  3.4<L>  /  TBili  <0.2  /  DBili  x   /  AST  21  /  ALT  8   /  AlkPhos  47  08-14          Lactate Trend        CAPILLARY BLOOD GLUCOSE            RADIOLOGY & ADDITIONAL TESTS:    Imaging Personally Reviewed:  [ ] YES  [ ] NO    HEALTH ISSUES - PROBLEM Dx:

## 2022-08-14 NOTE — PROGRESS NOTE ADULT - ASSESSMENT
Patient seen and examined independently of resident. My addendum supersedes resident notation. Case discussed with housestaff, nursing and patient    89 yo F with hx of HTN, HLD, s/p recent R hip fx d/t fall s/p ORIF initially admitted with acute metabolic encephalopathy with fever to 103 and found to have abn UA and low sodium level and elevated creatinine.  Mentation and electrolytes improved and plan was to transfer back to [facility] when covid test returned and it was positive.  Now remains hospitalized on covid isolation; cannot return back to NH as yet per  / SW.    - Metabolic encephalopathy; resolved.  Back at baseline mentation  - E.coli UTI. - appreciated id f/u and recommendation- Rocephin 1g q24hr  patient quite tender on exam still, and urine remains quite potent and concentrated  - Covid +.  Not hypoxic.  + Leukopenia.  IV Remdesivir x 3 days; maintain covid precautions.  continue to trend o2 requirements  dehydration, encourage po intake, + gentle ivh  hyponatremic, likely 2/2 hypovolemia and lack of appropriate solute intake and home overdiuresis, gentle ivh +, hold home diuretic  guarded prognosis      DVT prophylaxis, nutrition to f/up and fall precautions.    Provider Hand off  Pending- rocephin, remdesivir, ivh, encourage increased oral intake, involve PT, f/u sodium. tomorrow INVOLVE PT, and obtain dietician eval, check orthostatic VS x 1

## 2022-08-15 LAB
ALBUMIN SERPL ELPH-MCNC: 3 G/DL — LOW (ref 3.5–5.2)
ALP SERPL-CCNC: 46 U/L — SIGNIFICANT CHANGE UP (ref 30–115)
ALT FLD-CCNC: 7 U/L — SIGNIFICANT CHANGE UP (ref 0–41)
ANION GAP SERPL CALC-SCNC: 10 MMOL/L — SIGNIFICANT CHANGE UP (ref 7–14)
AST SERPL-CCNC: 17 U/L — SIGNIFICANT CHANGE UP (ref 0–41)
BASOPHILS # BLD AUTO: 0.01 K/UL — SIGNIFICANT CHANGE UP (ref 0–0.2)
BASOPHILS NFR BLD AUTO: 0.3 % — SIGNIFICANT CHANGE UP (ref 0–1)
BILIRUB SERPL-MCNC: <0.2 MG/DL — SIGNIFICANT CHANGE UP (ref 0.2–1.2)
BUN SERPL-MCNC: 17 MG/DL — SIGNIFICANT CHANGE UP (ref 10–20)
CALCIUM SERPL-MCNC: 8.5 MG/DL — SIGNIFICANT CHANGE UP (ref 8.5–10.1)
CHLORIDE SERPL-SCNC: 96 MMOL/L — LOW (ref 98–110)
CO2 SERPL-SCNC: 27 MMOL/L — SIGNIFICANT CHANGE UP (ref 17–32)
CREAT SERPL-MCNC: 0.9 MG/DL — SIGNIFICANT CHANGE UP (ref 0.7–1.5)
EGFR: 59 ML/MIN/1.73M2 — LOW
EOSINOPHIL # BLD AUTO: 0.09 K/UL — SIGNIFICANT CHANGE UP (ref 0–0.7)
EOSINOPHIL NFR BLD AUTO: 2.6 % — SIGNIFICANT CHANGE UP (ref 0–8)
GLUCOSE SERPL-MCNC: 89 MG/DL — SIGNIFICANT CHANGE UP (ref 70–99)
HCT VFR BLD CALC: 32.4 % — LOW (ref 37–47)
HGB BLD-MCNC: 10.7 G/DL — LOW (ref 12–16)
IMM GRANULOCYTES NFR BLD AUTO: 0.6 % — HIGH (ref 0.1–0.3)
LYMPHOCYTES # BLD AUTO: 1.29 K/UL — SIGNIFICANT CHANGE UP (ref 1.2–3.4)
LYMPHOCYTES # BLD AUTO: 37.5 % — SIGNIFICANT CHANGE UP (ref 20.5–51.1)
MCHC RBC-ENTMCNC: 28.6 PG — SIGNIFICANT CHANGE UP (ref 27–31)
MCHC RBC-ENTMCNC: 33 G/DL — SIGNIFICANT CHANGE UP (ref 32–37)
MCV RBC AUTO: 86.6 FL — SIGNIFICANT CHANGE UP (ref 81–99)
MONOCYTES # BLD AUTO: 0.43 K/UL — SIGNIFICANT CHANGE UP (ref 0.1–0.6)
MONOCYTES NFR BLD AUTO: 12.5 % — HIGH (ref 1.7–9.3)
NEUTROPHILS # BLD AUTO: 1.6 K/UL — SIGNIFICANT CHANGE UP (ref 1.4–6.5)
NEUTROPHILS NFR BLD AUTO: 46.5 % — SIGNIFICANT CHANGE UP (ref 42.2–75.2)
NRBC # BLD: 0 /100 WBCS — SIGNIFICANT CHANGE UP (ref 0–0)
PLATELET # BLD AUTO: 223 K/UL — SIGNIFICANT CHANGE UP (ref 130–400)
POTASSIUM SERPL-MCNC: 3.9 MMOL/L — SIGNIFICANT CHANGE UP (ref 3.5–5)
POTASSIUM SERPL-SCNC: 3.9 MMOL/L — SIGNIFICANT CHANGE UP (ref 3.5–5)
PROT SERPL-MCNC: 5.2 G/DL — LOW (ref 6–8)
RBC # BLD: 3.74 M/UL — LOW (ref 4.2–5.4)
RBC # FLD: 13.1 % — SIGNIFICANT CHANGE UP (ref 11.5–14.5)
SARS-COV-2 RNA SPEC QL NAA+PROBE: DETECTED
SODIUM SERPL-SCNC: 133 MMOL/L — LOW (ref 135–146)
WBC # BLD: 3.44 K/UL — LOW (ref 4.8–10.8)
WBC # FLD AUTO: 3.44 K/UL — LOW (ref 4.8–10.8)

## 2022-08-15 PROCEDURE — 99232 SBSQ HOSP IP/OBS MODERATE 35: CPT

## 2022-08-15 RX ORDER — MAGNESIUM SULFATE 500 MG/ML
2 VIAL (ML) INJECTION ONCE
Refills: 0 | Status: COMPLETED | OUTPATIENT
Start: 2022-08-15 | End: 2022-08-15

## 2022-08-15 RX ADMIN — CEFTRIAXONE 100 MILLIGRAM(S): 500 INJECTION, POWDER, FOR SOLUTION INTRAMUSCULAR; INTRAVENOUS at 08:59

## 2022-08-15 RX ADMIN — Medication 25 GRAM(S): at 08:59

## 2022-08-15 RX ADMIN — HEPARIN SODIUM 5000 UNIT(S): 5000 INJECTION INTRAVENOUS; SUBCUTANEOUS at 17:19

## 2022-08-15 RX ADMIN — HEPARIN SODIUM 5000 UNIT(S): 5000 INJECTION INTRAVENOUS; SUBCUTANEOUS at 05:02

## 2022-08-15 NOTE — PROGRESS NOTE ADULT - SUBJECTIVE AND OBJECTIVE BOX
MEIDEANNALOREE  89y, Female    All available historical data reviewed    OVERNIGHT EVENTS:  no fevers  opens eyes  does not follow commands    ROS:  unable to obtain history secondary to patient's mental status     VITALS:  T(F): 96.8, Max: 98 (08-14-22 @ 21:09)  HR: 65  BP: 138/52  RR: 19Vital Signs Last 24 Hrs  T(C): 36 (15 Aug 2022 05:31), Max: 36.7 (14 Aug 2022 21:09)  T(F): 96.8 (15 Aug 2022 05:31), Max: 98 (14 Aug 2022 21:09)  HR: 65 (15 Aug 2022 07:50) (65 - 78)  BP: 138/52 (15 Aug 2022 07:50) (122/57 - 172/74)  BP(mean): --  RR: 19 (15 Aug 2022 05:31) (16 - 19)  SpO2: 98% (15 Aug 2022 05:31) (98% - 100%)        TESTS & MEASUREMENTS:                        11.2   3.14  )-----------( 218      ( 14 Aug 2022 06:19 )             33.9     08-14    142  |  101  |  21<H>  ----------------------------<  82  4.7   |  28  |  1.1    Ca    8.9      14 Aug 2022 06:19  Mg     1.7     08-14    TPro  5.8<L>  /  Alb  3.4<L>  /  TBili  <0.2  /  DBili  x   /  AST  21  /  ALT  8   /  AlkPhos  47  08-14    LIVER FUNCTIONS - ( 14 Aug 2022 06:19 )  Alb: 3.4 g/dL / Pro: 5.8 g/dL / ALK PHOS: 47 U/L / ALT: 8 U/L / AST: 21 U/L / GGT: x                   RADIOLOGY & ADDITIONAL TESTS:  Personal review of radiological diagnostics performed  Echo and EKG results noted when applicable.     MEDICATIONS:  acetaminophen     Tablet .. 650 milliGRAM(s) Oral every 6 hours PRN  heparin   Injectable 5000 Unit(s) SubCutaneous every 12 hours  lactated ringers. 1000 milliLiter(s) IV Continuous <Continuous>  lactated ringers. 1000 milliLiter(s) IV Continuous <Continuous>  remdesivir  IVPB 100 milliGRAM(s) IV Intermittent every 24 hours  remdesivir  IVPB   IV Intermittent       ANTIBIOTICS:  remdesivir  IVPB 100 milliGRAM(s) IV Intermittent every 24 hours  remdesivir  IVPB   IV Intermittent

## 2022-08-15 NOTE — PROGRESS NOTE ADULT - ASSESSMENT
89 yo F with hx of HTN, HLD, s/p recent R hip fx d/t fall s/p ORIF initially admitted with acute metabolic encephalopathy with fever to 103 and found to have abn UA and low sodium level and elevated creatinine.  Mentation and electrolytes improved and plan was to transfer back to [facility] when covid test returned and it was positive.  Now remains hospitalized on covid isolation; cannot return back to NH as yet per  / SW.    - Metabolic encephalopathy; resolved.  Back at baseline mentation  - E.coli UTI. - appreciated id f/u and recommendation- Rocephin 1g q24hr  patient quite tender on exam still, and urine remains quite potent and concentrated  - Covid +.  Not hypoxic.  + Leukopenia.  IV Remdesivir x 3 days; maintain covid precautions.  continue to trend o2 requirements  dehydration, encourage po intake, + gentle ivh  hyponatremic, likely 2/2 hypovolemia and lack of appropriate solute intake and home overdiuresis, gentle ivh +, hold home diuretic  guarded prognosis      DVT prophylaxis, nutrition to f/up and fall precautions.    Provider Hand off  Pending- rocephin, remdesivir, ivh, encourage increased oral intake, involve PT, f/u sodium. tomorrow INVOLVE PT, and obtain dietician eval, check orthostatic VS x 1   89 yo F with hx of HTN, HLD, s/p recent R hip fx d/t fall s/p ORIF initially admitted with acute metabolic encephalopathy with fever to 103 and found to have abn UA and low sodium level and elevated creatinine.  Mentation and electrolytes improved and plan was to transfer back to [facility] when covid test returned and it was positive.  Now remains hospitalized on covid isolation; cannot return back to NH as yet per  / SW.      # Metabolic encephalopathy -resolved  - Back at baseline mentation    # E.coli UTI  - fever on admission 102, afebrile now  - E.coli is pansensitive  - Na on admission 123, trending up now 134 (08/12)  - WBC trending down 2.8 (08/12  - pt was on cefpodoxime (vantin) 200mg q12 x 5 days (started 8/10)  - ID on board: Rocephin 1g q24hr  - patient quite tender on exam still, and urine remains quite potent and concentrated    # Covid +  - Not hypoxic.   - Leukopenic  - ID on board: Remdesivir 200 mg (day 1), 100 mg (day 2 and 3) for 3 days total   - maintain covid precautions.  - continue to trend o2 requirements  - dehydration, encourage po intake, + gentle ivh  - hyponatremic, likely 2/2 hypovolemia and lack of appropriate solute intake and home overdiuresis, gentle ivh +, hold home diuretic    guarded prognosis      DVT prophylaxis, nutrition to f/up and fall precautions.    Provider Hand off  Pending- rocephin, remdesivir, ivh, encourage increased oral intake, involve PT, f/u sodium.   Today: INVOLVE PT, and obtain dietician eval, check orthostatic VS x 1   89 yo F with hx of HTN, HLD, s/p recent R hip fx d/t fall s/p ORIF initially admitted with acute metabolic encephalopathy with fever to 103 and found to have abn UA and low sodium level and elevated creatinine.  Mentation and electrolytes improved and plan was to transfer back to [facility] when covid test returned and it was positive.  Now remains hospitalized on covid isolation; cannot return back to NH as yet per  / SW.      # Metabolic encephalopathy -resolved  - Back at baseline mentation    # E.coli UTI  - fever on admission 102, afebrile now  - E.coli is pansensitive  - Na on admission 123, trending up now 134 (08/12)  - WBC trending down 2.8 (08/12  - pt was on cefpodoxime (vantin) 200mg q12 x 5 days (started 8/10)  - ID on board: Rocephin 1g q24hr  - patient slightly tender on exam  - urine remains quite potent and concentrated    # Covid +  - Not hypoxic  - Leukopenic  - ID on board: IV Remdesivir 200 mg (day 1), 100 mg (day 2 and 3) for 3 days total   - maintain covid precautions.  - continue to trend o2 requirements  - dehydration, encourage po intake, + gentle ivh  - hyponatremia now resolved (Na 142 on 8/14), likely 2/2 hypovolemia and lack of appropriate solute intake and home overdiuresis, gentle ivh +, hold home diuretic        DVT prophylaxis, nutrition to f/up and fall precautions.  Guarded prognosis    Provider Hand off  Pending- rocephin, remdesivir, ivh, encourage increased oral intake, involve PT.   Today: INVOLVE PT, and obtain dietician eval, check orthostatic VS x 1   89 yo F with hx of HTN, HLD, s/p recent R hip fx d/t fall s/p ORIF initially admitted with acute metabolic encephalopathy with fever to 103 and found to have abn UA and low sodium level and elevated creatinine.  Mentation and electrolytes improved and plan was to transfer back to [facility] when covid test returned and it was positive.  Now remains hospitalized on covid isolation; cannot return back to NH as yet per  / SW.      # Metabolic encephalopathy -resolved  - Back at baseline mentation    # E.coli UTI  - fever on admission 102, afebrile now  - E.coli is pansensitive  - Na on admission 123, trending up now 134 (08/12)  - WBC trending down 2.8 (08/12  - pt was on cefpodoxime (vantin) 200mg q12 x 5 days (started 8/10)  - ID on board: Rocephin 1g q24hr > per ID 8/15: hold Rocephin for now  - patient slightly tender on exam  - urine remains quite potent and concentrated    # Covid +  - Not hypoxic  - Leukopenic  - ID on board: IV Remdesivir 200 mg (day 1), 100 mg (day 2 and 3) for 3 days total   - maintain covid precautions.  - continue to trend o2 requirements  - dehydration, encourage po intake, + gentle ivh  - hyponatremia now resolved (Na 142 on 8/14), likely 2/2 hypovolemia and lack of appropriate solute intake and home overdiuresis, gentle ivh +, hold home diuretic        DVT prophylaxis, nutrition to f/up and fall precautions.  Guarded prognosis    Provider Hand off  Pending- rocephin, remdesivir, ivh, encourage increased oral intake, involve PT.   Today: INVOLVE PT, and obtain dietician eval, check orthostatic VS x 1

## 2022-08-15 NOTE — PROGRESS NOTE ADULT - SUBJECTIVE AND OBJECTIVE BOX
LOREE CELAYA 89y Female  MRN#: [id number]   CODE STATUS:________    Hospital Day: 8d      INTERVAL HPI AND OVERNIGHT EVENTS:  Patient was examined and seen at bedside and is resting comfortably in bed and reports no issues or overnight events.    SUBJECTIVE  HPI  CC: "was not herself"   HPI: Patient is a 89 yo F with PMH of HTN and HLD who presents to the ED from [facility] with AMS and fever. As per son, pt was noted to be not like herself, as she did not recognize her daughter at the NH and also spiked a fever to 103F. At baseline, pt is AAOx2, but has had progressive functional and cognitive decline x a few months. She will forget names, forget year/president, and needs help dressing and using the restroom, but knows self and kids, knows location. Daughter has not noted cough, SOB, vomiting, or malodorous urine, however as per patient has had cough.     Hospital Course:  In the ED, pt was only minimally responsive and only gave name.  Was given dose of cefepime and 1 L bolus NS.  Na 123, BNP 3300, UA with small LE, WBC 15, no bacteria.  EKG showed sinus rhythm with premature SVCs  CXR showed pleural-based density in the left lower lobe.                                               ----------------------------------------------------------  OBJECTIVE  PAST MEDICAL & SURGICAL HISTORY  HTN (hypertension)    Femur neck fracture  right    History of hip surgery  Right 2022                                              -----------------------------------------------------------  ALLERGIES:  No Known Allergies                                            ------------------------------------------------------------    HOME MEDICATIONS  Home Medications:  Metoprolol Succinate ER 25 mg oral tablet, extended release: 1 tab(s) orally once a day (13 Mar 2022 12:56)  valsartan-hydrochlorothiazide 160 mg-25 mg oral tablet: 1 tab(s) orally once a day (13 Mar 2022 12:56)                           MEDICATIONS:  STANDING MEDICATIONS  cefTRIAXone   IVPB 1000 milliGRAM(s) IV Intermittent every 24 hours  heparin   Injectable 5000 Unit(s) SubCutaneous every 12 hours  lactated ringers. 1000 milliLiter(s) IV Continuous <Continuous>  lactated ringers. 1000 milliLiter(s) IV Continuous <Continuous>  remdesivir  IVPB   IV Intermittent   remdesivir  IVPB 100 milliGRAM(s) IV Intermittent every 24 hours    PRN MEDICATIONS  acetaminophen     Tablet .. 650 milliGRAM(s) Oral every 6 hours PRN                                            ------------------------------------------------------------  VITAL SIGNS: Last 24 Hours  T(C): 36 (15 Aug 2022 05:31), Max: 36.7 (14 Aug 2022 21:09)  T(F): 96.8 (15 Aug 2022 05:31), Max: 98 (14 Aug 2022 21:09)  HR: 66 (15 Aug 2022 05:31) (66 - 78)  BP: 172/74 (15 Aug 2022 05:31) (122/57 - 172/74)  BP(mean): --  RR: 19 (15 Aug 2022 05:31) (16 - 19)  SpO2: 98% (15 Aug 2022 05:31) (98% - 100%)                                             --------------------------------------------------------------  PHYSICAL EXAM:  CONSTITUTIONAL: No acute distress, well-developed, AAOx3  HEAD: Atraumatic, normocephalic  EYES: EOM intact, PERRLA, conjunctiva and sclera clear  ENT: Supple, no masses, no thyromegaly, no bruits, no JVD; moist mucous membranes  PULMONARY: Clear to auscultation bilaterally; no wheezes, rales, or rhonchi  CARDIOVASCULAR: Regular rate; no murmurs, rubs, or gallops  GASTROINTESTINAL: Soft, non-tender, non-distended; bowel sounds present  MUSCULOSKELETAL: 2+ peripheral pulses; no clubbing, no cyanosis, no edema  NEUROLOGY: follows commands, moves all extremities  SKIN: No rashes or lesions; warm and dry      LABS:                        11.2   3.14  )-----------( 218      ( 14 Aug 2022 06:19 )             33.9     08-14    142  |  101  |  21<H>  ----------------------------<  82  4.7   |  28  |  1.1    Ca    8.9      14 Aug 2022 06:19  Mg     1.7     08-14    TPro  5.8<L>  /  Alb  3.4<L>  /  TBili  <0.2  /  DBili  x   /  AST  21  /  ALT  8   /  AlkPhos  47  08-14                                                              -------------------------------------------------------------  RADIOLOGY:                                              --------------------------------------------------------------         LOREE CELAYA 89y Female  MRN#: [id number]   CODE STATUS: DNR    Hospital Day: 8d      INTERVAL HPI AND OVERNIGHT EVENTS:  Patient was examined and seen at bedside and is resting comfortably in bed. She was alert and in no acute distress. Patient is hard of hearing.     SUBJECTIVE  HPI  CC: "was not herself"   HPI: Patient is a 89 yo F with PMH of HTN and HLD who presents to the ED from [facility] with AMS and fever. As per son, pt was noted to be not like herself, as she did not recognize her daughter at the NH and also spiked a fever to 103F. At baseline, pt is AAOx2, but has had progressive functional and cognitive decline x a few months. She will forget names, forget year/president, and needs help dressing and using the restroom, but knows self and kids, knows location. Daughter has not noted cough, SOB, vomiting, or malodorous urine, however as per patient has had cough.     Hospital Course:  In the ED, pt was only minimally responsive and only gave name.  Was given dose of cefepime and 1 L bolus NS.  Na 123, BNP 3300, UA with small LE, WBC 15, no bacteria.  EKG showed sinus rhythm with premature SVCs  CXR showed pleural-based density in the left lower lobe.                                               ----------------------------------------------------------  OBJECTIVE  PAST MEDICAL & SURGICAL HISTORY  HTN (hypertension)    Femur neck fracture  right    History of hip surgery  Right 2022                                              -----------------------------------------------------------  ALLERGIES:  No Known Allergies                                            ------------------------------------------------------------    HOME MEDICATIONS  Home Medications:  Metoprolol Succinate ER 25 mg oral tablet, extended release: 1 tab(s) orally once a day (13 Mar 2022 12:56)  valsartan-hydrochlorothiazide 160 mg-25 mg oral tablet: 1 tab(s) orally once a day (13 Mar 2022 12:56)                           MEDICATIONS:  STANDING MEDICATIONS  cefTRIAXone   IVPB 1000 milliGRAM(s) IV Intermittent every 24 hours  heparin   Injectable 5000 Unit(s) SubCutaneous every 12 hours  lactated ringers. 1000 milliLiter(s) IV Continuous <Continuous>  lactated ringers. 1000 milliLiter(s) IV Continuous <Continuous>  remdesivir  IVPB   IV Intermittent   remdesivir  IVPB 100 milliGRAM(s) IV Intermittent every 24 hours    PRN MEDICATIONS  acetaminophen     Tablet .. 650 milliGRAM(s) Oral every 6 hours PRN                                            ------------------------------------------------------------  VITAL SIGNS: Last 24 Hours  T(C): 36 (15 Aug 2022 05:31), Max: 36.7 (14 Aug 2022 21:09)  T(F): 96.8 (15 Aug 2022 05:31), Max: 98 (14 Aug 2022 21:09)  HR: 66 (15 Aug 2022 05:31) (66 - 78)  BP: 172/74 (15 Aug 2022 05:31) (122/57 - 172/74)  BP(mean): --  RR: 19 (15 Aug 2022 05:31) (16 - 19)  SpO2: 98% (15 Aug 2022 05:31) (98% - 100%)                                             --------------------------------------------------------------  PHYSICAL EXAM:  CONSTITUTIONAL: No acute distress, frail, AAOx1  HEENT: Atraumatic, normocephalic, hard of hearing, no masses, no thyromegaly, no bruits, no JVD; moist mucous membranes  PULMONARY: Clear to auscultation bilaterally; no wheezes, rales, or rhonchi  CARDIOVASCULAR: Regular rate; no murmurs, rubs, or gallops  GASTROINTESTINAL: Soft, non-tender, non-distended; bowel sounds present  : + slight suprapubic tenderness  MUSCULOSKELETAL: 2+ peripheral pulses; no clubbing, no cyanosis, no edema  NEUROLOGY: AAO x1, no agitation (baseline mentation is forgetful)  SKIN: warm and dry        LABS:                        11.2   3.14  )-----------( 218      ( 14 Aug 2022 06:19 )             33.9     08-14    142  |  101  |  21<H>  ----------------------------<  82  4.7   |  28  |  1.1    Ca    8.9      14 Aug 2022 06:19  Mg     1.7     08-14    TPro  5.8<L>  /  Alb  3.4<L>  /  TBili  <0.2  /  DBili  x   /  AST  21  /  ALT  8   /  AlkPhos  47  08-14                                                              -------------------------------------------------------------  RADIOLOGY:    < from: Xray Chest 1 View- PORTABLE-Urgent (Xray Chest 1 View- PORTABLE-Urgent .) (08.07.22 @ 18:53) >  Impression:    Pleural-based density within the left lower lung. Prominence interstitial   markings. Correlate with CAT scan of the thorax.      < from: CT Head No Cont (08.08.22 @ 03:57) >  IMPRESSION:      No evidence of acute intracranial pathology.    Chronic microvascular ischemic changes.      < from: CT Chest No Cont (08.08.22 @ 17:42) >  IMPRESSION:  No focal consolidation to suggest pneumonia        < from: Xray Chest 1 View AP/PA (08.11.22 @ 12:02) >  Impression:    Stable reticular opacities. No pneumothorax      < end of copied text >                                              --------------------------------------------------------------

## 2022-08-15 NOTE — PROGRESS NOTE ADULT - ASSESSMENT
ASSESSMENT  89y F admitted with metabolic encephalopathy complicated UTI. Upon discharge back to her nursing home, she was found to be COVID-19 positive (08/10), now moved to .     PROBLEMS  #metabolic encephalopathy   - E.coli is pansensitive, pt is currently on: cefpodoxime (vantin) 200 mg q12hr for 5 days (started 08/10)  - WBC trending down 2.8 (08/12)    # Mild COVID-19   - Pt tested negative on 08/07, positive on 08/10  - On RA  - WBC is low- 2.8 (08/12)  - CXR is negative (08/11)- no ground glass opacities seen, stable opacities.      RECOMMENDATIONS  - Rocephin 1g q24hr. Could hold for now  - Abx for COVID- Remdesivir 200 mg (day 1), 100 mg (day 2 and 3) for 3 days total  Please do not hesitate to recall ID if any questions arise either through [employer] or through [employer]

## 2022-08-16 LAB
ALBUMIN SERPL ELPH-MCNC: 3.2 G/DL — LOW (ref 3.5–5.2)
ALP SERPL-CCNC: 52 U/L — SIGNIFICANT CHANGE UP (ref 30–115)
ALT FLD-CCNC: 8 U/L — SIGNIFICANT CHANGE UP (ref 0–41)
ANION GAP SERPL CALC-SCNC: 10 MMOL/L — SIGNIFICANT CHANGE UP (ref 7–14)
AST SERPL-CCNC: 16 U/L — SIGNIFICANT CHANGE UP (ref 0–41)
BASOPHILS # BLD AUTO: 0.01 K/UL — SIGNIFICANT CHANGE UP (ref 0–0.2)
BASOPHILS NFR BLD AUTO: 0.3 % — SIGNIFICANT CHANGE UP (ref 0–1)
BILIRUB SERPL-MCNC: 0.3 MG/DL — SIGNIFICANT CHANGE UP (ref 0.2–1.2)
BUN SERPL-MCNC: 14 MG/DL — SIGNIFICANT CHANGE UP (ref 10–20)
CALCIUM SERPL-MCNC: 8.7 MG/DL — SIGNIFICANT CHANGE UP (ref 8.5–10.1)
CHLORIDE SERPL-SCNC: 97 MMOL/L — LOW (ref 98–110)
CO2 SERPL-SCNC: 28 MMOL/L — SIGNIFICANT CHANGE UP (ref 17–32)
CREAT SERPL-MCNC: 0.9 MG/DL — SIGNIFICANT CHANGE UP (ref 0.7–1.5)
EGFR: 59 ML/MIN/1.73M2 — LOW
EOSINOPHIL # BLD AUTO: 0.09 K/UL — SIGNIFICANT CHANGE UP (ref 0–0.7)
EOSINOPHIL NFR BLD AUTO: 2.4 % — SIGNIFICANT CHANGE UP (ref 0–8)
GLUCOSE SERPL-MCNC: 85 MG/DL — SIGNIFICANT CHANGE UP (ref 70–99)
HCT VFR BLD CALC: 33.1 % — LOW (ref 37–47)
HGB BLD-MCNC: 11.2 G/DL — LOW (ref 12–16)
IMM GRANULOCYTES NFR BLD AUTO: 0.5 % — HIGH (ref 0.1–0.3)
LYMPHOCYTES # BLD AUTO: 1.33 K/UL — SIGNIFICANT CHANGE UP (ref 1.2–3.4)
LYMPHOCYTES # BLD AUTO: 35.5 % — SIGNIFICANT CHANGE UP (ref 20.5–51.1)
MAGNESIUM SERPL-MCNC: 2 MG/DL — SIGNIFICANT CHANGE UP (ref 1.8–2.4)
MCHC RBC-ENTMCNC: 28.6 PG — SIGNIFICANT CHANGE UP (ref 27–31)
MCHC RBC-ENTMCNC: 33.8 G/DL — SIGNIFICANT CHANGE UP (ref 32–37)
MCV RBC AUTO: 84.7 FL — SIGNIFICANT CHANGE UP (ref 81–99)
MONOCYTES # BLD AUTO: 0.48 K/UL — SIGNIFICANT CHANGE UP (ref 0.1–0.6)
MONOCYTES NFR BLD AUTO: 12.8 % — HIGH (ref 1.7–9.3)
NEUTROPHILS # BLD AUTO: 1.82 K/UL — SIGNIFICANT CHANGE UP (ref 1.4–6.5)
NEUTROPHILS NFR BLD AUTO: 48.5 % — SIGNIFICANT CHANGE UP (ref 42.2–75.2)
NRBC # BLD: 0 /100 WBCS — SIGNIFICANT CHANGE UP (ref 0–0)
PLATELET # BLD AUTO: 229 K/UL — SIGNIFICANT CHANGE UP (ref 130–400)
POTASSIUM SERPL-MCNC: 4.2 MMOL/L — SIGNIFICANT CHANGE UP (ref 3.5–5)
POTASSIUM SERPL-SCNC: 4.2 MMOL/L — SIGNIFICANT CHANGE UP (ref 3.5–5)
PROT SERPL-MCNC: 5.6 G/DL — LOW (ref 6–8)
RBC # BLD: 3.91 M/UL — LOW (ref 4.2–5.4)
RBC # FLD: 12.8 % — SIGNIFICANT CHANGE UP (ref 11.5–14.5)
SODIUM SERPL-SCNC: 135 MMOL/L — SIGNIFICANT CHANGE UP (ref 135–146)
WBC # BLD: 3.75 K/UL — LOW (ref 4.8–10.8)
WBC # FLD AUTO: 3.75 K/UL — LOW (ref 4.8–10.8)

## 2022-08-16 PROCEDURE — 99232 SBSQ HOSP IP/OBS MODERATE 35: CPT

## 2022-08-16 RX ORDER — DRONABINOL 2.5 MG
2.5 CAPSULE ORAL
Refills: 0 | Status: DISCONTINUED | OUTPATIENT
Start: 2022-08-16 | End: 2022-08-17

## 2022-08-16 RX ORDER — VALSARTAN 80 MG/1
160 TABLET ORAL DAILY
Refills: 0 | Status: DISCONTINUED | OUTPATIENT
Start: 2022-08-16 | End: 2022-08-21

## 2022-08-16 RX ORDER — VALSARTAN 80 MG/1
160 TABLET ORAL DAILY
Refills: 0 | Status: DISCONTINUED | OUTPATIENT
Start: 2022-08-16 | End: 2022-08-16

## 2022-08-16 RX ADMIN — Medication 2.5 MILLIGRAM(S): at 16:26

## 2022-08-16 RX ADMIN — VALSARTAN 160 MILLIGRAM(S): 80 TABLET ORAL at 11:26

## 2022-08-16 RX ADMIN — HEPARIN SODIUM 5000 UNIT(S): 5000 INJECTION INTRAVENOUS; SUBCUTANEOUS at 19:22

## 2022-08-16 RX ADMIN — HEPARIN SODIUM 5000 UNIT(S): 5000 INJECTION INTRAVENOUS; SUBCUTANEOUS at 05:24

## 2022-08-16 NOTE — PROGRESS NOTE ADULT - SUBJECTIVE AND OBJECTIVE BOX
forgetful. staff observes poor oral intake. denies abdominal pain or n/v    vss, afebrile  deconditioned  hard of hearing  less dry

## 2022-08-16 NOTE — PROGRESS NOTE ADULT - ASSESSMENT
Patient seen and examined independently of resident. My addendum supersedes resident notation. Case discussed with housestaff, nursing and patient    89 yo F with hx of HTN, HLD, s/p recent R hip fx d/t fall s/p ORIF initially admitted with acute metabolic encephalopathy with fever to 103 and found to have abn UA and low sodium level and elevated creatinine.  Mentation and electrolytes improved and plan was to transfer back to [facility] when covid test returned and it was positive. noted to have adult failure to thrive/ with marked severe protein calorie malnutrition 2/2 poor caloric intake  Now remains hospitalized on covid isolation; cannot return back to NH as yet per  / SW.    - Metabolic encephalopathy; resolved.  Back at baseline mentation  - E.coli UTI. - completed tx c ID guidance   - Covid +.  Not hypoxic.  + Leukopenia. s/p  IV Remdesivir x 3 days; maintain covid precautions.  continue to trend o2 requirements  dehydration, encourage po intake, s/p gentle ivh  check for ketones in blood in am  personally discussed c dietician, + nutritional supplements  hyponatremic, likely 2/2 hypovolemia and lack of appropriate solute intake and home overdiuresis, s/p gentle ivh +, hold home diuretic  guarded prognosis      DVT prophylaxis, nutrition to f/up and fall precautions.    Provider Hand off  Pending- + appetite stimulant, c/w calorie count, c/s palliative, reorient when confused  family discusison- medical team updated family today regarding POI  disposition snf

## 2022-08-17 DIAGNOSIS — R63.8 OTHER SYMPTOMS AND SIGNS CONCERNING FOOD AND FLUID INTAKE: ICD-10-CM

## 2022-08-17 PROCEDURE — 99231 SBSQ HOSP IP/OBS SF/LOW 25: CPT

## 2022-08-17 PROCEDURE — 99232 SBSQ HOSP IP/OBS MODERATE 35: CPT

## 2022-08-17 RX ORDER — MIRTAZAPINE 45 MG/1
7.5 TABLET, ORALLY DISINTEGRATING ORAL DAILY
Refills: 0 | Status: DISCONTINUED | OUTPATIENT
Start: 2022-08-17 | End: 2022-08-21

## 2022-08-17 RX ORDER — DRONABINOL 2.5 MG
2.5 CAPSULE ORAL
Refills: 0 | Status: DISCONTINUED | OUTPATIENT
Start: 2022-08-17 | End: 2022-08-17

## 2022-08-17 RX ADMIN — Medication 2.5 MILLIGRAM(S): at 11:31

## 2022-08-17 RX ADMIN — HEPARIN SODIUM 5000 UNIT(S): 5000 INJECTION INTRAVENOUS; SUBCUTANEOUS at 17:20

## 2022-08-17 RX ADMIN — HEPARIN SODIUM 5000 UNIT(S): 5000 INJECTION INTRAVENOUS; SUBCUTANEOUS at 05:22

## 2022-08-17 RX ADMIN — VALSARTAN 160 MILLIGRAM(S): 80 TABLET ORAL at 05:22

## 2022-08-17 NOTE — DIETITIAN INITIAL EVALUATION ADULT - ORAL NUTRITION SUPPLEMENTS
Ensure Enlive 2x/day to optimize pro/kcal intake (350kcal, 20 g pro/serving)  Ensure Enlive increase to 3x/day to optimize pro/kcal intake (1050kcal, 60 g pro/day total if consuming all 3)

## 2022-08-17 NOTE — DIETITIAN INITIAL EVALUATION ADULT - ADD RECOMMEND
calorie count x3days  multivitamin with minerals/daily  if within goals of care, patient may need supplemental enteral nutrition in the mean time pending improvement of PO intake  **patient at HIGH nutrition risk will follow up within 2-4days** may want to consider megace vs mirtazipine as marinol may contribute to confusion   calorie count x3days  multivitamin with minerals/daily  if within goals of care, patient may need supplemental enteral nutrition in the mean time pending improvement of PO intake  **patient at HIGH nutrition risk will follow up within 2-4days** may want to consider megace vs mirtazipine as marinol may contribute to confusion   f/u calorie count x3days  multivitamin with minerals/daily  **likely patient will improve back to baseline when returning to NH. Will continue to follow + patient at HIGH nutrition risk will follow up within 2-4days**

## 2022-08-17 NOTE — DIETITIAN NUTRITION RISK NOTIFICATION - TREATMENT: THE FOLLOWING DIET HAS BEEN RECOMMENDED
Diet, Easy to Chew:   Supplement Feeding Modality:  Oral  Ensure Enlive Cans or Servings Per Day:  3       Frequency:  Daily (08-17-22 @ 15:15) [Pending Verification By Attending]  Diet, Easy to Chew:   Supplement Feeding Modality:  Oral  Ensure Enlive Cans or Servings Per Day:  2       Frequency:  Daily (08-15-22 @ 16:30) [Active]

## 2022-08-17 NOTE — PROGRESS NOTE ADULT - SUBJECTIVE AND OBJECTIVE BOX
HPI:  89 year old female with a history of HTN and HLD presents to the ED from [facility] with altered mental status and fever. On presentation, pt minimally responsive, only gives name, unable to collect history. Spoke with son Rohan over the phone. Pt was noted to be not herself, not recognizing her daughter at the NH, also spiked a fever at NH to 103 so brought to ED. As per son, at baseline pt ~AAO*2, has had progressive functional/cognitive decline over the last few months, sometimes forgets names but knows herself and kids, usually knows where she is, likely not know year/president, walks with walker, needs help dressing/bathroom. Son lives in [place], his sister lives on  and visits mother daily. As per ED note, Daughter has not noted cough, shortness of breath, vomiting, malodourous urine. (07 Aug 2022 23:31)    INTERVAL EVENTS:  8/17: patient comfortable, states no issues, endorses not having appetite    ADVANCE DIRECTIVES:    DNR  MOLST  [ ]  Living Will  [ ]   DECISION MAKER(s):  [ ] Health Care Proxy(s)  [x ] Surrogate(s)  [ ] Guardian           Name(s): Phone Number(s):  Rohan Richardson [phone]  Alma Eugene [phone]    BASELINE (I)ADL(s) (prior to admission):  [place]: [ ]Total  [x ] Moderate [ ]Dependent  Palliative Performance Status Version 2:         %    http://npcrc.org/files/news/palliative_performance_scale_ppsv2.pdf    Allergies    No Known Allergies    Intolerances    MEDICATIONS  (STANDING):  dronabinol 2.5 milliGRAM(s) Oral two times a day before meals  heparin   Injectable 5000 Unit(s) SubCutaneous every 12 hours  valsartan 160 milliGRAM(s) Oral daily    MEDICATIONS  (PRN):  acetaminophen     Tablet .. 650 milliGRAM(s) Oral every 6 hours PRN Temp greater or equal to 38.5C (101.3F)      PRESENT SYMPTOMS: [ ]Unable to obtain due to poor mentation   Source if other than patient:  [ ]Family   [ ]Team     Pain: [ ]yes [x]no  QOL impact -   Location -                    Aggravating factors -  Quality -  Radiation -  Timing-  Severity (0-10 scale):  Minimal acceptable level (0-10 scale):     CPOT:    https://www.[facility].org/getattachment/[email or web address]-[email or web address]-[email or web address]/Critical-Care-Pain-Observation-Tool-(CPOT)      PAIN AD Score:     http://geriatrictoolkit.[email or web address]/cog/painad.pdf (press ctrl +  left click to view)    Dyspnea:                           [ ]Mild [ ]Moderate [ ]Severe  Anxiety:                             [ ]Mild [ ]Moderate [ ]Severe  Fatigue:                             [ ]Mild [ ]Moderate [ ]Severe  Nausea:                             [ ]Mild [ ]Moderate [ ]Severe  Loss of appetite:              [ ]Mild [ ]Moderate [x ]Severe  Constipation:                    [ ]Mild [ ]Moderate [ ]Severe    Other Symptoms:  [x ]All other review of systems negative     Palliative Performance Status Version 2:         %    http://[facility].org/files/news/palliative_performance_scale_ppsv2.pdf    PHYSICAL EXAM:  Vital Signs Last 24 Hrs  T(C): 35.9 (17 Aug 2022 14:00), Max: 36.7 (16 Aug 2022 21:36)  T(F): 96.7 (17 Aug 2022 14:00), Max: 98 (16 Aug 2022 21:36)  HR: 111 (17 Aug 2022 14:00) (74 - 111)  BP: 148/73 (17 Aug 2022 14:00) (140/63 - 148/91)  BP(mean): --  RR: 20 (17 Aug 2022 14:00) (18 - 20)  SpO2: 90% (17 Aug 2022 14:00) (90% - 100%)    Parameters below as of 17 Aug 2022 14:00  Patient On (Oxygen Delivery Method): room air     I&O's Summary    GENERAL:  [ ]Alert  [ ]Oriented x   [ ]Lethargic  [ ]Cachexia  [ ]Unarousable  [ X]Verbal  [ ]Non-Verbal  Behavioral:   [ ] Anxiety  [ ] Delirium [ ] Agitation [X ] Calm [ ] Other  HEENT:  [X ]Normal   [ ]Dry mouth   [ ]ET Tube/Trach  [ ]Oral lesions  PULMONARY:   [ ]Clear [ ]Tachypnea  [ ]Audible excessive secretions [X ] No labored breathing  [ ]Rhonchi        [ ]Right [ ]Left [ ]Bilateral  [ ]Crackles        [ ]Right [ ]Left [ ]Bilateral  [ ]Wheezing     [ ]Right [ ]Left [ ]Bilateral  [ ]Diminished breath sounds [ ]right [ ]left [ ]bilateral  CARDIOVASCULAR:    [ ]Regular [ ]Irregular [ ]Tachy  [ ]Jordy [ ]Murmur [ ]Other  GASTROINTESTINAL:  [ ]Soft  [ ]Distended  [X ] Not distended [ ]+BS  [ ]Non tender [ ]Tender  [ ]PEG [ ]OGT/ NGT  Last BM:   GENITOURINARY:  [ ]Normal [ ] Incontinent   [ ]Oliguria/Anuria   [ X]Lester  MUSCULOSKELETAL:   [ ]Normal   [ ]Weakness  [ ]Bed/Wheelchair bound [ ]Edema  NEUROLOGIC:   [ ]No focal deficits  [ X]Cognitive impairment  [ ]Dysphagia [ ]Dysarthria [ ]Paresis [ ]Other   SKIN:   [ ]Normal   [X ] Nonjaundiced [ ]Rash  [ ]Pressure ulcer(s)       Present on admission [ ]y [ ]n    LABS:                                                   11.2   3.75  )-----------( 229      ( 16 Aug 2022 06:11 )             33.1     08-16    135  |  97<L>  |  14  ----------------------------<  85  4.2   |  28  |  0.9    Ca    8.7      16 Aug 2022 06:11  Mg     2.0     08-16          RADIOLOGY & ADDITIONAL STUDIES:  < from: 12 Lead ECG (08.07.22 @ 18:19) >  Ventricular Rate 79 BPM    Atrial Rate 79 BPM    P-R Interval 198 ms    QRS Duration 78 ms    Q-T Interval 384 ms    QTC Calculation(Bazett) 440 ms    P Axis 84 degrees    R Axis -7 degrees    T Axis 5 degrees    Diagnosis Line Sinus rhythm with Premature supraventricular complexes  Nonspecific ST abnormality  Abnormal ECG    Confirmed by Mukesh Douglass ([id number]) on 8/7/2022 10:33:20 PM    < end of copied text >        REFERRALS:   [ ]Chaplaincy  [ ]Hospice  [ ]Child Life  [ x]Social Work  [x ]Case management [ ]Holistic Therapy     Goals of Care Document:

## 2022-08-17 NOTE — PROGRESS NOTE ADULT - SUBJECTIVE AND OBJECTIVE BOX
SUBJECTIVE:    Patient is a 89y old Female who presents with a chief complaint of METABOLIC ENCEPHALOPATHY; FEVER; HYPONATREMIA     (17 Aug 2022 10:52)    Currently admitted to medicine with the primary diagnosis of Metabolic encephalopathy       Today is hospital day 10d. This morning she is resting comfortably in bed and reports no new issues or overnight events.     PAST MEDICAL & SURGICAL HISTORY  HTN (hypertension)    Femur neck fracture  right    History of hip surgery  Right 2022      SOCIAL HISTORY:  Negative for smoking/alcohol/drug use.     ALLERGIES:  No Known Allergies    MEDICATIONS:  STANDING MEDICATIONS  heparin   Injectable 5000 Unit(s) SubCutaneous every 12 hours  valsartan 160 milliGRAM(s) Oral daily    PRN MEDICATIONS  acetaminophen     Tablet .. 650 milliGRAM(s) Oral every 6 hours PRN  dronabinol 2.5 milliGRAM(s) Oral two times a day PRN    VITALS:   T(F): 96.7  HR: 111  BP: 148/73  RR: 20  SpO2: 90%    LABS:                        11.2   3.75  )-----------( 229      ( 16 Aug 2022 06:11 )             33.1     08-16    135  |  97<L>  |  14  ----------------------------<  85  4.2   |  28  |  0.9    Ca    8.7      16 Aug 2022 06:11  Mg     2.0     08-16    TPro  5.6<L>  /  Alb  3.2<L>  /  TBili  0.3  /  DBili  x   /  AST  16  /  ALT  8   /  AlkPhos  52  08-16                  RADIOLOGY:    PHYSICAL EXAM:  GEN: No acute distress  LUNGS: Clear to auscultation bilaterally   HEART: S1/S2 present. RRR.   ABD: Soft, non-tender, non-distended. Bowel sounds present  EXT: NC/NC/NE/2+PP/SANDERS/Skin Intact.   NEURO: AAOX3    Intravenous access:   NG tube:   Lester Catheter:

## 2022-08-17 NOTE — PROGRESS NOTE ADULT - ASSESSMENT
89yFemale being evaluated for goals of care and symptom management. Palliative reconsulted for nutritional GOC in setting of FTT.

## 2022-08-17 NOTE — DIETITIAN INITIAL EVALUATION ADULT - PERTINENT LABORATORY DATA
08-16    135  |  97<L>  |  14  ----------------------------<  85  4.2   |  28  |  0.9    Ca    8.7      16 Aug 2022 06:11  Mg     2.0     08-16    TPro  5.6<L>  /  Alb  3.2<L>  /  TBili  0.3  /  DBili  x   /  AST  16  /  ALT  8   /  AlkPhos  52  08-16

## 2022-08-17 NOTE — DIETITIAN INITIAL EVALUATION ADULT - NS FNS DIET ORDER
Diet, Easy to Chew:   Supplement Feeding Modality:  Oral  Ensure Enlive Cans or Servings Per Day:  2       Frequency:  Daily (08-15-22 @ 16:30) [Active]

## 2022-08-17 NOTE — DIETITIAN INITIAL EVALUATION ADULT - NSFNSNUTRCHEWSWALLOWFT_GEN_A_CORE
SLP recommending easy to chew diet with thin liquids  prior to admission was following regular diet with thin liquids

## 2022-08-17 NOTE — DIETITIAN INITIAL EVALUATION ADULT - ORAL INTAKE PTA/DIET HISTORY
unable to obtain subjective information from patient, confused at time of visit unable to obtain subjective information from patient, confused at time of visit. Spoke with patients son via phone who reports over the years has been having a fairly good appetite/PO intake at home where she resided-- consumed 3 meals/day and worked with a dietitian to increase calories to gain weight which was successful. Has been living at nursing home for ~5months and had good PO intake there as well. Would have Ensure supplements PRN if she didn't want to eat food as some days she did not want to eat meals. Enjoys food very marinated/flavorful. MV/daily  UBW a few years ago was 40.4kg and more recent UBW: 47.3kg in 3/2022 per NH records. Indicating weight gain of 7.3kg

## 2022-08-17 NOTE — PROGRESS NOTE ADULT - ASSESSMENT
89 yo F with hx of HTN, HLD, s/p recent R hip fx d/t fall s/p ORIF initially admitted with acute metabolic encephalopathy with fever to 103 and found to have abn UA and low sodium level and elevated creatinine.  Mentation and electrolytes improved and plan was to transfer back to [facility] when covid test returned and it was positive. noted to have adult failure to thrive/ with marked severe protein calorie malnutrition 2/2 poor caloric intake  Now remains hospitalized on covid isolation; cannot return back to NH as yet per  / SW.    - Metabolic encephalopathy; resolved.  Back at baseline mentation  - E.coli UTI. - completed tx c ID guidance   - Covid +.  Not hypoxic.  + Leukopenia. s/p  IV Remdesivir x 3 days; maintain covid precautions.  continue to trend o2 requirements  dehydration, encourage po intake, s/p gentle ivh  check for ketones in blood in am  personally discussed c dietician, + nutritional supplements  hyponatremic, likely 2/2 hypovolemia and lack of appropriate solute intake and home overdiuresis, s/p gentle ivh +, hold home diuretic  guarded prognosis      DVT prophylaxis, nutrition to f/up and fall precautions.

## 2022-08-17 NOTE — DIETITIAN INITIAL EVALUATION ADULT - OTHER INFO
medical:  89 yo F with hx of HTN, HLD, s/p recent R hip fx d/t fall s/p ORIF initially admitted with acute metabolic encephalopathy with fever to 103 and found to have abn UA and low sodium level and elevated creatinine.  Mentation and electrolytes improved and plan was to transfer back to [facility] when covid test returned and it was positive. noted to have adult failure to thrive/ with marked severe protein calorie malnutrition 2/2 poor caloric intake  Now remains hospitalized on covid isolation; cannot return back to NH as yet per  / SW. medical:  89 yo F with hx of HTN, HLD, s/p recent R hip fx d/t fall s/p ORIF initially admitted with acute metabolic encephalopathy with fever to 103 and found to have abn UA and low sodium level and elevated creatinine.  Mentation and electrolytes improved and plan was to transfer back to [facility] when covid test returned and it was positive. noted to have adult failure to thrive. Now remains hospitalized on covid isolation; cannot return back to NH as yet per  / SW.

## 2022-08-17 NOTE — DIETITIAN INITIAL EVALUATION ADULT - ENERGY INTAKE
[id number] (<50%) during admission patient with overall poor PO intake, consuming <50% of meal trays on average ~10days. Added ensure enlive 2 days ago to optimize intake  [id number]  though prior was adequate %/Poor (<50%) during admission patient with overall poor PO intake, consuming <50% of meal trays on average ~10days. Per RN patient turns head away and does not want to eat food -- suspect due to food preferences vs metabolic encephalopathy. Added Ensure Enlive two days ago to optimize intake patient enjoys ensure here and has been drinking them 2x/day since adding.

## 2022-08-17 NOTE — DIETITIAN INITIAL EVALUATION ADULT - PERTINENT MEDS FT
MEDICATIONS  (STANDING):  heparin   Injectable 5000 Unit(s) SubCutaneous every 12 hours  valsartan 160 milliGRAM(s) Oral daily    MEDICATIONS  (PRN):  acetaminophen     Tablet .. 650 milliGRAM(s) Oral every 6 hours PRN Temp greater or equal to 38.5C (101.3F)  dronabinol 2.5 milliGRAM(s) Oral two times a day PRN Appetite Stimulation

## 2022-08-17 NOTE — DIETITIAN INITIAL EVALUATION ADULT - NUTRITIONGOAL OUTCOME1
[facility] AMBULATORY ENCOUNTER  GI OFFICE VISIT PROGRESS  NOTE      PCP: Adolfo Doty MD    CHIEF COMPLAINT:  Follow-up (1 month)       SUBJECTIVE:    Dickson Bucio is a 61 year old male seen today for f/u with GERD.  Patient was seen by Dr. Palafox a few months ago for GERD and long term use PPI. Patient was interested in surgical intervention for his GERD.  He then underwent FL esophagram, gastric emptying test and ESMO for determining suitability for the surgery.  During ENDOFLIP test, the hernia was found to resolve spontaneously.  Patient is here today to discuss about the next step.  He states his acid reflux symptom is neither severe nor debilitating.     MEDICATIONS:    Current Outpatient Medications   Medication Sig Dispense Refill   • losartan (COZAAR) 100 MG tablet Take 1 tablet by mouth daily. 90 tablet 0   • fluticasone (FLONASE) 50 MCG/ACT nasal spray Spray 1 spray in each nostril 2 times daily. (Patient taking differently: Spray 1 spray in each nostril 2 times daily as needed. ) 16 g 11   • hydrochlorothiazide (MICROZIDE) 12.5 MG capsule Take 1 capsule by mouth daily. 30 capsule 3   • ibuprofen (MOTRIN) 600 MG tablet Take 1 tablet by mouth 2 times daily as needed for Pain. 60 tablet 1   • amLODIPine (NORVASC) 10 MG tablet TAKE 1 TABLET BY MOUTH ONCE DAILY 90 tablet 1   • Peppermint Oil 50 MG CAPSULE DELAYED RELEASE Take by mouth daily.      • pantoprazole (PROTONIX) 40 MG tablet Take 1 tablet by mouth daily. 30 tablet 0   • polyethylene glycol (MIRALAX) powder Take 17 g by mouth daily. (Patient taking differently: Take 17 g by mouth daily as needed. ) 510 g 11   • DISPENSE Devices Continuous airway pressure (CPAP) device equipement, Humidifier, heated, Tubing (1 per 3 months), Filter, disposable (2 per 1 month) and Filter, non-disposable (1 per 6 months) 1 each 0   • HYDROcodone-acetaminophen (NORCO) 5-325 MG per tablet Take 1 tablet by mouth 3 times daily as needed for Pain. 6 tablet 0     Current  Facility-Administered Medications   Medication Dose Route Frequency Provider Last Rate Last Dose   • ondansetron (ZOFRAN) injection 4 mg  4 mg Intravenous Once Patel Palafox MD           PROBLEM LIST:    Patient Active Problem List   Diagnosis   • SBO (small bowel obstruction) (CMS/HCC)   • Benign essential HTN   • Hypokalemia   • Bilateral carpal tunnel syndrome   • Arthritis of both knees   • Other synovitis and tenosynovitis, right forearm   • Benign paroxysmal positional vertigo of right ear   • Asymmetric SNHL (sensorineural hearing loss)   • Sleep apnea   • Sleep apnea   • Essential (primary) hypertension       HISTORIES:    ALLERGIES:  No Known Allergies  Past Medical History:   Diagnosis Date   • Arthritis    • Chronic pain     knee and hands   • Colon polyps    • Essential (primary) hypertension    • Gastroesophageal reflux disease    • Hiatal hernia    • High cholesterol    • EFREN (obstructive sleep apnea)    • Sleep apnea     CPAP   • Small bowel obstruction (CMS/HCC) 01/2017     Past Surgical History:   Procedure Laterality Date   • Colonoscopy     • Egd  12/19/2019   • Egd  12/19/2019   • Esophagogastroduodenoscopy  06/19/2019   • Removal gallbladder       Social History     Tobacco Use   • Smoking status: Former Smoker     Packs/day: 0.00   • Smokeless tobacco: Never Used   Substance Use Topics   • Alcohol use: No     Frequency: Never     Drinks per session: 1 or 2     Binge frequency: Never   • Drug use: No     Family History   Problem Relation Age of Onset   • Cancer Father         lung cancer   • Hypertension Father    • Allergic Rhinitis Mother    • Asthma Mother    • Allergic Rhinitis Sister    • Allergic Rhinitis Brother    • Angioedema Neg Hx    • Eczema Neg Hx    • Urticaria Neg Hx    • Immunodeficiency Neg Hx          REVIEW OF SYSTEMS:    All other systems are reviewed and are negative except GERD as documented in the history of present illness.    PHYSICAL EXAM:    Vital Signs: Blood  pressure 118/68, pulse 74, resp. rate 12, height 5' 11\" (1.803 m), weight 113.2 kg.  General: The patient is well developed, well nourished, in no acute distress, appears stated age.   Psychiatric: Alert. Normal mood and affect, normal speech, no gross tremor.  Skin: Warm and dry, normal turgor.  Head: Normocephalic.  Eyes: Normal conjunctivae and sclerae.  Pupils equal, round.  Extraocular movements intact.  Respiratory: Respiratory effort normal.   Clear to auscultation bilaterally.  Cardiovascular: Regular rate and rhythm.   Extremities: No swelling or tenderness.  No edema.  Gastrointestinal:  Soft and nontender.  Normal bowel sounds.  No hepatomegaly or splenomegaly.       LABORATORY DATA:    Lab Results   Component Value Date    WBC 8.3 06/19/2019    HGB 16.6 06/19/2019    HCT 47.8 06/19/2019     06/19/2019    MCV 91.9 06/19/2019    and   Lab Results   Component Value Date    SODIUM 142 01/03/2020    POTASSIUM 4.4 01/03/2020    BUN 8 01/03/2020    CREATININE 0.88 01/03/2020    CALCIUM 9.3 01/03/2020    ALBUMIN 4.1 01/03/2020    BILIRUBIN 1.1 (H) 01/03/2020    ALKPT 103 01/03/2020    AST 19 01/03/2020    GPT 17 01/03/2020       ASSESSMENT/ PLAN  K21.9 Gastroesophageal reflux disease, esophagitis presence not specified  (primary encounter diagnosis)  61 year old male here today to discuss about the next step for his surgical intervention for his acid reflux.  Discussed with patient that during committee meeting next week between Dr. Palafox and Dr. Weinberg, they will discuss about his case considering various contributing factors to determine if he is a candidate for the procedure. After the committee meeting, he will receive a call from our staff about the next step.  Patient understood and agreed.    Instructions provided as documented in the after visit summary.  The patient indicated understanding of the diagnosis and agreed with the plan of care.     COPY TO:  MD Adolfo Law  MD Lalitha    SIGNED:  ELLA Regan  1/20/2020  7:05 PM         meet >/=75% est energy needs within 4days

## 2022-08-18 LAB
ALBUMIN SERPL ELPH-MCNC: 3.2 G/DL — LOW (ref 3.5–5.2)
ALP SERPL-CCNC: 52 U/L — SIGNIFICANT CHANGE UP (ref 30–115)
ALT FLD-CCNC: 7 U/L — SIGNIFICANT CHANGE UP (ref 0–41)
ANION GAP SERPL CALC-SCNC: 9 MMOL/L — SIGNIFICANT CHANGE UP (ref 7–14)
AST SERPL-CCNC: 14 U/L — SIGNIFICANT CHANGE UP (ref 0–41)
BILIRUB SERPL-MCNC: 0.5 MG/DL — SIGNIFICANT CHANGE UP (ref 0.2–1.2)
BUN SERPL-MCNC: 17 MG/DL — SIGNIFICANT CHANGE UP (ref 10–20)
CALCIUM SERPL-MCNC: 8.9 MG/DL — SIGNIFICANT CHANGE UP (ref 8.5–10.1)
CHLORIDE SERPL-SCNC: 96 MMOL/L — LOW (ref 98–110)
CO2 SERPL-SCNC: 29 MMOL/L — SIGNIFICANT CHANGE UP (ref 17–32)
CREAT SERPL-MCNC: 0.9 MG/DL — SIGNIFICANT CHANGE UP (ref 0.7–1.5)
EGFR: 59 ML/MIN/1.73M2 — LOW
GLUCOSE SERPL-MCNC: 99 MG/DL — SIGNIFICANT CHANGE UP (ref 70–99)
HCT VFR BLD CALC: 35.1 % — LOW (ref 37–47)
HGB BLD-MCNC: 12 G/DL — SIGNIFICANT CHANGE UP (ref 12–16)
MAGNESIUM SERPL-MCNC: 1.7 MG/DL — LOW (ref 1.8–2.4)
MCHC RBC-ENTMCNC: 29.1 PG — SIGNIFICANT CHANGE UP (ref 27–31)
MCHC RBC-ENTMCNC: 34.2 G/DL — SIGNIFICANT CHANGE UP (ref 32–37)
MCV RBC AUTO: 85 FL — SIGNIFICANT CHANGE UP (ref 81–99)
NRBC # BLD: 0 /100 WBCS — SIGNIFICANT CHANGE UP (ref 0–0)
PLATELET # BLD AUTO: 259 K/UL — SIGNIFICANT CHANGE UP (ref 130–400)
POTASSIUM SERPL-MCNC: 4.1 MMOL/L — SIGNIFICANT CHANGE UP (ref 3.5–5)
POTASSIUM SERPL-SCNC: 4.1 MMOL/L — SIGNIFICANT CHANGE UP (ref 3.5–5)
PROT SERPL-MCNC: 5.7 G/DL — LOW (ref 6–8)
RBC # BLD: 4.13 M/UL — LOW (ref 4.2–5.4)
RBC # FLD: 13.1 % — SIGNIFICANT CHANGE UP (ref 11.5–14.5)
SODIUM SERPL-SCNC: 134 MMOL/L — LOW (ref 135–146)
WBC # BLD: 4.74 K/UL — LOW (ref 4.8–10.8)
WBC # FLD AUTO: 4.74 K/UL — LOW (ref 4.8–10.8)

## 2022-08-18 PROCEDURE — 99232 SBSQ HOSP IP/OBS MODERATE 35: CPT

## 2022-08-18 PROCEDURE — 99233 SBSQ HOSP IP/OBS HIGH 50: CPT

## 2022-08-18 RX ORDER — MAGNESIUM SULFATE 500 MG/ML
2 VIAL (ML) INJECTION ONCE
Refills: 0 | Status: COMPLETED | OUTPATIENT
Start: 2022-08-18 | End: 2022-08-18

## 2022-08-18 RX ADMIN — Medication 25 GRAM(S): at 18:51

## 2022-08-18 RX ADMIN — HEPARIN SODIUM 5000 UNIT(S): 5000 INJECTION INTRAVENOUS; SUBCUTANEOUS at 05:18

## 2022-08-18 RX ADMIN — MIRTAZAPINE 7.5 MILLIGRAM(S): 45 TABLET, ORALLY DISINTEGRATING ORAL at 11:23

## 2022-08-18 RX ADMIN — HEPARIN SODIUM 5000 UNIT(S): 5000 INJECTION INTRAVENOUS; SUBCUTANEOUS at 18:50

## 2022-08-18 RX ADMIN — VALSARTAN 160 MILLIGRAM(S): 80 TABLET ORAL at 05:18

## 2022-08-18 NOTE — PROGRESS NOTE ADULT - ASSESSMENT
89 yo F with hx of HTN, HLD, s/p recent R hip fx d/t fall s/p ORIF initially admitted with acute metabolic encephalopathy with fever to 103 and found to have abn UA and low sodium level and elevated creatinine.  Mentation and electrolytes improved and plan was to transfer back to [facility] when covid test returned and it was positive. noted to have adult failure to thrive/ with marked severe protein calorie malnutrition 2/2 poor caloric intake    - Metabolic encephalopathy; resolving  Back at baseline mentation  - E.coli UTI. - completed tx c ID guidance   - Covid +.  Not hypoxic.  + Leukopenia. s/p  IV Remdesivir x 3 days; maintain covid precautions.  continue to trend o2 requirements  dehydration, encourage po intake, s/p gentle ivh  hyponatremic, likely 2/2 hypovolemia and lack of appropriate solute intake and home overdiuresis, s/p gentle ivh +, hold home diuretic  guarded prognosis      DVT prophylaxis, nutrition to f/up and fall precautions.    Pending : Calories count.   DC planning after GOC discussion with family re: nutrition / ? PEG  Dispo :  LTC

## 2022-08-18 NOTE — PROGRESS NOTE ADULT - ASSESSMENT
89 yo F with hx of HTN, HLD, s/p recent R hip fx d/t fall s/p ORIF initially admitted with acute metabolic encephalopathy with fever to 103 and found to have abn UA and low sodium level and elevated creatinine.  Mentation and electrolytes improved and plan was to transfer back to [facility] when covid test returned and it was positive. noted to have adult failure to thrive/ with marked severe protein calorie malnutrition 2/2 poor caloric intake    - Metabolic encephalopathy; resolved.  Back at baseline mentation  - E.coli UTI. - completed tx c ID guidance   - Covid +.  Not hypoxic.  + Leukopenia. s/p  IV Remdesivir x 3 days; maintain covid precautions.  continue to trend o2 requirements  dehydration, encourage po intake, s/p gentle ivh  check for ketones in blood in am  personally discussed c dietician, + nutritional supplements  hyponatremic, likely 2/2 hypovolemia and lack of appropriate solute intake and home overdiuresis, s/p gentle ivh +, hold home diuretic  guarded prognosis      DVT prophylaxis, nutrition to f/up and fall precautions.

## 2022-08-18 NOTE — PROGRESS NOTE ADULT - SUBJECTIVE AND OBJECTIVE BOX
SUBJECTIVE:    Patient is a 89y old Female who presents with a chief complaint of Fever (18 Aug 2022 14:15)    Currently admitted to medicine with the primary diagnosis of Metabolic encephalopathy       Today is hospital day 11d. This morning she is resting comfortably in bed and reports no new issues or overnight events.     PAST MEDICAL & SURGICAL HISTORY  HTN (hypertension)    Femur neck fracture  right    History of hip surgery  Right 2022      SOCIAL HISTORY:  Negative for smoking/alcohol/drug use.     ALLERGIES:  No Known Allergies    MEDICATIONS:  STANDING MEDICATIONS  heparin   Injectable 5000 Unit(s) SubCutaneous every 12 hours  magnesium sulfate  IVPB 2 Gram(s) IV Intermittent once  mirtazapine 7.5 milliGRAM(s) Oral daily  valsartan 160 milliGRAM(s) Oral daily    PRN MEDICATIONS  acetaminophen     Tablet .. 650 milliGRAM(s) Oral every 6 hours PRN    VITALS:   T(F): 97.1  HR: 97  BP: 109/72  RR: 20  SpO2: 77%    LABS:                        12.0   4.74  )-----------( 259      ( 18 Aug 2022 06:47 )             35.1     08-18    134<L>  |  96<L>  |  17  ----------------------------<  99  4.1   |  29  |  0.9    Ca    8.9      18 Aug 2022 06:47  Mg     1.7     08-18    TPro  5.7<L>  /  Alb  3.2<L>  /  TBili  0.5  /  DBili  x   /  AST  14  /  ALT  7   /  AlkPhos  52  08-18                  RADIOLOGY:    PHYSICAL EXAM:  GEN: No acute distress  LUNGS: Clear to auscultation bilaterally   HEART: S1/S2 present. RRR.   ABD: Soft, non-tender, non-distended. Bowel sounds present  EXT: NC/NC/NE/2+PP/SANDERS/Skin Intact.   NEURO: AAOX3    Intravenous access:   NG tube:   Lester Catheter:

## 2022-08-18 NOTE — CHART NOTE - NSCHARTNOTEFT_GEN_A_CORE
PALLIATIVE MEDICINE INTERDISCIPLINARY TEAM NOTE    Provider:  [ x  ]Social Work   [   ]          [ x  ] Initial visit [   ] Follow up    Family or contact name / phone #   Met with: [  x ] Patient:  patient was observed to be resting comfortably  [   ] Family  [   ] Other:    Primary Language: [   ] English [   ] Other*:                      *Interpretation provided by:    SUPPORT DIAGNOSES            (Check all that apply)  [   ] Psychosocial spiritual assessment (PSSA)  [   ] EOL issues  [   ] Cultural / spiritual concerns  [   ] Pain / suffering  [  x ] Dementia / AMS  [   ] Other:  [   ] AD issues  [  x ] Grief / loss / sadness  [   ] Discharge issues  [x   ] Distress / coping    PSYCHOSOCIAL ASSESSMENT OF PATIENT         (Check all that apply)  [x   ] Initial Assessment            [   ] Reassessment          [   ] Not Applicable this visit    Pain/suffering acuity:  patient appeared to be comfortable  [   ] None to mild (0-3)           [   ] Moderate (4-6)        [   ] High (7-10)    Mental Status:  [   ] Alert/oriented (x3)          [   ] Confused/Altered(x2/x1)         [ x  ] Non-resp    Functional status:  [   ] Independent w ADLs      [   ] Needs Assistance             [  x ] Bedbound/Full Care    Coping:  unable to assess  [   ] Coping well                     [   ] Coping w/difficulty            [   ] Poor coping    Support system:  [  x ] Strong                              [   ] Adequate                        [   ] Inadequate      Past history and medications for:   unknown    [ ] Anxiety       [ ] Depression    [ ] Sleep disorders     SPIRITUAL ASSESSMENT  Presybeterian/Spiritual practice: ___________________________    Role of organized Taoist:  [   ] Important                     [   ] Some (fam tradition, cultural)               [   ] None    Effects on medical care:  [   ] Yes, _____________________________________                         [   ] None    Cultural/Episcopalian need:  [   ] Yes, _____________________________________                         [   ] None    Refer to Pastoral Care:  [   ] Yes           [   ] No, not at this time    SERVICE PROVIDED  [   ]PSSA                                                                             [   ]Discharge support / facilitation  [ x  ]AD / goals of care counseling                                  [   ]EOL / death / bereavement counseling  [ x  ]Counseling / support                                                [   ] Family meeting  [   ]Prayer / sacrament / ritual                                      [   ] Referral   [   ]Other                                                                       NOTE and Plan of Care (PoC):    Patient is a 89 year old female.  Patient was admitted from [facility] on 8/7/22, dx:  metabolic encephalopathy, fever, hyponatremia, AMS.  Patient has PMH of HTN and HLD.  Patient presented to ED for AMS and fever.    Patient was observed to be resting comfortably.  T/C to daughter, Alma Knight:  reviewed role of Palliative Care.  Support rendered.

## 2022-08-18 NOTE — CHART NOTE - NSCHARTNOTEFT_GEN_A_CORE
Calorie Count Results:  based on 2 days as day one was improperly filled out     Day 1:  n/a  Day 2: 437kcal, 25g pro  Day 3: 700kcal, 40g protein    Average intake per day:  569kcal/day meeting 59% of nutrition needs  33g protein/day meeting 57% of protein needs    Patient was eating well prior to admission, likely not doing well with PO here due to COVID + food preferences in hospital. Suspect patient will improve back to baseline when returning to NH. Patient needs a lot of encouragement/motivation when eating + if consuming 3 ensures/day can meet nutrition needs even without solid food (though would still encourage pt to consume solid food). Will continue to follow    Diet, Easy to Chew:   Supplement Feeding Modality:  Oral  Ensure Enlive Cans or Servings Per Day:  3       Frequency:  Daily (08-17-22 @ 15:15) [Active]    Nutrition Needs:  using ABW 48kg  ENERGY: 970-1051kcal/day (MSJ x1.2-1.3)  PROTEIN: 58-62g/day (1.2-1.3g/kg)  FLUIDS: 1200mL/day (25mL/kg)    RECOMMENDATIONS:  -Continue: Easy to chew diet  -Ensure Enlive increased to 3x/day which is patient consumes all 3 she can meet nutrition needs (1050kcal, 60 g pro/day total if consuming all 3). Would encourage patient to drink 3 ensure a day and as long as she's able to do this daily she would not need alternate means of nutrition/hydration.  -1:1 feed requires a lot of motivation/encouragement  -agree with switching to mirtazipine as marinol may contributed to confusion   -multivitamin with minerals/daily    Santos Galvan, RD  #[id number] or via TEAMS

## 2022-08-18 NOTE — PROGRESS NOTE ADULT - SUBJECTIVE AND OBJECTIVE BOX
LOREE CELAAY  89y  Female      Patient is a 89y old  Female who presents with a chief complaint of Fever.      INTERVAL HPI/OVERNIGHT EVENTS:      ******************************* REVIEW OF SYSTEMS:**********************************************    All other review of systems negative    *********************** VITALS ******************************************    T(F): 97.1 (08-18-22 @ 13:00)  HR: 97 (08-18-22 @ 13:00) (91 - 97)  BP: 109/72 (08-18-22 @ 13:00) (109/72 - 123/66)  RR: 20 (08-18-22 @ 13:00) (18 - 20)  SpO2: 77% (08-18-22 @ 13:00) (77% - 100%)            ******************************** PHYSICAL EXAM:**************************************************  GENERAL: NAD    PSYCH: no agitation, baseline mentation  HEENT:     NERVOUS SYSTEM:  Alert & Oriented X 2-3    PULMONARY: RAYMUNDO, CTA    CARDIOVASCULAR: S1S2 RRR    GI: Soft, NT, ND; BS present.    EXTREMITIES:  2+ Peripheral Pulses, No clubbing, cyanosis, or edema    LYMPH: No lymphadenopathy noted    SKIN: No rashes or lesions      **************************** LABS *******************************************************                          12.0   4.74  )-----------( 259      ( 18 Aug 2022 06:47 )             35.1     08-18    134<L>  |  96<L>  |  17  ----------------------------<  99  4.1   |  29  |  0.9    Ca    8.9      18 Aug 2022 06:47  Mg     1.7     08-18    TPro  5.7<L>  /  Alb  3.2<L>  /  TBili  0.5  /  DBili  x   /  AST  14  /  ALT  7   /  AlkPhos  52  08-18          Lactate Trend        CAPILLARY BLOOD GLUCOSE              **************************Active Medications *******************************************  No Known Allergies      acetaminophen     Tablet .. 650 milliGRAM(s) Oral every 6 hours PRN  heparin   Injectable 5000 Unit(s) SubCutaneous every 12 hours  magnesium sulfate  IVPB 2 Gram(s) IV Intermittent once  mirtazapine 7.5 milliGRAM(s) Oral daily  valsartan 160 milliGRAM(s) Oral daily      ***************************************************  RADIOLOGY & ADDITIONAL TESTS:    Imaging Personally Reviewed:  [ ] YES  [ ] NO    HEALTH ISSUES - PROBLEM Dx:  Palliative care by specialist    Urinary tract infection with fever  Fever    A fever is an increase in the body&#x27;s temperature above 100.4°F (38°C) or higher. In adults and children older than three months, a brief mild or moderate fever generally has no long-term effect, and it usually does not require treatment. Many times, fevers are the result of viral infections, which are self-resolving.  However, certain symptoms or diagnostic tests may suggest a bacterial infection that may respond to antibiotics. Take medications as directed by your health care provider.    SEEK IMMEDIATE MEDICAL CARE IF YOU OR YOUR CHILD HAVE ANY OF THE FOLLOWING SYMPTOMS : shortness of breath, seizure, rash/stiff neck/headache, severe abdominal pain, persistent vomiting, any signs of dehydration, or if your child has a fever for over five (5) days.    Debility    Altered mental status    Advanced care planning/counseling discussion    Decreased oral intake

## 2022-08-18 NOTE — PROGRESS NOTE ADULT - SUBJECTIVE AND OBJECTIVE BOX
HPI:  89 year old female with a history of HTN and HLD presents to the ED from [facility] with altered mental status and fever. On presentation, pt minimally responsive, only gives name, unable to collect history. Spoke with son Rohan over the phone. Pt was noted to be not herself, not recognizing her daughter at the NH, also spiked a fever at NH to 103 so brought to ED. As per son, at baseline pt ~AAO*2, has had progressive functional/cognitive decline over the last few months, sometimes forgets names but knows herself and kids, usually knows where she is, likely not know year/president, walks with walker, needs help dressing/bathroom. Son lives in [place], his sister lives on  and visits mother daily. As per ED note, Daughter has not noted cough, shortness of breath, vomiting, malodourous urine. (07 Aug 2022 23:31)    INTERVAL EVENTS:  8/17: patient comfortable, states no issues, endorses not having appetite  8/18: patient drinking ensure, states having some appetite    ADVANCE DIRECTIVES:    DNR  MOLST  [ ]  Living Will  [ ]   DECISION MAKER(s):  [ ] Health Care Proxy(s)  [x ] Surrogate(s)  [ ] Guardian           Name(s): Phone Number(s):  Rohan Richardson [phone]  Alma Knight [phone]    BASELINE (I)ADL(s) (prior to admission):  [place]: [ ]Total  [x ] Moderate [ ]Dependent  Palliative Performance Status Version 2:         %    http://npcrc.org/files/news/palliative_performance_scale_ppsv2.pdf    Allergies    No Known Allergies    Intolerances    MEDICATIONS  (STANDING):  heparin   Injectable 5000 Unit(s) SubCutaneous every 12 hours  mirtazapine 7.5 milliGRAM(s) Oral daily  valsartan 160 milliGRAM(s) Oral daily    MEDICATIONS  (PRN):  acetaminophen     Tablet .. 650 milliGRAM(s) Oral every 6 hours PRN Temp greater or equal to 38.5C (101.3F)    PRESENT SYMPTOMS: [ ]Unable to obtain due to poor mentation   Source if other than patient:  [ ]Family   [ ]Team     Pain: [ ]yes [x]no  QOL impact -   Location -                    Aggravating factors -  Quality -  Radiation -  Timing-  Severity (0-10 scale):  Minimal acceptable level (0-10 scale):     CPOT:    https://www.[facility].org/getattachment/fux12t50-0n9e-4b1m-[email or web address]/Critical-Care-Pain-Observation-Tool-(CPOT)      PAIN AD Score:     http://geriatrictoolkit.[facility]/cog/painad.pdf (press ctrl +  left click to view)    Dyspnea:                           [ ]Mild [ ]Moderate [ ]Severe  Anxiety:                             [ ]Mild [ ]Moderate [ ]Severe  Fatigue:                             [ ]Mild [ ]Moderate [ ]Severe  Nausea:                             [ ]Mild [ ]Moderate [ ]Severe  Loss of appetite:              [ ]Mild [ X]Moderate [ ]Severe  Constipation:                    [ ]Mild [ ]Moderate [ ]Severe    Other Symptoms:  [x ]All other review of systems negative     Palliative Performance Status Version 2:         %    http://[facility].org/files/news/palliative_performance_scale_ppsv2.pdf    PHYSICAL EXAM:  Vital Signs Last 24 Hrs  T(C): 36.2 (18 Aug 2022 13:00), Max: 36.6 (18 Aug 2022 05:07)  T(F): 97.1 (18 Aug 2022 13:00), Max: 97.8 (18 Aug 2022 05:07)  HR: 97 (18 Aug 2022 13:00) (91 - 97)  BP: 109/72 (18 Aug 2022 13:00) (109/72 - 123/66)  BP(mean): --  RR: 20 (18 Aug 2022 13:00) (18 - 20)  SpO2: 77% (18 Aug 2022 13:00) (77% - 100%)    Parameters below as of 18 Aug 2022 13:00  Patient On (Oxygen Delivery Method): room air         I&O's Summary    GENERAL:  [ ]Alert  [ ]Oriented x   [ ]Lethargic  [ ]Cachexia  [ ]Unarousable  [ X]Verbal  [ ]Non-Verbal  Behavioral:   [ ] Anxiety  [ ] Delirium [ ] Agitation [X ] Calm [ ] Other  HEENT:  [X ]Normal   [ ]Dry mouth   [ ]ET Tube/Trach  [ ]Oral lesions  PULMONARY:   [ ]Clear [ ]Tachypnea  [ ]Audible excessive secretions [X ] No labored breathing  [ ]Rhonchi        [ ]Right [ ]Left [ ]Bilateral  [ ]Crackles        [ ]Right [ ]Left [ ]Bilateral  [ ]Wheezing     [ ]Right [ ]Left [ ]Bilateral  [ ]Diminished breath sounds [ ]right [ ]left [ ]bilateral  CARDIOVASCULAR:    [ ]Regular [ ]Irregular [ ]Tachy  [ ]Jordy [ ]Murmur [ ]Other  GASTROINTESTINAL:  [ ]Soft  [ ]Distended  [X ] Not distended [ ]+BS  [ ]Non tender [ ]Tender  [ ]PEG [ ]OGT/ NGT  Last BM:   GENITOURINARY:  [ ]Normal [ ] Incontinent   [ ]Oliguria/Anuria   [ X]Lester  MUSCULOSKELETAL:   [ ]Normal   [ ]Weakness  [ ]Bed/Wheelchair bound [ ]Edema  NEUROLOGIC:   [ ]No focal deficits  [ X]Cognitive impairment  [ ]Dysphagia [ ]Dysarthria [ ]Paresis [ ]Other   SKIN:   [ ]Normal   [X ] Nonjaundiced [ ]Rash  [ ]Pressure ulcer(s)       Present on admission [ ]y [ ]n    LABS:                                   12.0   4.74  )-----------( 259      ( 18 Aug 2022 06:47 )             35.1     08-18    134<L>  |  96<L>  |  17  ----------------------------<  99  4.1   |  29  |  0.9    Ca    8.9      18 Aug 2022 06:47  Mg     1.7     08-18    IMAGING:  no new imaging for review    REFERRALS:   [ ]Chaplaincy  [ ]Hospice  [ ]Child Life  [ x]Social Work  [x ]Case management [ ]Holistic Therapy     Goals of Care Document:

## 2022-08-19 LAB
ALBUMIN SERPL ELPH-MCNC: 3.4 G/DL — LOW (ref 3.5–5.2)
ALP SERPL-CCNC: 54 U/L — SIGNIFICANT CHANGE UP (ref 30–115)
ALT FLD-CCNC: 7 U/L — SIGNIFICANT CHANGE UP (ref 0–41)
ANION GAP SERPL CALC-SCNC: 10 MMOL/L — SIGNIFICANT CHANGE UP (ref 7–14)
AST SERPL-CCNC: 14 U/L — SIGNIFICANT CHANGE UP (ref 0–41)
BILIRUB SERPL-MCNC: 0.5 MG/DL — SIGNIFICANT CHANGE UP (ref 0.2–1.2)
BUN SERPL-MCNC: 21 MG/DL — HIGH (ref 10–20)
CALCIUM SERPL-MCNC: 9.2 MG/DL — SIGNIFICANT CHANGE UP (ref 8.5–10.1)
CHLORIDE SERPL-SCNC: 96 MMOL/L — LOW (ref 98–110)
CO2 SERPL-SCNC: 30 MMOL/L — SIGNIFICANT CHANGE UP (ref 17–32)
CREAT SERPL-MCNC: 0.9 MG/DL — SIGNIFICANT CHANGE UP (ref 0.7–1.5)
EGFR: 59 ML/MIN/1.73M2 — LOW
GLUCOSE SERPL-MCNC: 109 MG/DL — HIGH (ref 70–99)
HCT VFR BLD CALC: 34.5 % — LOW (ref 37–47)
HGB BLD-MCNC: 11.6 G/DL — LOW (ref 12–16)
MAGNESIUM SERPL-MCNC: 2.4 MG/DL — SIGNIFICANT CHANGE UP (ref 1.8–2.4)
MCHC RBC-ENTMCNC: 28.9 PG — SIGNIFICANT CHANGE UP (ref 27–31)
MCHC RBC-ENTMCNC: 33.6 G/DL — SIGNIFICANT CHANGE UP (ref 32–37)
MCV RBC AUTO: 86 FL — SIGNIFICANT CHANGE UP (ref 81–99)
NRBC # BLD: 0 /100 WBCS — SIGNIFICANT CHANGE UP (ref 0–0)
PLATELET # BLD AUTO: 308 K/UL — SIGNIFICANT CHANGE UP (ref 130–400)
POTASSIUM SERPL-MCNC: 4.5 MMOL/L — SIGNIFICANT CHANGE UP (ref 3.5–5)
POTASSIUM SERPL-SCNC: 4.5 MMOL/L — SIGNIFICANT CHANGE UP (ref 3.5–5)
PROT SERPL-MCNC: 6 G/DL — SIGNIFICANT CHANGE UP (ref 6–8)
RBC # BLD: 4.01 M/UL — LOW (ref 4.2–5.4)
RBC # FLD: 13.3 % — SIGNIFICANT CHANGE UP (ref 11.5–14.5)
SODIUM SERPL-SCNC: 136 MMOL/L — SIGNIFICANT CHANGE UP (ref 135–146)
WBC # BLD: 4.84 K/UL — SIGNIFICANT CHANGE UP (ref 4.8–10.8)
WBC # FLD AUTO: 4.84 K/UL — SIGNIFICANT CHANGE UP (ref 4.8–10.8)

## 2022-08-19 PROCEDURE — 99232 SBSQ HOSP IP/OBS MODERATE 35: CPT

## 2022-08-19 PROCEDURE — 99233 SBSQ HOSP IP/OBS HIGH 50: CPT

## 2022-08-19 RX ADMIN — VALSARTAN 160 MILLIGRAM(S): 80 TABLET ORAL at 05:25

## 2022-08-19 RX ADMIN — HEPARIN SODIUM 5000 UNIT(S): 5000 INJECTION INTRAVENOUS; SUBCUTANEOUS at 17:50

## 2022-08-19 RX ADMIN — MIRTAZAPINE 7.5 MILLIGRAM(S): 45 TABLET, ORALLY DISINTEGRATING ORAL at 11:03

## 2022-08-19 RX ADMIN — HEPARIN SODIUM 5000 UNIT(S): 5000 INJECTION INTRAVENOUS; SUBCUTANEOUS at 05:25

## 2022-08-19 NOTE — PROGRESS NOTE ADULT - PROBLEM SELECTOR PLAN 2
- needs assistance with ADLs  - goals are for [facility] SNF
- plan for rehab
- plan was for rehab eventually, but will discuss with family re: ANURADHA
- plan was for rehab eventually, but will discuss with family re: ANURADHA

## 2022-08-19 NOTE — PROGRESS NOTE ADULT - PROBLEM SELECTOR PROBLEM 1
Decreased oral intake
Decreased oral intake
Urinary tract infection with fever
Decreased oral intake

## 2022-08-19 NOTE — PROGRESS NOTE ADULT - ASSESSMENT
89 yo F with hx of HTN, HLD, s/p recent R hip fx d/t fall s/p ORIF initially admitted with acute metabolic encephalopathy with fever to 103 and found to have abn UA and low sodium level and elevated creatinine.  Mentation and electrolytes improved and plan was to transfer back to [facility] when covid test returned and it was positive. noted to have adult failure to thrive/ with marked severe protein calorie malnutrition 2/2 poor caloric intake    - Metabolic encephalopathy; resolving  Back at baseline mentation  - E.coli UTI. - completed tx   - Covid +.  Not hypoxic.  s/p  IV Remdesivir x 3 days; maintain covid precautions    encourage po intake, s/p gentle ivh  hyponatremic, likely 2/2 hypovolemia and lack of appropriate solute intake and home overdiuresis, s/p gentle ivh +, hold home diuretic  guarded prognosis      DVT prophylaxis, nutrition to f/up and fall precautions.    Pending :    DC planning after GOC discussion with family re: nutrition / ? PEG  Dispo :  LTC

## 2022-08-19 NOTE — PROGRESS NOTE ADULT - SUBJECTIVE AND OBJECTIVE BOX
LOREE CELAYA  89y  Female      Patient is a 89y old  Female who presents with a chief complaint of Fever.      INTERVAL HPI/OVERNIGHT EVENTS:      ******************************* REVIEW OF SYSTEMS:**********************************************    All other review of systems negative    *********************** VITALS ******************************************    T(F): 98.6 (08-19-22 @ 13:32)  HR: 89 (08-19-22 @ 13:32) (89 - 101)  BP: 124/58 (08-19-22 @ 13:32) (124/58 - 150/69)  RR: 18 (08-19-22 @ 13:32) (15 - 18)  SpO2: 96% (08-19-22 @ 13:32) (96% - 97%)            ******************************** PHYSICAL EXAM:**************************************************  GENERAL: NAD    PSYCH: no agitation, baseline mentation  HEENT:     NERVOUS SYSTEM:  Alert & Oriented X1-2     PULMONARY: RAYMUNDO, CTA    CARDIOVASCULAR: S1S2 RRR    GI: Soft, NT, ND; BS present.    EXTREMITIES:  2+ Peripheral Pulses, No clubbing, cyanosis, or edema    LYMPH: No lymphadenopathy noted    SKIN: No rashes or lesions      **************************** LABS *******************************************************                          11.6   4.84  )-----------( 308      ( 19 Aug 2022 06:37 )             34.5     08-19    136  |  96<L>  |  21<H>  ----------------------------<  109<H>  4.5   |  30  |  0.9    Ca    9.2      19 Aug 2022 06:37  Mg     2.4     08-19    TPro  6.0  /  Alb  3.4<L>  /  TBili  0.5  /  DBili  x   /  AST  14  /  ALT  7   /  AlkPhos  54  08-19          Lactate Trend        CAPILLARY BLOOD GLUCOSE              **************************Active Medications *******************************************  No Known Allergies      acetaminophen     Tablet .. 650 milliGRAM(s) Oral every 6 hours PRN  heparin   Injectable 5000 Unit(s) SubCutaneous every 12 hours  mirtazapine 7.5 milliGRAM(s) Oral daily  valsartan 160 milliGRAM(s) Oral daily      ***************************************************  RADIOLOGY & ADDITIONAL TESTS:    Imaging Personally Reviewed:  [ ] YES  [ ] NO    HEALTH ISSUES - PROBLEM Dx:  Palliative care by specialist  .    Debility    Altered mental status    Advanced care planning/counseling discussion    Decreased oral intake

## 2022-08-19 NOTE — PROGRESS NOTE ADULT - PROBLEM SELECTOR PLAN 3
- appears to be improving  - monitor

## 2022-08-19 NOTE — PROGRESS NOTE ADULT - PROBLEM SELECTOR PLAN 1
- c/w cefpodoxime
- changed marinol to mirtazapine 8/18/22, agree with dietary that marinol may contribute to confusion  - 3-day calorie count reviewed, meeting 59% of nutritional needs per dietary, c/w remeron, ensure TID
- changed marinol to mirtazapine, agree with dietary that marinol contribute to confusion  - eating at most 50% of meals per RN documentation, f/u 3-day calorie count, dietary recs  - will d/w family re: nutritional GOC
- changed marinol to mirtazapine 8/18/22, agree with dietary that marinol may contribute to confusion  - f/u 3-day calorie count, dietary recs  - had 0-25% of breakfast today per documentation  - will d/w family re: nutritional GOC

## 2022-08-19 NOTE — PROGRESS NOTE ADULT - SUBJECTIVE AND OBJECTIVE BOX
HPI:  89 year old female with a history of HTN and HLD presents to the ED from [facility] with altered mental status and fever. On presentation, pt minimally responsive, only gives name, unable to collect history. Spoke with son Rohan over the phone. Pt was noted to be not herself, not recognizing her daughter at the NH, also spiked a fever at NH to 103 so brought to ED. As per son, at baseline pt ~AAO*2, has had progressive functional/cognitive decline over the last few months, sometimes forgets names but knows herself and kids, usually knows where she is, likely not know year/president, walks with walker, needs help dressing/bathroom. Son lives in [place], his sister lives on  and visits mother daily. As per ED note, Daughter has not noted cough, shortness of breath, vomiting, malodourous urine. (07 Aug 2022 23:31)    INTERVAL EVENTS:  8/17: patient comfortable, states no issues, endorses not having appetite  8/18: patient drinking ensure, states having some appetite  8/19: patient sleeping, appears comfortable    ADVANCE DIRECTIVES:    DNR  MOLST  [ ]  Living Will  [ ]   DECISION MAKER(s):  [ ] Health Care Proxy(s)  [x ] Surrogate(s)  [ ] Guardian           Name(s): Phone Number(s):  Rohan Richardson [phone]  Alma Knight [phone]    BASELINE (I)ADL(s) (prior to admission):  [place]: [ ]Total  [x ] Moderate [ ]Dependent  Palliative Performance Status Version 2:         %    http://npcrc.org/files/news/palliative_performance_scale_ppsv2.pdf    Allergies    No Known Allergies    Intolerances    MEDICATIONS  (STANDING):  heparin   Injectable 5000 Unit(s) SubCutaneous every 12 hours  mirtazapine 7.5 milliGRAM(s) Oral daily  valsartan 160 milliGRAM(s) Oral daily    MEDICATIONS  (PRN):  acetaminophen     Tablet .. 650 milliGRAM(s) Oral every 6 hours PRN Temp greater or equal to 38.5C (101.3F)    PRESENT SYMPTOMS: [X ]Unable to obtain due to patient sleeping  Source if other than patient:  [ ]Family   [ ]Team     Pain: [ ]yes [x]no  QOL impact -   Location -                    Aggravating factors -  Quality -  Radiation -  Timing-  Severity (0-10 scale):  Minimal acceptable level (0-10 scale):     CPOT:    https://www.[facility].org/getattachment/fin33o84-8u9m-[email or web address]-1623u0086b8q/Critical-Care-Pain-Observation-Tool-(CPOT)      PAIN AD Score: 0    http://geriatrictoolkit.[facility]/cog/painad.pdf (press ctrl +  left click to view)    Dyspnea:                           [ ]Mild [ ]Moderate [ ]Severe  Anxiety:                             [ ]Mild [ ]Moderate [ ]Severe  Fatigue:                             [ ]Mild [ ]Moderate [ ]Severe  Nausea:                             [ ]Mild [ ]Moderate [ ]Severe  Loss of appetite:              [ ]Mild [ X]Moderate [ ]Severe  Constipation:                    [ ]Mild [ ]Moderate [ ]Severe    Other Symptoms:  [x ]All other review of systems negative     Palliative Performance Status Version 2:         %    http://[facility].org/files/news/palliative_performance_scale_ppsv2.pdf    PHYSICAL EXAM:  Vital Signs Last 24 Hrs  T(C): 37 (19 Aug 2022 13:32), Max: 37 (19 Aug 2022 13:32)  T(F): 98.6 (19 Aug 2022 13:32), Max: 98.6 (19 Aug 2022 13:32)  HR: 89 (19 Aug 2022 13:32) (89 - 101)  BP: 124/58 (19 Aug 2022 13:32) (124/58 - 150/69)  BP(mean): --  RR: 18 (19 Aug 2022 13:32) (15 - 18)  SpO2: 96% (19 Aug 2022 13:32) (96% - 97%)     I&O's Summary    GENERAL:  [ ]Alert  [ ]Oriented x   [ ]Lethargic  [ ]Cachexia  [ ]Unarousable [X] Sleeipng [ ]Verbal  [ ]Non-Verbal  Behavioral:   [ ] Anxiety  [ ] Delirium [ ] Agitation [X ] Calm [ ] Other  HEENT:  [X ]Normal   [ ]Dry mouth   [ ]ET Tube/Trach  [ ]Oral lesions  PULMONARY:   [ ]Clear [ ]Tachypnea  [ ]Audible excessive secretions [X ] No labored breathing  [ ]Rhonchi        [ ]Right [ ]Left [ ]Bilateral  [ ]Crackles        [ ]Right [ ]Left [ ]Bilateral  [ ]Wheezing     [ ]Right [ ]Left [ ]Bilateral  [ ]Diminished breath sounds [ ]right [ ]left [ ]bilateral  CARDIOVASCULAR:    [ ]Regular [ ]Irregular [ ]Tachy  [ ]Jordy [ ]Murmur [ ]Other  GASTROINTESTINAL:  [ ]Soft  [ ]Distended  [X ] Not distended [ ]+BS  [ ]Non tender [ ]Tender  [ ]PEG [ ]OGT/ NGT  Last BM:   GENITOURINARY:  [ ]Normal [ ] Incontinent   [ ]Oliguria/Anuria   [ X]Lester  MUSCULOSKELETAL:   [ ]Normal   [ ]Weakness  [ ]Bed/Wheelchair bound [ ]Edema  NEUROLOGIC:   [ ]No focal deficits  [ X]Cognitive impairment  [ ]Dysphagia [ ]Dysarthria [ ]Paresis [ ]Other   SKIN:   [ ]Normal   [X ] Nonjaundiced [ ]Rash  [ ]Pressure ulcer(s)       Present on admission [ ]y [ ]n    LABS:                                   11.6   4.84  )-----------( 308      ( 19 Aug 2022 06:37 )             34.5     08-19    136  |  96<L>  |  21<H>  ----------------------------<  109<H>  4.5   |  30  |  0.9    Ca    9.2      19 Aug 2022 06:37  Mg     2.4     08-19        IMAGING:  no new imaging for review    REFERRALS:   [ ]Chaplaincy  [ ]Hospice  [ ]Child Life  [ x]Social Work  [x ]Case management [ ]Holistic Therapy     Goals of Care Document:

## 2022-08-19 NOTE — PROGRESS NOTE ADULT - PROBLEM SELECTOR PLAN 4
- will follow  ______________  Jadiel Duque MD  Palliative Medicine  [facility]   of Geriatric and Palliative Medicine  [phone]
- will follow  ______________  Jadiel Duque MD  Palliative Medicine  [facility]   of Geriatric and Palliative Medicine  [phone]
- signing off, reconsult PRN  ______________  Jadiel Duque MD  Palliative Medicine  [facility]   of Geriatric and Palliative Medicine  [phone]
- will sign off for now, reconsult PRN  ______________  Jadiel Duque MD  Palliative Medicine  [facility]   of Geriatric and Palliative Medicine  [phone]

## 2022-08-19 NOTE — PROGRESS NOTE ADULT - ASSESSMENT
89 yo F with hx of HTN, HLD, s/p recent R hip fx d/t fall s/p ORIF initially admitted with acute metabolic encephalopathy with fever to 103 and found to have abn UA and low sodium level and elevated creatinine.  Mentation and electrolytes improved and plan was to transfer back to [facility] when covid test returned and it was positive. noted to have adult failure to thrive/ with marked severe protein calorie malnutrition 2/2 poor caloric intake    - Metabolic encephalopathy; resolving  Back at baseline mentation  - E.coli UTI. - completed tx c ID guidance   - Covid +.  Not hypoxic.  + Leukopenia. s/p  IV Remdesivir x 3 days; maintain covid precautions.  continue to trend o2 requirements  dehydration, encourage po intake, s/p gentle ivh  hyponatremic, likely 2/2 hypovolemia and lack of appropriate solute intake and home overdiuresis, s/p gentle ivh +, hold home diuretic  guarded prognosis      DVT prophylaxis, nutrition to f/up and fall precautions.

## 2022-08-20 LAB
ALBUMIN SERPL ELPH-MCNC: 3.3 G/DL — LOW (ref 3.5–5.2)
ALP SERPL-CCNC: 48 U/L — SIGNIFICANT CHANGE UP (ref 30–115)
ALT FLD-CCNC: 6 U/L — SIGNIFICANT CHANGE UP (ref 0–41)
ANION GAP SERPL CALC-SCNC: 11 MMOL/L — SIGNIFICANT CHANGE UP (ref 7–14)
AST SERPL-CCNC: 14 U/L — SIGNIFICANT CHANGE UP (ref 0–41)
BILIRUB SERPL-MCNC: 0.5 MG/DL — SIGNIFICANT CHANGE UP (ref 0.2–1.2)
BUN SERPL-MCNC: 23 MG/DL — HIGH (ref 10–20)
CALCIUM SERPL-MCNC: 9.2 MG/DL — SIGNIFICANT CHANGE UP (ref 8.5–10.1)
CHLORIDE SERPL-SCNC: 99 MMOL/L — SIGNIFICANT CHANGE UP (ref 98–110)
CO2 SERPL-SCNC: 29 MMOL/L — SIGNIFICANT CHANGE UP (ref 17–32)
CREAT SERPL-MCNC: 0.9 MG/DL — SIGNIFICANT CHANGE UP (ref 0.7–1.5)
EGFR: 59 ML/MIN/1.73M2 — LOW
GLUCOSE SERPL-MCNC: 94 MG/DL — SIGNIFICANT CHANGE UP (ref 70–99)
HCT VFR BLD CALC: 32.7 % — LOW (ref 37–47)
HGB BLD-MCNC: 10.7 G/DL — LOW (ref 12–16)
MAGNESIUM SERPL-MCNC: 2 MG/DL — SIGNIFICANT CHANGE UP (ref 1.8–2.4)
MCHC RBC-ENTMCNC: 28.3 PG — SIGNIFICANT CHANGE UP (ref 27–31)
MCHC RBC-ENTMCNC: 32.7 G/DL — SIGNIFICANT CHANGE UP (ref 32–37)
MCV RBC AUTO: 86.5 FL — SIGNIFICANT CHANGE UP (ref 81–99)
NRBC # BLD: 0 /100 WBCS — SIGNIFICANT CHANGE UP (ref 0–0)
PLATELET # BLD AUTO: 277 K/UL — SIGNIFICANT CHANGE UP (ref 130–400)
POTASSIUM SERPL-MCNC: 4.6 MMOL/L — SIGNIFICANT CHANGE UP (ref 3.5–5)
POTASSIUM SERPL-SCNC: 4.6 MMOL/L — SIGNIFICANT CHANGE UP (ref 3.5–5)
PROT SERPL-MCNC: 5.8 G/DL — LOW (ref 6–8)
RBC # BLD: 3.78 M/UL — LOW (ref 4.2–5.4)
RBC # FLD: 13.5 % — SIGNIFICANT CHANGE UP (ref 11.5–14.5)
SODIUM SERPL-SCNC: 139 MMOL/L — SIGNIFICANT CHANGE UP (ref 135–146)
WBC # BLD: 5.36 K/UL — SIGNIFICANT CHANGE UP (ref 4.8–10.8)
WBC # FLD AUTO: 5.36 K/UL — SIGNIFICANT CHANGE UP (ref 4.8–10.8)

## 2022-08-20 PROCEDURE — 99232 SBSQ HOSP IP/OBS MODERATE 35: CPT

## 2022-08-20 RX ORDER — VALSARTAN 80 MG/1
1 TABLET ORAL
Qty: 0 | Refills: 0 | DISCHARGE
Start: 2022-08-20

## 2022-08-20 RX ORDER — METOPROLOL TARTRATE 50 MG
1 TABLET ORAL
Qty: 0 | Refills: 0 | DISCHARGE

## 2022-08-20 RX ORDER — MIRTAZAPINE 45 MG/1
1 TABLET, ORALLY DISINTEGRATING ORAL
Qty: 0 | Refills: 0 | DISCHARGE
Start: 2022-08-20

## 2022-08-20 RX ADMIN — VALSARTAN 160 MILLIGRAM(S): 80 TABLET ORAL at 05:04

## 2022-08-20 RX ADMIN — HEPARIN SODIUM 5000 UNIT(S): 5000 INJECTION INTRAVENOUS; SUBCUTANEOUS at 05:04

## 2022-08-20 RX ADMIN — HEPARIN SODIUM 5000 UNIT(S): 5000 INJECTION INTRAVENOUS; SUBCUTANEOUS at 18:45

## 2022-08-20 RX ADMIN — MIRTAZAPINE 7.5 MILLIGRAM(S): 45 TABLET, ORALLY DISINTEGRATING ORAL at 13:16

## 2022-08-20 NOTE — PROGRESS NOTE ADULT - PROVIDER SPECIALTY LIST ADULT
Hospitalist
Hospitalist
Internal Medicine
Hospitalist
Internal Medicine
Hospitalist
Internal Medicine
Hospitalist
Hospitalist
Internal Medicine
Palliative Care
Infectious Disease
Palliative Care

## 2022-08-20 NOTE — PROGRESS NOTE ADULT - SUBJECTIVE AND OBJECTIVE BOX
LOREE CELAYA  89y  Female      Patient is a 89y old  Female who presents with a chief complaint of Fever.      INTERVAL HPI/OVERNIGHT EVENTS:      ******************************* REVIEW OF SYSTEMS:*********************************************    All other review of systems negative    *********************** VITALS ******************************************    T(F): 97.1 (08-20-22 @ 05:00)  HR: 116 (08-20-22 @ 05:00) (79 - 116)  BP: 105/53 (08-20-22 @ 05:00) (105/53 - 134/73)  RR: 18 (08-20-22 @ 05:00) (18 - 18)  SpO2: 98% (08-20-22 @ 05:00) (96% - 99%)    08-19-22 @ 07:01  -  08-20-22 @ 07:00  --------------------------------------------------------  IN: 0 mL / OUT: 300 mL / NET: -300 mL            08-19-22 @ 07:01  -  08-20-22 @ 07:00  --------------------------------------------------------  IN: 0 mL / OUT: 300 mL / NET: -300 mL        ******************************** PHYSICAL EXAM:**************************************************  GENERAL: NAD    PSYCH: no agitation, baseline mentation  HEENT: hard of hearing     NERVOUS SYSTEM:  Alert & Oriented X2-3  PULMONARY: RAYMUNDO, CTA    CARDIOVASCULAR: S1S2 RRR    GI: Soft, NT, ND; BS present.    EXTREMITIES:  2+ Peripheral Pulses, No clubbing, cyanosis, or edema    LYMPH: No lymphadenopathy noted    SKIN: No rashes or lesions      **************************** LABS *******************************************************                          10.7   5.36  )-----------( 277      ( 20 Aug 2022 09:06 )             32.7     08-20    139  |  99  |  23<H>  ----------------------------<  94  4.6   |  29  |  0.9    Ca    9.2      20 Aug 2022 09:06  Mg     2.0     08-20    TPro  5.8<L>  /  Alb  3.3<L>  /  TBili  0.5  /  DBili  x   /  AST  14  /  ALT  6   /  AlkPhos  48  08-20          Lactate Trend        CAPILLARY BLOOD GLUCOSE              **************************Active Medications *******************************************  No Known Allergies      acetaminophen     Tablet .. 650 milliGRAM(s) Oral every 6 hours PRN  heparin   Injectable 5000 Unit(s) SubCutaneous every 12 hours  mirtazapine 7.5 milliGRAM(s) Oral daily  valsartan 160 milliGRAM(s) Oral daily      ***************************************************  RADIOLOGY & ADDITIONAL TESTS:    Imaging Personally Reviewed:  [ ] YES  [ ] NO    HEALTH ISSUES - PROBLEM Dx:  Palliative care by specialist    Urinary tract infection with fever  Fever    A fever is an increase in the body&#x27;s temperature above 100.4°F (38°C) or higher. In adults and children older than three months, a brief mild or moderate fever generally has no long-term effect, and it usually does not require treatment. Many times, fevers are the result of viral infections, which are self-resolving.  However, certain symptoms or diagnostic tests may suggest a bacterial infection that may respond to antibiotics. Take medications as directed by your health care provider.    SEEK IMMEDIATE MEDICAL CARE IF YOU OR YOUR CHILD HAVE ANY OF THE FOLLOWING SYMPTOMS : shortness of breath, seizure, rash/stiff neck/headache, severe abdominal pain, persistent vomiting, any signs of dehydration, or if your child has a fever for over five (5) days.    Debility    Altered mental status    Advanced care planning/counseling discussion    Decreased oral intake

## 2022-08-20 NOTE — PROGRESS NOTE ADULT - ASSESSMENT
89 yo F with hx of HTN, HLD, s/p recent R hip fx d/t fall s/p ORIF initially admitted with acute metabolic encephalopathy with fever to 103 and found to have abn UA and low sodium level and elevated creatinine.  Mentation and electrolytes improved and plan was to transfer back to [facility] when covid test returned and it was positive. noted to have adult failure to thrive/ with marked severe protein calorie malnutrition 2/2 poor caloric intake    - Metabolic encephalopathy; resolving  Back at baseline mentation  - E.coli UTI. - completed tx   - Covid +.  Not hypoxic.  s/p  IV Remdesivir x 3 days; maintain covid precautions    encourage po intake, s/p gentle ivh    guarded prognosis      DVT prophylaxis,     Pending :    DC planning d/w the son and the daughter in law in details. Agrees with dc planning to LTC.   Dispo :  LTC

## 2022-08-20 NOTE — PROGRESS NOTE ADULT - NUTRITIONAL ASSESSMENT
This patient has been assessed with a concern for Malnutrition and has been determined to have a diagnosis/diagnoses of Moderate protein-calorie malnutrition.    This patient is being managed with:   Diet Easy to Chew-  Supplement Feeding Modality:  Oral  Ensure Enlive Cans or Servings Per Day:  3       Frequency:  Daily  Entered: Aug 17 2022  3:15PM    

## 2022-08-21 VITALS
DIASTOLIC BLOOD PRESSURE: 63 MMHG | HEART RATE: 90 BPM | TEMPERATURE: 97 F | SYSTOLIC BLOOD PRESSURE: 110 MMHG | RESPIRATION RATE: 18 BRPM

## 2022-08-21 LAB
ALBUMIN SERPL ELPH-MCNC: 3.3 G/DL — LOW (ref 3.5–5.2)
ALP SERPL-CCNC: 46 U/L — SIGNIFICANT CHANGE UP (ref 30–115)
ALT FLD-CCNC: 6 U/L — SIGNIFICANT CHANGE UP (ref 0–41)
ANION GAP SERPL CALC-SCNC: 8 MMOL/L — SIGNIFICANT CHANGE UP (ref 7–14)
AST SERPL-CCNC: 14 U/L — SIGNIFICANT CHANGE UP (ref 0–41)
BILIRUB SERPL-MCNC: 0.5 MG/DL — SIGNIFICANT CHANGE UP (ref 0.2–1.2)
BUN SERPL-MCNC: 28 MG/DL — HIGH (ref 10–20)
CALCIUM SERPL-MCNC: 9.3 MG/DL — SIGNIFICANT CHANGE UP (ref 8.5–10.1)
CHLORIDE SERPL-SCNC: 98 MMOL/L — SIGNIFICANT CHANGE UP (ref 98–110)
CO2 SERPL-SCNC: 29 MMOL/L — SIGNIFICANT CHANGE UP (ref 17–32)
CREAT SERPL-MCNC: 1 MG/DL — SIGNIFICANT CHANGE UP (ref 0.7–1.5)
EGFR: 52 ML/MIN/1.73M2 — LOW
GLUCOSE SERPL-MCNC: 92 MG/DL — SIGNIFICANT CHANGE UP (ref 70–99)
HCT VFR BLD CALC: 32.2 % — LOW (ref 37–47)
HGB BLD-MCNC: 10.6 G/DL — LOW (ref 12–16)
MAGNESIUM SERPL-MCNC: 1.9 MG/DL — SIGNIFICANT CHANGE UP (ref 1.8–2.4)
MCHC RBC-ENTMCNC: 28.5 PG — SIGNIFICANT CHANGE UP (ref 27–31)
MCHC RBC-ENTMCNC: 32.9 G/DL — SIGNIFICANT CHANGE UP (ref 32–37)
MCV RBC AUTO: 86.6 FL — SIGNIFICANT CHANGE UP (ref 81–99)
NRBC # BLD: 0 /100 WBCS — SIGNIFICANT CHANGE UP (ref 0–0)
PLATELET # BLD AUTO: 277 K/UL — SIGNIFICANT CHANGE UP (ref 130–400)
POTASSIUM SERPL-MCNC: 4.2 MMOL/L — SIGNIFICANT CHANGE UP (ref 3.5–5)
POTASSIUM SERPL-SCNC: 4.2 MMOL/L — SIGNIFICANT CHANGE UP (ref 3.5–5)
PROT SERPL-MCNC: 5.9 G/DL — LOW (ref 6–8)
RBC # BLD: 3.72 M/UL — LOW (ref 4.2–5.4)
RBC # FLD: 13.6 % — SIGNIFICANT CHANGE UP (ref 11.5–14.5)
SODIUM SERPL-SCNC: 135 MMOL/L — SIGNIFICANT CHANGE UP (ref 135–146)
WBC # BLD: 5.63 K/UL — SIGNIFICANT CHANGE UP (ref 4.8–10.8)
WBC # FLD AUTO: 5.63 K/UL — SIGNIFICANT CHANGE UP (ref 4.8–10.8)

## 2022-08-21 PROCEDURE — 99232 SBSQ HOSP IP/OBS MODERATE 35: CPT

## 2022-08-21 RX ADMIN — MIRTAZAPINE 7.5 MILLIGRAM(S): 45 TABLET, ORALLY DISINTEGRATING ORAL at 12:09

## 2022-08-21 RX ADMIN — HEPARIN SODIUM 5000 UNIT(S): 5000 INJECTION INTRAVENOUS; SUBCUTANEOUS at 17:25

## 2022-08-21 RX ADMIN — VALSARTAN 160 MILLIGRAM(S): 80 TABLET ORAL at 06:15

## 2022-08-21 RX ADMIN — HEPARIN SODIUM 5000 UNIT(S): 5000 INJECTION INTRAVENOUS; SUBCUTANEOUS at 06:15

## 2022-08-21 NOTE — DISCHARGE NOTE NURSING/CASE MANAGEMENT/SOCIAL WORK - NSDCPEFALRISK_GEN_ALL_CORE
For information on Fall & Injury Prevention, visit: [email or web address] OR  [email or web address] OR  https://www.cdc.gov/steadi/patient.html

## 2022-08-21 NOTE — DISCHARGE NOTE NURSING/CASE MANAGEMENT/SOCIAL WORK - PATIENT PORTAL LINK FT
You can access the FollowMyHealth Patient Portal offered by [facility] by registering at the following website: [email or web address]. By joining [employer]’s FollowMyHealth portal, you will also be able to view your health information using other applications (apps) compatible with our system.

## 2022-08-24 DIAGNOSIS — N39.0 URINARY TRACT INFECTION, SITE NOT SPECIFIED: ICD-10-CM

## 2022-08-24 DIAGNOSIS — G93.41 METABOLIC ENCEPHALOPATHY: ICD-10-CM

## 2022-08-24 DIAGNOSIS — R62.7 ADULT FAILURE TO THRIVE: ICD-10-CM

## 2022-08-24 DIAGNOSIS — E87.6 HYPOKALEMIA: ICD-10-CM

## 2022-08-24 DIAGNOSIS — R65.20 SEVERE SEPSIS WITHOUT SEPTIC SHOCK: ICD-10-CM

## 2022-08-24 DIAGNOSIS — E87.1 HYPO-OSMOLALITY AND HYPONATREMIA: ICD-10-CM

## 2022-08-24 DIAGNOSIS — E78.5 HYPERLIPIDEMIA, UNSPECIFIED: ICD-10-CM

## 2022-08-24 DIAGNOSIS — R53.81 OTHER MALAISE: ICD-10-CM

## 2022-08-24 DIAGNOSIS — A41.9 SEPSIS, UNSPECIFIED ORGANISM: ICD-10-CM

## 2022-08-24 DIAGNOSIS — U07.1 COVID-19: ICD-10-CM

## 2022-08-24 DIAGNOSIS — E86.1 HYPOVOLEMIA: ICD-10-CM

## 2022-08-24 DIAGNOSIS — Z66 DO NOT RESUSCITATE: ICD-10-CM

## 2022-08-24 DIAGNOSIS — I10 ESSENTIAL (PRIMARY) HYPERTENSION: ICD-10-CM

## 2022-08-24 DIAGNOSIS — E86.0 DEHYDRATION: ICD-10-CM

## 2022-08-24 DIAGNOSIS — E43 UNSPECIFIED SEVERE PROTEIN-CALORIE MALNUTRITION: ICD-10-CM

## 2022-08-24 DIAGNOSIS — R63.8 OTHER SYMPTOMS AND SIGNS CONCERNING FOOD AND FLUID INTAKE: ICD-10-CM

## 2022-08-24 DIAGNOSIS — Z51.5 ENCOUNTER FOR PALLIATIVE CARE: ICD-10-CM

## 2022-08-24 DIAGNOSIS — B96.20 UNSPECIFIED ESCHERICHIA COLI [E. COLI] AS THE CAUSE OF DISEASES CLASSIFIED ELSEWHERE: ICD-10-CM

## 2023-04-30 NOTE — DIETITIAN INITIAL EVALUATION ADULT - OTHER CALCULATIONS
Well controlled with pepcid 40 mg daily   using ABW 48kg  ENERGY: 970-1051kcal/day (MSJ x1.2-1.3)  PROTEIN: 58-62g/day (1.2-1.3g/kg)  FLUIDS: 1200mL/day (25mL/kg)

## 2023-06-26 NOTE — DISCHARGE NOTE PROVIDER - EXTENDED VTE YES NO FOR MLM ENOXAPARIN
, Plan: Consider biopsy if not improved Otc Regimen: Benzoyl Peroxide body wash, Aleve as needed for pain Detail Level: Simple

## 2023-10-10 NOTE — H&P ADULT - HISTORY OF PRESENT ILLNESS
Received transfer call from patient. Patient states he has been having HA for past 3 days in front and sides by shunt. Tynenol/Advil help. Constant pain level, non progressing, Nausea, no vomiting, no fever. Pressure in nose and sinus area. Transferred to [facility] for further triage.   
89yF w/ PMHx of HTN, HLD s/p Right hip replacement BIBEMS s/p unwitnessed fall at [facility], +ht, -loc, +lovenox (prophylactic, post surgical).  The patient states she fell at the nursing home on her right side, does not remember the events leading up to the fall or how she fell, but fell on the right sided that she was recently operated on by orthopedics. The patient is AOX3 at the time of my exam, GCS 15, and hemodynamically stable.     The patient complains of right hip pain and right elbow pain. External signs of trauma include right elbow skin tear and tenderness over the right hip incision site - no noted deformities.    Trauma workup was negative. Cleared by trauma and neurosurgery.  Trop 0.09.   Cr 1.6  Admit under medicine for further workup and symptomatic management

## 2025-03-03 NOTE — H&P ADULT - FAMILY/CHILD(REN)
Normal vision: sees adequately in most situations; can see medication labels, newsprint
Son (Maxwell Richardson; HCP) over the phone